# Patient Record
Sex: MALE | Race: BLACK OR AFRICAN AMERICAN | Employment: FULL TIME | URBAN - METROPOLITAN AREA
[De-identification: names, ages, dates, MRNs, and addresses within clinical notes are randomized per-mention and may not be internally consistent; named-entity substitution may affect disease eponyms.]

---

## 2017-03-13 ENCOUNTER — OFFICE VISIT (OUTPATIENT)
Dept: INTERNAL MEDICINE CLINIC | Age: 62
End: 2017-03-13

## 2017-03-13 VITALS
SYSTOLIC BLOOD PRESSURE: 146 MMHG | OXYGEN SATURATION: 99 % | DIASTOLIC BLOOD PRESSURE: 79 MMHG | WEIGHT: 261 LBS | BODY MASS INDEX: 41.95 KG/M2 | HEIGHT: 66 IN | TEMPERATURE: 97.7 F | HEART RATE: 80 BPM

## 2017-03-13 DIAGNOSIS — E87.6 HYPOKALEMIA: ICD-10-CM

## 2017-03-13 DIAGNOSIS — E55.9 VITAMIN D DEFICIENCY: ICD-10-CM

## 2017-03-13 DIAGNOSIS — E78.01 FAMILIAL HYPERCHOLESTEROLEMIA: ICD-10-CM

## 2017-03-13 DIAGNOSIS — I10 ESSENTIAL HYPERTENSION: ICD-10-CM

## 2017-03-13 DIAGNOSIS — I48.0 PAROXYSMAL ATRIAL FIBRILLATION (HCC): ICD-10-CM

## 2017-03-13 DIAGNOSIS — C61 PROSTATE CANCER (HCC): ICD-10-CM

## 2017-03-13 DIAGNOSIS — J45.22 MILD INTERMITTENT ASTHMA WITH STATUS ASTHMATICUS: ICD-10-CM

## 2017-03-13 DIAGNOSIS — Z11.59 NEED FOR HEPATITIS C SCREENING TEST: ICD-10-CM

## 2017-03-13 DIAGNOSIS — R10.31 RLQ ABDOMINAL PAIN: ICD-10-CM

## 2017-03-13 DIAGNOSIS — G47.33 SLEEP APNEA, OBSTRUCTIVE: ICD-10-CM

## 2017-03-13 DIAGNOSIS — E66.01 MORBID OBESITY DUE TO EXCESS CALORIES (HCC): ICD-10-CM

## 2017-03-13 DIAGNOSIS — Z23 ENCOUNTER FOR IMMUNIZATION: ICD-10-CM

## 2017-03-13 DIAGNOSIS — M79.10 MYALGIA: ICD-10-CM

## 2017-03-13 RX ORDER — METFORMIN HYDROCHLORIDE 500 MG/1
500 TABLET, EXTENDED RELEASE ORAL
Qty: 30 TAB | Refills: 4 | Status: SHIPPED | OUTPATIENT
Start: 2017-03-13 | End: 2017-06-12

## 2017-03-13 NOTE — PATIENT INSTRUCTIONS
Trial probiotic over the counter    DECREASE GLYBURIDE TO HALF TABLET EACH MORNING    START METFORMIN 1 TABLET PER DAY (WAIT FOR 3 DAYS AFTER CT SCAN)    AFTER 1 WEEK INCREASE METFORMIN TO 2 TABLETS AND STOP GLYBURIDE    SEE DR BRII LANDRY, AND DR Colon Mass AND EYE EXAM    LABS TODAY

## 2017-03-13 NOTE — PROGRESS NOTES
HISTORY OF PRESENT ILLNESS  Claire Brown is a 64 y.o. male. HPI   Pt is here to establish care.   Pt previously followed at the [facility]     BP today is mildly elevated today at 146/79  BP at home running around 135-152/70s  Continues aldactone 25mg and norvasc 10mg daily   Pt took his medications within the hour however, will repeat BP    Pt with afib and takes multaq  He also takes ASA for anticoagulation  Denies ever taking coumadin in the past   Discussed this is cardio- specific medication and I cannot prescribe this   Denies being told he has heart failure in the past     Pt previously followed with Dr. Abbe Earl (cardio)-he retired  He is not yet seeing another cardiologist but thinks he will be seeing Dr. Ventura Cerda   It has been about 1-2 years since he saw Dr. Eneida Davis him to schedule appointment with cardio  He notes of flipped T wave abnormality     Pt is diabetic and checks his BS reguarly  BS at home running around 111, 112 in the AM fasting and around 120s, 130s after eating  Pt has some low BS at home, around 55, he states this happens about once daily   He gets a low sugar usually when he is going to work during the day   He takes glyburide 5mg daily for this and reports compliance  Pt previously took this twice daily but d/t improvement takes BID  Pt denies ever taking any other medication for this, has never tried metformin   Discussed diabetic medications and would like to consider changing glyburide down the road and coming off of this, and would like to start metformin   Will have him decrease his glyburide to half tablet daily and start metformin 500mg daily   Discussed with him that BS in the 50s is much too low     Reviewed last labs 9/16  Of note, vit D has been extremely low in the past   Will repeat labs today     Continues zocor 10mg daily for cholesterol     Pt follows with Dr. Je Schuster (uro) for h/o prostate cancer  Pt completed radiation for treatment of his prostate cancer   He was dx'd around  and did treatment that year for this   He has not seen urology anytime recently   Last visit with him pt reports having normal PSA   I can check his PSA on labs     Compliant with cpap nightly   He follows Dr. Meche Crews (sleep) for this     Pt c/o RLQ pain x a few days  He has some tenderness to this   Pain is exacerbated by moving around and laying down   He states pain is worse when trying to get comfortable in certain positions  Denies association with eating or BM  Denies nausea, vomiting, constipation, or diarrhea  He does take daily stool softeners but has been stable, regular BM  Pt has not taken anything OTC for this yet   Discussed probiotic to improve this as well   Ordering imaging to evaluate this further and will r/o appendicitis     Wt is 261 lbs- morbidly obese range  Discussed diet and weight loss     Continues klor-con 20meq daily     Pt follows with Dr. Ana Moreno (ortho) for chronic knee pain   He has some R knee pain and is following this physician for this  He may be getting R knee replacement down the road   Will get notes     Reviewed COLO report per [address]: hemorrhoids, 2 flat polyps R colon, tubular adenoma, repeat 5 years  Pt had been having constipation and blood in stool prior to that     Pt with asthma and takes pulmicort for this, takes this BID   Breathing stable and controlled   Previously took Qvar for this    Continues tramadol prn for muscle aching and pain   He takes this infrequently and uses this about once weekly or less   This works well, discussed okay for prn/rare use     Reviewed chest XR  normal         PMHx:  Asthma   Diabetes   HTN  afib   ROBERT   Myalgias   Hypercholesterolemia   Prostate cancer   Vitamin D deficiency       FMHx:  Father- , heart disease, HTN  Mother-  from cirrhosis of liver, HTN, heart disease  Sister- 2-3 strokes  Nephew- stroke   fmhx strong for heart attacks/heart disease and strokes    PSHx:  Knee surgery- BL knees, arthoscopics  Cardiac catheterization    SHx:  Never smoker  No alcohol     Three kids, one step child   Works at [employer]  Colonoscopy: 1/12/16, Dr. Mara Dumont, repeat 5 years  PSA: h/o prostate cancer, [facility] follows   AAA screen:   Tdap: given today, 3/13/2017  Pneumovax: 2/06/2006, updated 3/13/2017  [id number]: not yet needed   Zostavax: will give script down the road   Flu shot: 10/10/2016  Foot exam: 3/13/2017  Microalbumin: 6/16 minimal  A1c: 2011 6.9, ordered   Eye exam: Dr. Milan Loomis, spring 2016, due now, will schedule  Lipids: 2011 LDL 36, 3/17 ordered      Patient Active Problem List    Diagnosis Date Noted    Hypertension 03/05/2011    [facility], [facility] 03/05/2011    Obesity 03/05/2011    Sleep apnea, obstructive 03/05/2011    Atrial fibrillation ([facility] 03/04/2011     Current Outpatient Prescriptions   Medication Sig Dispense Refill    spironolactone (ALDACTONE) 25 mg tablet Take 25 mg by mouth daily.  pramoxine-hydrocortisone (PROTOFOAM-HC) 2.5-1 % topical cream Take as directed up to twice daily 10 g 2    albuterol (PROVENTIL HFA) 90 mcg/actuation inhaler Take  by inhalation.  docusate sodium (STOOL SOFTENER) 100 mg capsule Take 200 mg by mouth daily.  dronedarone (MULTAQ) Tab tablet Take 1 Tab by mouth two (2) times daily (with meals). 60 Tab 12    glyBURIDE (DIABETA) 5 mg tablet Take 1 Tab by mouth two (2) times daily (with meals). 60 Tab 12    amlodipine (NORVASC) 10 mg tablet Take 10 mg by mouth daily. 1/2 tab daily      simvastatin (ZOCOR) 20 mg tablet Take 10 mg by mouth daily. 1/2 tab daily      tramadol (ULTRAM) 50 mg tablet Take 50 mg by mouth every six (6) hours as needed.  aspirin 81 mg tablet Take 81 mg by mouth daily.  pyridoxine (VITAMIN B-6) 100 mg tablet Take 100 mg by mouth daily.  potassium chloride (KLOR-CON M20) 20 mEq tablet Take 20 mEq by mouth two (2) times a day.       beclomethasone (QVAR) 80 mcg/actuation inhaler Take 1 Puff by inhalation daily.  doxazosin (CARDURA) 8 mg tablet Take 8 mg by mouth daily. Past Surgical History:   Procedure Laterality Date    CARDIAC CATHETERIZATION      HX ORTHOPAEDIC      right and left knee surgery    HX OTHER SURGICAL      prostate procedure    IN ANESTH,KNEE AREA SURGERY        Lab Results  Component Value Date/Time   WBC 7.1 09/17/2016 11:54 AM   HGB 13.6 09/17/2016 11:54 AM   HCT 40.1 09/17/2016 11:54 AM   PLATELET 865 20/28/6832 11:54 AM   MCV 80.7 09/17/2016 11:54 AM       Lab Results  Component Value Date/Time   Cholesterol, total 91 04/27/2011 11:00 AM   HDL Cholesterol 50 04/27/2011 11:00 AM   LDL, calculated 36 04/27/2011 11:00 AM   Triglyceride 25 04/27/2011 11:00 AM   CHOL/HDL Ratio 1.8 04/27/2011 11:00 AM       Lab Results  Component Value Date/Time   GFR est AA >60 09/17/2016 11:54 AM   GFR est non-AA 59 09/17/2016 11:54 AM   Creatinine (POC) 0.9 01/04/2013 11:52 AM   Creatinine 1.25 09/17/2016 11:54 AM   BUN 14 09/17/2016 11:54 AM   Sodium 141 09/17/2016 11:54 AM   Potassium 3.8 09/17/2016 11:54 AM   Chloride 109 09/17/2016 11:54 AM   CO2 24 09/17/2016 11:54 AM         Review of Systems   Constitutional: Negative for chills and fever. HENT: Positive for tinnitus (occasional, mild). Negative for hearing loss. Eyes: Negative for blurred vision and double vision. Respiratory: Negative for shortness of breath and wheezing. Cardiovascular: Negative for chest pain and palpitations. Gastrointestinal: Negative for nausea and vomiting. Genitourinary: Negative for dysuria and frequency. Musculoskeletal: Negative for back pain and falls (some issues with balance). Skin: Negative for itching and rash. Neurological: Positive for headaches (occasional, mild). Negative for dizziness and loss of consciousness. Psychiatric/Behavioral: Negative for depression. The patient is not nervous/anxious.         Physical Exam   Constitutional: He is oriented to person, place, and time. He appears well-developed and well-nourished. No distress. HENT:   Head: Normocephalic and atraumatic. Right Ear: External ear normal.   Left Ear: External ear normal.   Mouth/Throat: Oropharynx is clear and moist. No oropharyngeal exudate. Eyes: Conjunctivae and EOM are normal. Pupils are equal, round, and reactive to light. Right eye exhibits no discharge. Left eye exhibits no discharge. No scleral icterus. Neck: Normal range of motion. Neck supple. No carotid bruits     Cardiovascular: Normal rate, regular rhythm, normal heart sounds and intact distal pulses. Exam reveals no gallop and no friction rub. No murmur heard. Pulmonary/Chest: Effort normal and breath sounds normal. No respiratory distress. He has no wheezes. He has no rales. He exhibits no tenderness. Abdominal: Soft. He exhibits no distension and no mass. There is tenderness (RLQ). There is no rebound and no guarding. Musculoskeletal: Normal range of motion. He exhibits edema (trace BLE) and tenderness (RLQ). He exhibits no deformity. Lymphadenopathy:     He has no cervical adenopathy. Neurological: He is alert and oriented to person, place, and time. He has normal reflexes. Coordination normal.   Monofilament nl BLE, good peripheral pulses, no ulcers     Skin: Skin is warm and dry. No rash noted. He is not diaphoretic. No erythema. No pallor. Psychiatric: He has a normal mood and affect. His behavior is normal.       ASSESSMENT and PLAN    ICD-10-CM ICD-9-CM    1. Uncontrolled type 2 diabetes mellitus without complication, without long-term current use of insulin (HCC)    Pt is on glyburide, 5mg, taking once daily in the morning and reports he gets low sugars on daily basis, generally in 50s-60s. He has never tried metformin before and his kidney function when checked in September was adequate to take this medication.      Will start metformin XR 500mg once daily, will have him decrease glyburide to half tablet per day, after one week, stop glyburide and increase metformin to two tablets per day, check W8P and metabolic panel today, did discuss with him he will hold off on starting metformin until after CT abd is complete, can start 3 days after CT complete. Eye exam coming up due and he will schedule. E11.65 250.02  DIABETES FOOT EXAM       DIABETES EYE EXAM      REFERRAL TO OPHTHALMOLOGY      MICROALBUMIN, UR, RAND W/ MICROALBUMIN/CREA RATIO      HEMOGLOBIN A1C WITH EAG      METABOLIC PANEL, COMPREHENSIVE      LIPID PANEL      TSH 3RD GENERATION      PROSTATE SPECIFIC AG (PSA)      CANCELED: CBC W/O DIFF   2. Paroxysmal atrial fibrillation ([address]    Follows with Dr. Ritika Dorsey, who has retired, will need to see either Veronica Olivia or Teddy Acharya, on [address] for this, also on ASA 81mg daily. Will get Ritika Dorsey notes for review. I48.0 427.31 REFERRAL TO CARDIOLOGY       DIABETES FOOT EXAM       DIABETES EYE EXAM      REFERRAL TO OPHTHALMOLOGY      MICROALBUMIN, UR, RAND W/ MICROALBUMIN/CREA RATIO      HEMOGLOBIN A1C WITH EAG      METABOLIC PANEL, COMPREHENSIVE      LIPID PANEL      TSH 3RD GENERATION      PROSTATE SPECIFIC AG (PSA)      CANCELED: CBC W/O DIFF   3. Sleep apnea, obstructive    Compliant with cpap G47.33 327.23  DIABETES FOOT EXAM      HM DIABETES EYE EXAM      REFERRAL TO OPHTHALMOLOGY      MICROALBUMIN, UR, RAND W/ MICROALBUMIN/CREA RATIO      HEMOGLOBIN A1C WITH EAG      METABOLIC PANEL, COMPREHENSIVE      LIPID PANEL      TSH 3RD GENERATION      PROSTATE SPECIFIC AG (PSA)      CANCELED: CBC W/O DIFF   4.  Essential hypertension    Well controlled on aldactone 25mg and norvasc 10mg, he is not on ACEi or ARB, unclear why at this time, will get cardiology notes first, may need to adjust medications at f/u  I10 401.9  DIABETES FOOT EXAM       DIABETES EYE EXAM      REFERRAL TO OPHTHALMOLOGY      MICROALBUMIN, UR, RAND W/ MICROALBUMIN/CREA RATIO      HEMOGLOBIN A1C WITH EAG      METABOLIC PANEL, COMPREHENSIVE      LIPID PANEL      TSH 3RD GENERATION      PROSTATE SPECIFIC AG (PSA)      CANCELED: CBC W/O DIFF   5. Morbid obesity due to excess calories ([address]    Work on diet and weight loss and will be stopping glyburide and starting metformin which should help with weight loss. E66.01 278.01  DIABETES FOOT EXAM       DIABETES EYE EXAM      REFERRAL TO OPHTHALMOLOGY      MICROALBUMIN, UR, RAND W/ MICROALBUMIN/CREA RATIO      HEMOGLOBIN A1C WITH EAG      METABOLIC PANEL, COMPREHENSIVE      LIPID PANEL      TSH 3RD GENERATION      PROSTATE SPECIFIC AG (PSA)      CANCELED: CBC W/O DIFF   6. Mild intermittent asthma with status asthmaticus    On pulmicort BID, works well  J45.22 493.91  DIABETES FOOT EXAM       DIABETES EYE EXAM      REFERRAL TO OPHTHALMOLOGY      MICROALBUMIN, UR, RAND W/ MICROALBUMIN/CREA RATIO      HEMOGLOBIN A1C WITH EAG      METABOLIC PANEL, COMPREHENSIVE      LIPID PANEL      TSH 3RD GENERATION      PROSTATE SPECIFIC AG (PSA)      CANCELED: CBC W/O DIFF   7. Familial hypercholesterolemia    On zocor 10mg daily E78.01 272.0  DIABETES FOOT EXAM       DIABETES EYE EXAM      REFERRAL TO OPHTHALMOLOGY      MICROALBUMIN, UR, RAND W/ MICROALBUMIN/CREA RATIO      HEMOGLOBIN A1C WITH EAG      METABOLIC PANEL, COMPREHENSIVE      LIPID PANEL      TSH 3RD GENERATION      PROSTATE SPECIFIC AG (PSA)      CANCELED: CBC W/O DIFF   8. Hypokalemia    On klor-con 20meq, two per day, check BMP and adjust dosing as needed. E87.6 276.8  DIABETES FOOT EXAM       DIABETES EYE EXAM      REFERRAL TO OPHTHALMOLOGY      MICROALBUMIN, UR, RAND W/ MICROALBUMIN/CREA RATIO      HEMOGLOBIN A1C WITH EAG      METABOLIC PANEL, COMPREHENSIVE      LIPID PANEL      TSH 3RD GENERATION      PROSTATE SPECIFIC AG (PSA)      CANCELED: CBC W/O DIFF   9. Myalgia    Uses tramadol infrequently, up to once per week, often not even on weekly basis, okay to prescribe for infrequent use. M79.1 729.1  DIABETES FOOT EXAM       DIABETES EYE EXAM      REFERRAL TO OPHTHALMOLOGY      MICROALBUMIN, UR, RAND W/ MICROALBUMIN/CREA RATIO      HEMOGLOBIN A1C WITH EAG      METABOLIC PANEL, COMPREHENSIVE      LIPID PANEL      TSH 3RD GENERATION      PROSTATE SPECIFIC AG (PSA)      CANCELED: CBC W/O DIFF   10. Prostate cancer ([address]    H/o, sees Dr. Kimberly Galloway for this, overdue, will check PSA, h/o XRT for therapy, advised him to schedule f/u  [id number]  DIABETES FOOT EXAM      HM DIABETES EYE EXAM      REFERRAL TO OPHTHALMOLOGY      MICROALBUMIN, UR, RAND W/ MICROALBUMIN/CREA RATIO      HEMOGLOBIN A1C WITH EAG      METABOLIC PANEL, COMPREHENSIVE      LIPID PANEL      TSH 3RD GENERATION      PROSTATE SPECIFIC AG (PSA)      REFERRAL TO UROLOGY      CANCELED: CBC W/O DIFF   11. Vitamin D deficiency    Check level and treat as needed, was exceedingly low, less than 9 last check    E55.9 268.9 VITAMIN D, 25 HYDROXY   12. RLQ abdominal pain    R/o appendicitis, check CT abd R10.31 789.03 CT ABD W CONT        Depression screen reviewed and negative    Written by Babita Macdonald, as dictated by Aster Fish MD.    Current diagnosis and concerns discussed with pt at length. Understands risks and benefits or current treatment plan and medications and accepts the treatment and medication with any possible risks.   Pt asks appropriate questions which were answered.   Pt instructed to call with any concerns or problems. This note will not be viewable in [address]

## 2017-03-13 NOTE — MR AVS SNAPSHOT
Visit Information Date & Time Provider Department Dept. Phone Encounter #  
 3/13/2017  9:30 AM Raoul Mcneal, [address] [place] [address],[address] [phone] [id number] Follow-up Instructions Return in about 3 months (around 6/13/2017). Upcoming Health Maintenance Date Due Hepatitis C Screening 1955 FOOT EXAM Q1 7/24/1965 EYE EXAM RETINAL OR DILATED Q1 7/24/1965 DTaP/Tdap/Td series (1 - Tdap) 7/24/1976 FOBT Q 1 YEAR AGE 50-75 7/24/2005 HEMOGLOBIN A1C Q6M 10/27/2011 LIPID PANEL Q1 4/27/2012 ZOSTER VACCINE AGE 60> 7/24/2015 INFLUENZA AGE 9 TO ADULT 8/1/2016 MICROALBUMIN Q1 6/7/2017 Allergies as of 3/13/2017  Review Complete On: 3/13/2017 By: Raoul Mcneal MD  
  
 Severity Noted Reaction Type Reactions Sulfa (Sulfonamide Antibiotics)  03/04/2011    Itching Voltaren [Diclofenac Sodium]  03/04/2011    Swelling Current Immunizations  Reviewed on 3/13/2017 Name Date Influenza Vaccine 10/10/2016 Pneumococcal Vaccine (Unspecified Type) 2/6/2006 Reviewed by Raoul Mcneal MD on 3/13/2017 at  9:30 AM  
 Reviewed by Raoul Mcneal MD on 3/13/2017 at  9:31 AM  
You Were Diagnosed With   
  
 Codes Comments Uncontrolled type 2 diabetes mellitus without complication, without long-term current use of insulin ([facility]    -  Primary ICD-10-CM: E11.65 ICD-9-CM: 250.02 Paroxysmal atrial fibrillation (HCC)     ICD-10-CM: I48.0 ICD-9-CM: 427.31 Sleep apnea, obstructive     ICD-10-CM: G47.33 
ICD-9-CM: 327.23 Essential hypertension     ICD-10-CM: I10 
ICD-9-CM: 401.9 Morbid obesity due to excess calories (HCC)     ICD-10-CM: E66.01 
ICD-9-CM: 278.01 Mild intermittent asthma with status asthmaticus     ICD-10-CM: J45.22 
ICD-9-CM: 493.91 Familial hypercholesterolemia     ICD-10-CM: E78.01 
ICD-9-CM: 272.0 Hypokalemia     ICD-10-CM: E87.6 ICD-9-CM: 276.8 Myalgia     ICD-10-CM: M79.1 ICD-9-CM: 729.1 Prostate cancer [facility])     ICD-10-CM: A85 ICD-9-CM: 493 Vitamin D deficiency     ICD-10-CM: E55.9 ICD-9-CM: 268.9 RLQ abdominal pain     ICD-10-CM: R10.31 ICD-9-CM: 789.03 Vitals BP Pulse Temp Height(growth percentile) Weight(growth percentile) SpO2  
 146/79 (BP 1 Location: Right arm, BP Patient Position: Sitting) 80 97.7 °F (36.5 °C) (Oral) 5' 6\" (1.676 m) 261 lb (118.4 kg) 99% BMI Smoking Status 42.13 kg/m2 Never Smoker BMI and BSA Data Body Mass Index Body Surface Area  
 42.13 kg/m 2 2.35 m 2 Preferred Pharmacy Pharmacy Name [facility] [address] [place] [address] [phone] Your Updated Medication List  
  
   
This list is accurate as of: 3/13/17  9:56 AM.  Always use your most recent med list. amLODIPine 10 mg tablet Commonly known as:  Wandra Verito Take 10 mg by mouth daily. 1/2 tab daily  
  
 aspirin 81 mg tablet Take 81 mg by mouth daily. dronedarone Tab tablet Commonly known as:  Melody Dustin Take 1 Tab by mouth two (2) times daily (with meals). glyBURIDE 5 mg tablet Commonly known as:  Augusto South Take 1 Tab by mouth two (2) times daily (with meals). KLOR-CON M20 20 mEq tablet Generic drug:  potassium chloride Take 20 mEq by mouth two (2) times a day. metFORMIN  mg tablet Commonly known as:  GLUCOPHAGE XR Take 1 Tab by mouth daily (with dinner). pramoxine-hydrocortisone 2.5-1 % topical cream  
Commonly known as:  PROTOFOAM-HC Take as directed up to twice daily PROVENTIL HFA 90 mcg/actuation inhaler Generic drug:  albuterol Take  by inhalation. PULMICORT FLEXHALER 180 mcg/actuation Aepb inhaler Generic drug:  budesonide Take  by inhalation. simvastatin 20 mg tablet Commonly known as:  ZOCOR Take 10 mg by mouth daily. 1/2 tab daily  
  
 spironolactone 25 mg tablet Commonly known as:  ALDACTONE  
 Take 25 mg by mouth daily. STOOL SOFTENER 100 mg capsule Generic drug:  docusate sodium Take 200 mg by mouth daily. traMADol 50 mg tablet Commonly known as:  ULTRAM  
Take 50 mg by mouth every six (6) hours as needed. VITAMIN B-6 100 mg tablet Generic drug:  pyridoxine (vitamin B6) Take 100 mg by mouth daily. Prescriptions Sent to Pharmacy Refills  
 metFORMIN ER (GLUCOPHAGE XR) 500 mg tablet 4 Sig: Take 1 Tab by mouth daily (with dinner). Class: Normal  
 Pharmacy: [address] #: [phone] Route: Oral  
  
We Performed the Following HEMOGLOBIN A1C WITH EAG [41974 CPT(R)]  DIABETES EYE EXAM [HM6 Custom]  DIABETES FOOT EXAM [HM7 Custom] LIPID PANEL [30160 CPT(R)] METABOLIC PANEL, COMPREHENSIVE [94819 CPT(R)] MICROALBUMIN, UR, RAND W/ MICROALBUMIN/CREA RATIO [id number] CPT(R)] PROSTATE SPECIFIC AG (PSA) [id number] CPT(R)] REFERRAL TO CARDIOLOGY [FZC61 Custom] Comments:  
 Please evaluate patient for h/o afib on multaq REFERRAL TO OPHTHALMOLOGY [REF57 Custom] Comments:  
 Please evaluate patient for  Dm eye REFERRAL TO UROLOGY [ZZS129 Custom] Comments:  
 Please evaluate patient for prostate cancer TSH 3RD GENERATION [96500 CPT(R)] VITAMIN D, 25 HYDROXY [id number] CPT(R)] Follow-up Instructions Return in about 3 months (around 6/13/2017). To-Do List   
 03/13/2017 Imaging:  CT ABD W CONT Referral Information Referral ID Referred By Referred To  
  
 [id number] [facility] [address] [place] [address] [place], [address] [place] Visits Status Start Date End Date 1 New Request 3/13/17 3/13/18 If your referral has a status of pending review or denied, additional information will be sent to support the outcome of this decision. Referral ID Referred By Referred To [id number] [facility]  
   [address] [facility], [address] Phone: [phone] Fax: [phone] Visits Status Start Date End Date 1 New Request 3/13/17 3/13/18 If your referral has a status of pending review or denied, additional information will be sent to support the outcome of this decision. Referral ID Referred By Referred To  
 [id number] [facility] [address]  
   [facility], [address] Visits Status Start Date End Date 1 New Request 3/13/17 3/13/18 If your referral has a status of pending review or denied, additional information will be sent to support the outcome of this decision. Patient Instructions Trial probiotic over the counter DECREASE GLYBURIDE TO HALF TABLET EACH MORNING 
 
START METFORMIN 1 TABLET PER DAY (WAIT FOR 3 DAYS AFTER CT SCAN) AFTER 1 WEEK INCREASE METFORMIN TO 2 TABLETS AND STOP GLYBURIDE 
 
SEE DR BRII LANDRY, AND DR Belinda Cruz AND EYE EXAM 
 
LABS TODAY Introducing \[facility] [place] [facility]\"" & HEALTH SERVICES! Frank Arenas introduces [employer] patient portal. Now you can access parts of your medical record, email your doctor's office, and request medication refills online. 1. In your internet browser, go to https://[employer]. [employer] 2. Click on the First Time User? Click Here link in the Sign In box. You will see the New Member Sign Up page. 3. Enter your [employer] Access Code exactly as it appears below. You will not need to use this code after youve completed the sign-up process. If you do not sign up before the expiration date, you must request a new code. · [employer] Access Code: [email or web address] Expires: 6/11/2017  9:56 AM 
 
4. Enter the last four digits of your Social Security Number (xxxx) and Date of Birth (mm/dd/yyyy) as indicated and click Submit. You will be taken to the next sign-up page. 5. Create a [employer] ID. This will be your [employer] login ID and cannot be changed, so think of one that is secure and easy to remember. 6. Create a [employer] password. You can change your password at any time. 7. Enter your Password Reset Question and Answer. This can be used at a later time if you forget your password. 8. Enter your e-mail address. You will receive e-mail notification when new information is available in [address] 9. Click Sign Up. You can now view and download portions of your medical record. 10. Click the Download Summary menu link to download a portable copy of your medical information. If you have questions, please visit the Frequently Asked Questions section of the [employer] website. Remember, [employer] is NOT to be used for urgent needs. For medical emergencies, dial 911. Now available from your iPhone and Android! Please provide this summary of care documentation to your next provider. Your primary care clinician is listed as Emi Perez. If you have any questions after today's visit, please call [phone].

## 2017-03-14 LAB
25(OH)D3+25(OH)D2 SERPL-MCNC: 15.5 NG/ML (ref 30–100)
ALBUMIN SERPL-MCNC: 3.9 G/DL (ref 3.6–4.8)
ALBUMIN/CREAT UR: 32.8 MG/G CREAT (ref 0–30)
ALBUMIN/GLOB SERPL: 1.3 {RATIO} (ref 1.2–2.2)
ALP SERPL-CCNC: 81 IU/L (ref 39–117)
ALT SERPL-CCNC: 26 IU/L (ref 0–44)
AST SERPL-CCNC: 19 IU/L (ref 0–40)
BILIRUB SERPL-MCNC: 0.8 MG/DL (ref 0–1.2)
BUN SERPL-MCNC: 20 MG/DL (ref 8–27)
BUN/CREAT SERPL: 17 (ref 10–22)
CALCIUM SERPL-MCNC: 9.2 MG/DL (ref 8.6–10.2)
CHLORIDE SERPL-SCNC: 102 MMOL/L (ref 96–106)
CHOLEST SERPL-MCNC: 115 MG/DL (ref 100–199)
CO2 SERPL-SCNC: 22 MMOL/L (ref 18–29)
CREAT SERPL-MCNC: 1.17 MG/DL (ref 0.76–1.27)
CREAT UR-MCNC: 114.2 MG/DL
EST. AVERAGE GLUCOSE BLD GHB EST-MCNC: 163 MG/DL
GLOBULIN SER CALC-MCNC: 3.1 G/DL (ref 1.5–4.5)
GLUCOSE SERPL-MCNC: 216 MG/DL (ref 65–99)
HBA1C MFR BLD: 7.3 % (ref 4.8–5.6)
HDLC SERPL-MCNC: 60 MG/DL
LDLC SERPL CALC-MCNC: 44 MG/DL (ref 0–99)
MICROALBUMIN UR-MCNC: 37.5 UG/ML
POTASSIUM SERPL-SCNC: 4.3 MMOL/L (ref 3.5–5.2)
PROT SERPL-MCNC: 7 G/DL (ref 6–8.5)
PSA SERPL-MCNC: 0.4 NG/ML (ref 0–4)
SODIUM SERPL-SCNC: 138 MMOL/L (ref 134–144)
TRIGL SERPL-MCNC: 57 MG/DL (ref 0–149)
TSH SERPL DL<=0.005 MIU/L-ACNC: 2.76 UIU/ML (ref 0.45–4.5)
VLDLC SERPL CALC-MCNC: 11 MG/DL (ref 5–40)

## 2017-03-14 NOTE — PROGRESS NOTES
vit D is low--the patient needs 50,000 units weekly for 8 weeks then start a daily 2000unit supplement instead.     Dm not controlled, but just mildly uncontrolled, start metformin as discussed, 1 per day, increase to 2 per day after 1-2 weeks    Continue half tab glyburide for now, monitor glucose, schedule f/u for in 3 months,     Repeat a1c, bmp 1 week prior will adjust meds further at that time,  Call if low sugars less than 70--would then stop glyburide

## 2017-03-16 RX ORDER — ERGOCALCIFEROL 1.25 MG/1
50000 CAPSULE ORAL
Qty: 8 CAP | Refills: 0 | Status: SHIPPED | OUTPATIENT
Start: 2017-03-16 | End: 2017-06-12

## 2017-03-16 NOTE — PROGRESS NOTES
Called, spoke to pt. Two pt identifiers confirmed. Pt informed per Dr. Bassam Florez vit D is low--the patient needs 50,000 units weekly for 8 weeks then start a daily 2000unit supplement instead. Pt agreed. Med ordered. Pt informed per Dr. Kisha Paige not controlled, but just mildly uncontrolled, start metformin as discussed, 1 per day, increase to 2 per day after 1-2 weeks. Pt informed per Dr. Bassam Florez continue half tab glyburide for now, monitor glucose, schedule f/u for in 3 months. Pt informed per Dr. Bassam Florez repeat a1c, bmp 1 week prior will adjust meds further at that time,  Call if low sugars less than 70--would then stop glyburide. Labs ordered and mailed to pt. Pt verbalized understanding of information discussed w/ no further questions at this time.

## 2017-03-23 ENCOUNTER — TELEPHONE (OUTPATIENT)
Dept: INTERNAL MEDICINE CLINIC | Age: 62
End: 2017-03-23

## 2017-03-23 DIAGNOSIS — R10.31 RLQ ABDOMINAL PAIN: Primary | ICD-10-CM

## 2017-03-23 NOTE — TELEPHONE ENCOUNTER
Mayra//[facility] needs a call back in reference to New Order needs to be corrected to be CT of the Abdomen/Pelvis. Nothing else on order. Please call.  Thank you

## 2017-03-23 NOTE — TELEPHONE ENCOUNTER
Spoke to [employer]. [employer] states insurance denying ct abd. [employer] [facility] insurance are wiling to do ct abd/pelvis w/ contrast.  [employer] informed Dr. Annika Kessler will be notified. [employer] verbalized understanding of information discussed w/ no further questions at this time.

## 2017-03-23 NOTE — TELEPHONE ENCOUNTER
Mayra/[facility] state she needs a call back right away in reference to patient order for CT scan. Please call.  Thank you

## 2017-03-24 NOTE — TELEPHONE ENCOUNTER
Spoke to [employer] at [employer].    [employer] informed that correct order was placed for CT abd/pelvis w/ contrast.

## 2017-03-28 ENCOUNTER — HOSPITAL ENCOUNTER (OUTPATIENT)
Dept: CT IMAGING | Age: 62
Discharge: HOME | End: 2017-03-28
Attending: INTERNAL MEDICINE
Payer: COMMERCIAL

## 2017-03-28 DIAGNOSIS — R10.31 RLQ ABDOMINAL PAIN: ICD-10-CM

## 2017-03-28 PROCEDURE — 74011000255 HC RX REV CODE- 255: Performed by: INTERNAL MEDICINE

## 2017-03-28 PROCEDURE — 74011636320 HC RX REV CODE- 636/320: Performed by: INTERNAL MEDICINE

## 2017-03-28 PROCEDURE — 74177 CT ABD & PELVIS W/CONTRAST: CPT

## 2017-03-28 PROCEDURE — 74011250636 HC RX REV CODE- 250/636: Performed by: INTERNAL MEDICINE

## 2017-03-28 RX ORDER — SODIUM CHLORIDE 0.9 % (FLUSH) 0.9 %
10 SYRINGE (ML) INJECTION
Status: COMPLETED | OUTPATIENT
Start: 2017-03-28 | End: 2017-03-28

## 2017-03-28 RX ORDER — SODIUM CHLORIDE 9 MG/ML
50 INJECTION, SOLUTION INTRAVENOUS
Status: COMPLETED | OUTPATIENT
Start: 2017-03-28 | End: 2017-03-28

## 2017-03-28 RX ORDER — BARIUM SULFATE 20 MG/ML
900 SUSPENSION ORAL
Status: COMPLETED | OUTPATIENT
Start: 2017-03-28 | End: 2017-03-28

## 2017-03-28 RX ADMIN — Medication 10 ML: at 15:00

## 2017-03-28 RX ADMIN — IOPAMIDOL 100 ML: 612 INJECTION, SOLUTION INTRAVENOUS at 15:00

## 2017-03-28 RX ADMIN — SODIUM CHLORIDE 50 ML/HR: 900 INJECTION, SOLUTION INTRAVENOUS at 15:00

## 2017-03-28 RX ADMIN — BARIUM SULFATE 900 ML: 21 SUSPENSION ORAL at 15:00

## 2017-05-01 ENCOUNTER — TELEPHONE (OUTPATIENT)
Dept: INTERNAL MEDICINE CLINIC | Age: 62
End: 2017-05-01

## 2017-05-01 NOTE — TELEPHONE ENCOUNTER
[facility] [facility] ED f/u needed for pneumonia along with hand swelling.

## 2017-05-02 NOTE — TELEPHONE ENCOUNTER
Pt's wife An Norwood is returning the nurse call. Best contact [phone].        Message received & copied from [facility] after closing on 5/1/17

## 2017-05-03 NOTE — TELEPHONE ENCOUNTER
Pt's wife Therese Gonsales returned a missed call. The best contact number is [phone].        Message received & copied from [facility] after closing on 5/2/17

## 2017-05-03 NOTE — TELEPHONE ENCOUNTER
Spoke to JULIÁN Chowdary ([employer]). Diane Mendez states that pt had an ED visit. Dx'd w/ pna. Contrerassoo Mendez states she needs a f/u w/ PCP. Diane Patela offered and accepted appt for pt on 5/5/17 2102. Diane Mendez verbalized understanding of information discussed w/ no further questions at this time.

## 2017-05-05 ENCOUNTER — OFFICE VISIT (OUTPATIENT)
Dept: INTERNAL MEDICINE CLINIC | Age: 62
End: 2017-05-05

## 2017-05-05 ENCOUNTER — TELEPHONE (OUTPATIENT)
Dept: INTERNAL MEDICINE CLINIC | Age: 62
End: 2017-05-05

## 2017-05-05 VITALS
OXYGEN SATURATION: 99 % | RESPIRATION RATE: 20 BRPM | SYSTOLIC BLOOD PRESSURE: 149 MMHG | TEMPERATURE: 97.7 F | BODY MASS INDEX: 41.62 KG/M2 | DIASTOLIC BLOOD PRESSURE: 92 MMHG | HEIGHT: 66 IN | WEIGHT: 259 LBS | HEART RATE: 72 BPM

## 2017-05-05 DIAGNOSIS — E87.6 HYPOKALEMIA: ICD-10-CM

## 2017-05-05 DIAGNOSIS — I10 ESSENTIAL HYPERTENSION: ICD-10-CM

## 2017-05-05 DIAGNOSIS — Z11.59 NEED FOR HEPATITIS C SCREENING TEST: ICD-10-CM

## 2017-05-05 DIAGNOSIS — Z87.01 H/O: PNEUMONIA: ICD-10-CM

## 2017-05-05 DIAGNOSIS — M79.89 SWELLING OF BOTH HANDS: ICD-10-CM

## 2017-05-05 DIAGNOSIS — M65.321 TRIGGER INDEX FINGER OF RIGHT HAND: ICD-10-CM

## 2017-05-05 DIAGNOSIS — E78.01 FAMILIAL HYPERCHOLESTEROLEMIA: ICD-10-CM

## 2017-05-05 DIAGNOSIS — I48.0 PAROXYSMAL ATRIAL FIBRILLATION (HCC): ICD-10-CM

## 2017-05-05 RX ORDER — LOSARTAN POTASSIUM 50 MG/1
50 TABLET ORAL DAILY
Qty: 30 TAB | Refills: 3 | Status: SHIPPED | OUTPATIENT
Start: 2017-05-05 | End: 2017-09-12 | Stop reason: SDUPTHER

## 2017-05-05 RX ORDER — HYDROCODONE POLISTIREX AND CHLORPHENIRAMINE POLISTIREX 10; 8 MG/5ML; MG/5ML
1 SUSPENSION, EXTENDED RELEASE ORAL
Qty: 140 ML | Refills: 0 | Status: SHIPPED | OUTPATIENT
Start: 2017-05-05 | End: 2017-06-12

## 2017-05-05 NOTE — TELEPHONE ENCOUNTER
Pt forgot his letter stating he can or can't return to work. Pt would like to pick this up today at the . Please call pt to let him know.

## 2017-05-05 NOTE — MR AVS SNAPSHOT
Visit Information Date & Time Provider Department Dept. Phone Encounter #  
 5/5/2017  1:30 PM Fang Tolliver, [address] [phone] [id number] Follow-up Instructions Return for as scheduled. Your Appointments 5/5/2017  1:30 PM  
ROUTINE CARE with Fang Tolliver MD  
[facility] [address]) Appt Note: f/u ED; [facility] 5/4/17  
 [address] [facility] [address]  
  
   
 [address] [facility]  [address] [facility]  
  
    
 6/12/2017 11:00 AM  
ROUTINE CARE with Fang Tolliver, [address]) Appt Note: dm  
 [address] [facility]  
[phone] Upcoming Health Maintenance Date Due Hepatitis C Screening 1955 EYE EXAM RETINAL OR DILATED Q1 7/24/1965 ZOSTER VACCINE AGE 60> 7/24/2015 INFLUENZA AGE 9 TO ADULT 8/1/2017 HEMOGLOBIN A1C Q6M 9/13/2017 Pneumococcal 19-64 Highest Risk (3 of 3 - PCV13) 3/13/2018 FOOT EXAM Q1 3/13/2018 MICROALBUMIN Q1 3/13/2018 LIPID PANEL Q1 3/13/2018 COLONOSCOPY 1/12/2026 DTaP/Tdap/Td series (2 - Td) 3/13/2027 Allergies as of 5/5/2017  Review Complete On: 5/5/2017 By: Fang Tolliver MD  
  
 Severity Noted Reaction Type Reactions Sulfa (Sulfonamide Antibiotics)  03/04/2011    Itching Voltaren [Diclofenac Sodium]  03/04/2011    Swelling Current Immunizations  Reviewed on 3/13/2017 Name Date Influenza Vaccine 10/10/2016 Pneumococcal Polysaccharide (PPSV-23) 3/13/2017 Pneumococcal Vaccine (Unspecified Type) 2/6/2006 Tdap 3/13/2017 Zoster Vaccine, Live 2/16/2015 Not reviewed this visit You Were Diagnosed With   
  
 Codes Comments Uncontrolled type 2 diabetes mellitus without complication, without long-term current use of insulin ([facility]    -  Primary ICD-10-CM: E11.65 ICD-9-CM: 250.02   
 Essential hypertension     ICD-10-CM: I10 
ICD-9-CM: 401.9 Familial hypercholesterolemia     ICD-10-CM: E78.01 
ICD-9-CM: 272.0 Paroxysmal atrial fibrillation (HCC)     ICD-10-CM: I48.0 ICD-9-CM: 427.31 Hypokalemia     ICD-10-CM: E87.6 ICD-9-CM: 276.8 H/O: pneumonia     ICD-10-CM: Z87.01 
ICD-9-CM: V12.61 Swelling of both hands     ICD-10-CM: M79.89 ICD-9-CM: 729.81 Trigger index finger of right hand     ICD-10-CM: M65.321 ICD-9-CM: 727.03 Vitals BP Pulse Temp Resp Height(growth percentile) Weight(growth percentile) (!) 159/102 (BP 1 Location: Left arm, BP Patient Position: Sitting) 72 97.7 °F (36.5 °C) (Oral) 20 5' 6\" (1.676 m) 259 lb (117.5 kg) SpO2 BMI Smoking Status 99% 41.8 kg/m2 Never Smoker BMI and BSA Data Body Mass Index Body Surface Area  
 41.8 kg/m 2 2.34 m 2 Preferred Pharmacy Pharmacy Name Phone [facility] [address] [place] [address] [phone] Your Updated Medication List  
  
   
This list is accurate as of: 5/5/17  1:24 PM.  Always use your most recent med list. amLODIPine 10 mg tablet Commonly known as:  Mosetta Hark Take 10 mg by mouth daily. 1/2 tab daily  
  
 aspirin 81 mg tablet Take 81 mg by mouth daily. chlorpheniramine-HYDROcodone 10-8 mg/5 mL suspension Commonly known as:  Franny Miguel Take 5 mL by mouth every twelve (12) hours as needed for Cough. Max Daily Amount: 10 mL. dronedarone Tab tablet Commonly known as:  Kristy Haus Take 1 Tab by mouth two (2) times daily (with meals). ergocalciferol 50,000 unit capsule Commonly known as:  ERGOCALCIFEROL Take 1 Cap by mouth every seven (7) days. losartan 50 mg tablet Commonly known as:  COZAAR Take 1 Tab by mouth daily. metFORMIN  mg tablet Commonly known as:  GLUCOPHAGE XR Take 1 Tab by mouth daily (with dinner).   
  
 pramoxine-hydrocortisone 2.5-1 % topical cream  
 Commonly known as:  PROTOFOAM-HC Take as directed up to twice daily PROVENTIL HFA 90 mcg/actuation inhaler Generic drug:  albuterol Take  by inhalation. PULMICORT FLEXHALER 180 mcg/actuation Aepb inhaler Generic drug:  budesonide Take 2 Puffs by inhalation two (2) times a day. simvastatin 20 mg tablet Commonly known as:  ZOCOR Take 10 mg by mouth daily. 1/2 tab daily  
  
 spironolactone 25 mg tablet Commonly known as:  ALDACTONE Take 25 mg by mouth daily. STOOL SOFTENER 100 mg capsule Generic drug:  docusate sodium Take 200 mg by mouth daily. VITAMIN B-6 100 mg tablet Generic drug:  pyridoxine (vitamin B6) Take 100 mg by mouth daily. Prescriptions Printed Refills  
 chlorpheniramine-HYDROcodone (TUSSIONEX) 10-8 mg/5 mL suspension 0 Sig: Take 5 mL by mouth every twelve (12) hours as needed for Cough. Max Daily Amount: 10 mL. Class: Print Route: Oral  
  
Prescriptions Sent to Pharmacy Refills  
 losartan (COZAAR) 50 mg tablet 3 Sig: Take 1 Tab by mouth daily. Class: Normal  
 Pharmacy: [address] [place] [address] #: [phone] Route: Oral  
  
We Performed the Following REFERRAL TO ORTHOPEDIC SURGERY [REF62 Custom] Comments:  
 Please evaluate patient for trigger Follow-up Instructions Return for as scheduled. To-Do List   
 05/29/2017 Imaging:  XR CHEST PA LAT Referral Information Referral ID Referred By Referred To  
  
 [id number] Gloria Correa M.D. [address], [address] [place] [address] Visits Status Start Date End Date 1 New Request 5/5/17 5/5/18 If your referral has a status of pending review or denied, additional information will be sent to support the outcome of this decision. Patient Instructions Repeat labs and xray 1 week prior to follow up Start losartan 50mg daily Introducing [facility] & HEALTH SERVICES! [employer] introduces [employer] patient portal. Now you can access parts of your medical record, email your doctor's office, and request medication refills online. 1. In your internet browser, go to [email or web address] 2. Click on the First Time User? Click Here link in the Sign In box. You will see the New Member Sign Up page. 3. Enter your [employer] Access Code exactly as it appears below. You will not need to use this code after youve completed the sign-up process. If you do not sign up before the expiration date, you must request a new code. · [employer] Access Code: [email or web address] Expires: 6/11/2017  9:56 AM 
 
4. Enter the last four digits of your Social Security Number (xxxx) and Date of Birth (mm/dd/yyyy) as indicated and click Submit. You will be taken to the next sign-up page. 5. Create a [employer] ID. This will be your [employer] login ID and cannot be changed, so think of one that is secure and easy to remember. 6. Create a [employer] password. You can change your password at any time. 7. Enter your Password Reset Question and Answer. This can be used at a later time if you forget your password. 8. Enter your e-mail address. You will receive e-mail notification when new information is available in [address] 9. Click Sign Up. You can now view and download portions of your medical record. 10. Click the Download Summary menu link to download a portable copy of your medical information. If you have questions, please visit the Frequently Asked Questions section of the [employer] website. Remember, [employer] is NOT to be used for urgent needs. For medical emergencies, dial 911. Now available from your iPhone and Android! Please provide this summary of care documentation to your next provider. Your primary care clinician is listed as Darrius Johnson.  If you have any questions after today's visit, please call [phone].

## 2017-05-05 NOTE — TELEPHONE ENCOUNTER
Left vm for pt that he can return 5/8/17 and if he needs a letter for work from 5/5/17, call the office; if not, disregard.

## 2017-05-23 ENCOUNTER — HOSPITAL ENCOUNTER (EMERGENCY)
Age: 62
Discharge: HOME | End: 2017-05-23
Attending: EMERGENCY MEDICINE
Payer: COMMERCIAL

## 2017-05-23 VITALS
HEIGHT: 66 IN | DIASTOLIC BLOOD PRESSURE: 91 MMHG | HEART RATE: 78 BPM | TEMPERATURE: 97.8 F | RESPIRATION RATE: 16 BRPM | OXYGEN SATURATION: 99 % | SYSTOLIC BLOOD PRESSURE: 150 MMHG | WEIGHT: 260.14 LBS | BODY MASS INDEX: 41.81 KG/M2

## 2017-05-23 DIAGNOSIS — L28.2 PRURITIC RASH: Primary | ICD-10-CM

## 2017-05-23 PROCEDURE — 99283 EMERGENCY DEPT VISIT LOW MDM: CPT

## 2017-05-23 PROCEDURE — 74011250637 HC RX REV CODE- 250/637: Performed by: EMERGENCY MEDICINE

## 2017-05-23 RX ORDER — HYDROCORTISONE 0.5 %
OINTMENT (GRAM) TOPICAL 2 TIMES DAILY
Qty: 15 G | Refills: 0 | Status: SHIPPED | OUTPATIENT
Start: 2017-05-23 | End: 2017-06-02

## 2017-05-23 RX ORDER — DIPHENHYDRAMINE HCL 50 MG
50 CAPSULE ORAL
Status: COMPLETED | OUTPATIENT
Start: 2017-05-23 | End: 2017-05-23

## 2017-05-23 RX ORDER — CETIRIZINE HCL 10 MG
10 TABLET ORAL DAILY
Qty: 20 TAB | Refills: 0 | Status: SHIPPED | OUTPATIENT
Start: 2017-05-23 | End: 2017-09-15

## 2017-05-23 RX ADMIN — DIPHENHYDRAMINE HYDROCHLORIDE 50 MG: 50 CAPSULE ORAL at 03:41

## 2017-05-23 NOTE — DISCHARGE INSTRUCTIONS

## 2017-05-23 NOTE — ED NOTES
Patient received discharge instructions and questions were answered by ED MD Joshua Gray. Respirations even and unlabored, no signs or symptoms of cardiac distress noted at this time. Any wants or needs verbalized have been addressed to the best of our ability. Patient ambulated from ED with discharge instructions in hand. Wheelchair offered and declined by pt, educated patient on policy of discharge by wheelchair, verbalized understanding.

## 2017-05-25 ENCOUNTER — TELEPHONE (OUTPATIENT)
Dept: INTERNAL MEDICINE CLINIC | Age: 62
End: 2017-05-25

## 2017-05-25 NOTE — TELEPHONE ENCOUNTER
Spoke to [facility] Jim Ser states that pt has had a reaction to metformin. John All states that pt has had a previous reaction to glucophage but forgot. Kingston All states pt took last night and got blistery-bumps on hands, arms along with itching. Kingston All states that pt will be seeing Derm 6/15/17 1215. Kingston All has appt for pt 6/12/17 w/ PCP. Anusha verbalized understanding of information discussed w/ no further questions at this time.

## 2017-06-09 ENCOUNTER — TELEPHONE (OUTPATIENT)
Dept: INTERNAL MEDICINE CLINIC | Age: 62
End: 2017-06-09

## 2017-06-09 NOTE — TELEPHONE ENCOUNTER
Patient reports BS of 325 in PM on 6/6. Did not eat much that day, but wondered if he ate a high concentration of carbs/sugar or questioned if it was test strips. Ate 2 egg McMuffins for breakfast, [employer] 2 piece meal/vegetables, diet soda. Yesterday & today blood sugars were much lower (average 123-140). Denies polyuria, thirst, blurred vision, HA  Patient feels like his BS were more stabilized on Metformin. Knot on of L side chest behind nipple. Noticed 10 days ago. Size of peanut. Feels firm & softer (varies day by day). No pain, no discharge from nipple, redness or warmth. Recommended he discuss this at appt on 6/12 with Dr. Eleni Jiménez.

## 2017-06-09 NOTE — TELEPHONE ENCOUNTER
PSR returned call to spouse, Tito Guzman (on [employer]), in regards to insurance referral inquiry for pts appt w/ Dr. Maco Holman. PSR informed Mrs. Olsen, that pt referral will be re-faxed to [phone]. Pt verbalized understanding. Mrs. Marco Daniels also wanted to inform the nurse that pts blood sugar has been high lately. She states pts blood sugar was 325 on Wednesday night. Also a week prior pt was complaining of a knot in his chest. Mrs. Isai Spicer can best be reached at either [phone] or [phone] in regards to matter.

## 2017-06-12 ENCOUNTER — OFFICE VISIT (OUTPATIENT)
Dept: INTERNAL MEDICINE CLINIC | Age: 62
End: 2017-06-12

## 2017-06-12 VITALS
OXYGEN SATURATION: 99 % | RESPIRATION RATE: 18 BRPM | TEMPERATURE: 97.4 F | HEART RATE: 71 BPM | HEIGHT: 66 IN | SYSTOLIC BLOOD PRESSURE: 137 MMHG | DIASTOLIC BLOOD PRESSURE: 84 MMHG

## 2017-06-12 DIAGNOSIS — C61 PROSTATE CANCER (HCC): ICD-10-CM

## 2017-06-12 DIAGNOSIS — G47.33 SLEEP APNEA, OBSTRUCTIVE: ICD-10-CM

## 2017-06-12 DIAGNOSIS — N63.0 BREAST MASS IN MALE: ICD-10-CM

## 2017-06-12 DIAGNOSIS — Z11.59 ENCOUNTER FOR HEPATITIS C SCREENING TEST FOR LOW RISK PATIENT: ICD-10-CM

## 2017-06-12 DIAGNOSIS — I10 ESSENTIAL HYPERTENSION: Primary | ICD-10-CM

## 2017-06-12 DIAGNOSIS — I48.0 PAROXYSMAL ATRIAL FIBRILLATION (HCC): ICD-10-CM

## 2017-06-12 DIAGNOSIS — E78.01 FAMILIAL HYPERCHOLESTEROLEMIA: ICD-10-CM

## 2017-06-12 RX ORDER — AMLODIPINE BESYLATE 5 MG/1
5 TABLET ORAL DAILY
COMMUNITY
End: 2017-12-15 | Stop reason: SDUPTHER

## 2017-06-12 RX ORDER — SIMVASTATIN 10 MG/1
TABLET, FILM COATED ORAL
COMMUNITY
End: 2017-11-28 | Stop reason: SDUPTHER

## 2017-06-12 RX ORDER — GLIPIZIDE 2.5 MG/1
2.5 TABLET, EXTENDED RELEASE ORAL DAILY
Qty: 30 TAB | Refills: 3 | Status: SHIPPED | OUTPATIENT
Start: 2017-06-12 | End: 2017-11-15

## 2017-06-12 NOTE — MR AVS SNAPSHOT
Visit Information Date & Time Provider Department Dept. Phone Encounter #  
 6/12/2017 11:00 AM [address] [phone] [id number] Follow-up Instructions Return in about 3 months (around 9/12/2017). Your Appointments 6/12/2017 11:00 AM  
ROUTINE CARE with Roxanne Souza, [address] [facility]) Appt Note: dm  
 [address] [phone]  
[address] [facility] [address] [place] [address] Upcoming Health Maintenance Date Due Hepatitis C Screening 1955 EYE EXAM RETINAL OR DILATED Q1 7/24/1965 INFLUENZA AGE 9 TO ADULT 8/1/2017 HEMOGLOBIN A1C Q6M 9/13/2017 Pneumococcal 19-64 Highest Risk (3 of 3 - PCV13) 3/13/2018 FOOT EXAM Q1 3/13/2018 MICROALBUMIN Q1 3/13/2018 LIPID PANEL Q1 3/13/2018 COLONOSCOPY 1/12/2026 DTaP/Tdap/Td series (2 - Td) 3/13/2027 Allergies as of 6/12/2017  Review Complete On: 6/12/2017 By: Roxanne Souza MD  
  
 Severity Noted Reaction Type Reactions Metformin Medium 05/25/2017   Side Effect Rash Bumps on upper extremities and itching. Sulfa (Sulfonamide Antibiotics)  03/04/2011    Itching Voltaren [Diclofenac Sodium]  03/04/2011    Swelling Current Immunizations  Reviewed on 3/13/2017 Name Date Influenza Vaccine 10/10/2016 Pneumococcal Polysaccharide (PPSV-23) 3/13/2017 Pneumococcal Vaccine (Unspecified Type) 2/6/2006 Tdap 3/13/2017 Zoster Vaccine, Live 2/16/2015 Not reviewed this visit You Were Diagnosed With   
  
 Codes Comments Uncontrolled type 2 diabetes mellitus without complication, without long-term current use of insulin ([facility]    -  Primary ICD-10-CM: E11.65 ICD-9-CM: 250.02 Familial hypercholesterolemia     ICD-10-CM: E78.01 
ICD-9-CM: 272.0 Paroxysmal atrial fibrillation (HCC)     ICD-10-CM: I48.0 ICD-9-CM: 427.31   
 Prostate cancer [facility])     ICD-10-CM: P32 ICD-9-CM: 295 Essential hypertension     ICD-10-CM: I10 
ICD-9-CM: 401.9 Encounter for hepatitis C screening test for low risk patient     ICD-10-CM: Z11.59 
ICD-9-CM: V73.89 Sleep apnea, obstructive     ICD-10-CM: G47.33 
ICD-9-CM: 327.23 Vitals Smoking Status Never Smoker Preferred Pharmacy Pharmacy Name Phone [facility] [address] [place] [address] [phone] Your Updated Medication List  
  
   
This list is accurate as of: 6/12/17 10:56 AM.  Always use your most recent med list.  
  
  
  
  
 aspirin 81 mg tablet Take 81 mg by mouth daily. cetirizine 10 mg tablet Commonly known as:  ZyrTEC Take 1 Tab by mouth daily. dronedarone Tab tablet Commonly known as:  Leitha Halsted Take 1 Tab by mouth two (2) times daily (with meals). glipiZIDE SR 2.5 mg CR tablet Commonly known as:  GLUCOTROL XL Take 1 Tab by mouth daily. losartan 50 mg tablet Commonly known as:  COZAAR Take 1 Tab by mouth daily. NORVASC 5 mg tablet Generic drug:  amLODIPine Take 5 mg by mouth daily. pramoxine-hydrocortisone 2.5-1 % topical cream  
Commonly known as:  PROTOFOAM-HC Take as directed up to twice daily PROVENTIL HFA 90 mcg/actuation inhaler Generic drug:  albuterol Take  by inhalation. PULMICORT FLEXHALER 180 mcg/actuation Aepb inhaler Generic drug:  budesonide Take 2 Puffs by inhalation two (2) times a day. sAXagliptin 5 mg Tab tablet Commonly known as:  ONGLYZA Take 1 Tab by mouth daily. spironolactone 25 mg tablet Commonly known as:  ALDACTONE Take 25 mg by mouth daily. STOOL SOFTENER 100 mg capsule Generic drug:  docusate sodium Take 200 mg by mouth daily. VITAMIN B-6 100 mg tablet Generic drug:  pyridoxine (vitamin B6) Take 100 mg by mouth daily. ZOCOR 10 mg tablet Generic drug:  simvastatin Take  by mouth nightly. Prescriptions Sent to Pharmacy Refills sAXagliptin (ONGLYZA) 5 mg tab tablet 3 Sig: Take 1 Tab by mouth daily. Class: Normal  
 Pharmacy: [address] Ph #: [phone] Route: Oral  
 glipiZIDE SR (GLUCOTROL XL) 2.5 mg CR tablet 3 Sig: Take 1 Tab by mouth daily. Class: Normal  
 Pharmacy: [address] Ph #: [phone] Route: Oral  
  
We Performed the Following HEMOGLOBIN A1C WITH EAG [67048 CPT(R)] HEPATITIS C AB [99940 CPT(R)] METABOLIC PANEL, COMPREHENSIVE [44299 CPT(R)] Follow-up Instructions Return in about 3 months (around 9/12/2017). Patient Instructions Schedule follow up with dr Nettie Blake Start januvia and glipizide 2.5mg daily for diabetes (throw away glyburide) Labs today Start vitamin D 2000units per day Introducing [facility]! Benjamin Mahoney introduces [employer] patient portal. Now you can access parts of your medical record, email your doctor's office, and request medication refills online. 1. In your internet browser, go to [email or web address]. [employer] 2. Click on the First Time User? Click Here link in the Sign In box. You will see the New Member Sign Up page. 3. Enter your [employer] Access Code exactly as it appears below. You will not need to use this code after youve completed the sign-up process. If you do not sign up before the expiration date, you must request a new code. · [employer] Access Code: [email or web address] Expires: 9/10/2017 10:54 AM 
 
4. Enter the last four digits of your Social Security Number (xxxx) and Date of Birth (mm/dd/yyyy) as indicated and click Submit. You will be taken to the next sign-up page. 5. Create a Rivalry ID. This will be your [employer] login ID and cannot be changed, so think of one that is secure and easy to remember. 6. Create a [employer] password. You can change your password at any time. 7. Enter your Password Reset Question and Answer. This can be used at a later time if you forget your password. 8. Enter your e-mail address. You will receive e-mail notification when new information is available in [address] 9. Click Sign Up. You can now view and download portions of your medical record. 10. Click the Download Summary menu link to download a portable copy of your medical information. If you have questions, please visit the Frequently Asked Questions section of the [employer] website. Remember, [employer] is NOT to be used for urgent needs. For medical emergencies, dial 911. Now available from your iPhone and Android! Please provide this summary of care documentation to your next provider. Your primary care clinician is listed as Erickson Mauro. If you have any questions after today's visit, please call [phone].

## 2017-06-12 NOTE — PATIENT INSTRUCTIONS
Schedule follow up with dr Tommy Lewis and glipizide 2.5mg daily for diabetes (throw away glyburide)    Labs today     Start vitamin D 2000units per day

## 2017-06-12 NOTE — PROGRESS NOTES
HISTORY OF PRESENT ILLNESS  Júnior Pollard is a 64 y.o. male. HPI  Last here 5/5/17. Pt is here to f/u on chronic conditions. He presents with pill bottles today - reviewed. BP today is  137/84  BP running 135-140/70s range at home  Continues losartan 50mg, aldactone 25mg and norvasc 5mg daily   He was prescribed 10mg norvasc, but has 5mg pills with him  Was on cardura at one point  Thinks he may have been on valsartan but he does not know why he is not--does not remember SE  No h/o angioedema    Pt follows with cardiology for afib, was seeing dr Maximo Diego daily-stable, saw avinash NP in 4/17, did ekg. Also takes ASA 81mg daily    BS at home running around 212, 132, 75, 215, 162 before eating, highest 326  Checks sugars bid, 130-135 before bed. No lows less than 70  He stopped taking metformin 2 weeks ago because he had hives and swelling in legs  Continues whole tablet glyburide daily    Stopped his klor-con 20meq daily  Check level today     Pt reports a lump over his left chest just superior to his nipple 10 days ago. He denies pain over the area. He was seen in the [facility] in 3/17 for PNA  He was treated with Z-pack. He continues to have a slight cough, but otherwise his sxs have improved. He did not have repeat CXR completed though it was ordered. Wt is stable since last visit  Discussed diet and weight loss --continues to try to loose wt. Rides bikes for exercise. Continues 75 mg Zantac, works well, no breakthrough sxs    I treated pt with 50 K units weekly vit D for 8 weeks   He was supposed to start 2000units daily after, but has not started this    Continues zocor 10mg daily for cholesterol     Continues to use Pulmicort PRN  He denies wheezing.      Pt is followed by Dr. Sofía Payne (sleep)  Compliant with CPAP nightly    Reviewed last labs 3/17  LDL at goal, a1c 7.3, micro checked, vit D low  Repeat labs today    He was scheduled to see Dr. Raghu Beavers for prostate cancer in 5/17, but has not follow up with him. Pt is scheduled to see [facility] tomorrow for evaluation of trigger finger. Eye exam is due--discussed. PREVENTIVE:  Colonoscopy: 1/12/16, Dr. Shante Bernard, repeat 5 years  PSA: 3/17 0.4, h/o prostate cancer, Dr. Morgan Givens follows   AAA screen: not needed; never smoker  Tdap: 3/13/2017  Pneumovax: 3/13/2017  [id number]: not yet needed   Zostavax: will give script down the road   Flu shot: 10/10/2016  Foot exam: 3/13/2017  Microalbumin: 3/17  A1c: 2011 6.9, 3/17 7.3  Eye exam: Dr. Jennifer Joiner, spring 2016, due now, will schedule, reminded today, retinal scan in office today 6/17  Lipids: 3/17 LDL 44      Patient Active Problem List    Diagnosis Date Noted    Familial hypercholesterolemia 03/13/2017    Hypokalemia 03/13/2017    Prostate cancer ([facility] 03/13/2017    Hypertension 03/05/2011    Diabetes mellitus type 2, uncontrolled ([facility] 03/05/2011    Obesity 03/05/2011    Sleep apnea, obstructive 03/05/2011    Atrial fibrillation (HCC) 03/04/2011     Current Outpatient Prescriptions   Medication Sig Dispense Refill    cetirizine (ZYRTEC) 10 mg tablet Take 1 Tab by mouth daily. 20 Tab 0    losartan (COZAAR) 50 mg tablet Take 1 Tab by mouth daily. 30 Tab 3    chlorpheniramine-HYDROcodone (TUSSIONEX) 10-8 mg/5 mL suspension Take 5 mL by mouth every twelve (12) hours as needed for Cough. Max Daily Amount: 10 mL. 140 mL 0    ergocalciferol (ERGOCALCIFEROL) 50,000 unit capsule Take 1 Cap by mouth every seven (7) days. 8 Cap 0    budesonide (PULMICORT FLEXHALER) 180 mcg/actuation aepb inhaler Take 2 Puffs by inhalation two (2) times a day.  metFORMIN ER (GLUCOPHAGE XR) 500 mg tablet Take 1 Tab by mouth daily (with dinner). (Patient taking differently: Take 500 mg by mouth ACB/HS. ) 30 Tab 4    spironolactone (ALDACTONE) 25 mg tablet Take 25 mg by mouth daily.       pramoxine-hydrocortisone (PROTOFOAM-HC) 2.5-1 % topical cream Take as directed up to twice daily 10 g 2    albuterol (PROVENTIL HFA) 90 mcg/actuation inhaler Take  by inhalation.  docusate sodium (STOOL SOFTENER) 100 mg capsule Take 200 mg by mouth daily.  dronedarone (MULTAQ) Tab tablet Take 1 Tab by mouth two (2) times daily (with meals). 60 Tab 12    amlodipine (NORVASC) 10 mg tablet Take 10 mg by mouth daily. 1/2 tab daily      simvastatin (ZOCOR) 20 mg tablet Take 10 mg by mouth daily. 1/2 tab daily      aspirin 81 mg tablet Take 81 mg by mouth daily.  pyridoxine (VITAMIN B-6) 100 mg tablet Take 100 mg by mouth daily. Past Surgical History:   Procedure Laterality Date    CARDIAC CATHETERIZATION      HX ORTHOPAEDIC      right and left knee surgery    HX OTHER SURGICAL      prostate procedure    SC ANESTH,KNEE AREA SURGERY        Lab Results  Component Value Date/Time   WBC 7.1 09/17/2016 11:54 AM   HGB 13.6 09/17/2016 11:54 AM   HCT 40.1 09/17/2016 11:54 AM   PLATELET 077 93/66/6450 11:54 AM   MCV 80.7 09/17/2016 11:54 AM       Lab Results  Component Value Date/Time   Cholesterol, total 115 03/13/2017 10:33 AM   HDL Cholesterol 60 03/13/2017 10:33 AM   LDL, calculated 44 03/13/2017 10:33 AM   Triglyceride 57 03/13/2017 10:33 AM   CHOL/HDL Ratio 1.8 04/27/2011 11:00 AM       Lab Results  Component Value Date/Time   GFR est AA 77 03/13/2017 10:33 AM   GFR est non-AA 67 03/13/2017 10:33 AM   Creatinine (POC) 0.9 01/04/2013 11:52 AM   Creatinine 1.17 03/13/2017 10:33 AM   BUN 20 03/13/2017 10:33 AM   Sodium 138 03/13/2017 10:33 AM   Potassium 4.3 03/13/2017 10:33 AM   Chloride 102 03/13/2017 10:33 AM   CO2 22 03/13/2017 10:33 AM         Review of Systems   Respiratory: Positive for cough. Negative for shortness of breath and wheezing. Cardiovascular: Negative for chest pain. Skin:        Positive for lump over left chest.       Physical Exam   Constitutional: He is oriented to person, place, and time. He appears well-developed and well-nourished. No distress.    HENT:   Head: Normocephalic and atraumatic. Mouth/Throat: Oropharynx is clear and moist. No oropharyngeal exudate. Eyes: Conjunctivae and EOM are normal. Right eye exhibits no discharge. Left eye exhibits no discharge. Neck: Normal range of motion. Neck supple. 1 in diameter epidermal inclusion cyst over back of neck   Cardiovascular: Normal rate, regular rhythm, normal heart sounds and intact distal pulses. Exam reveals no gallop and no friction rub. No murmur heard. Pulmonary/Chest: Effort normal and breath sounds normal. No respiratory distress. He has no wheezes. He has no rales. He exhibits no tenderness. Abdominal: Soft. He exhibits no distension and no mass. There is no tenderness. There is no rebound and no guarding. Musculoskeletal: Normal range of motion. He exhibits edema. He exhibits no tenderness or deformity. Lymphadenopathy:     He has no cervical adenopathy. He has no axillary adenopathy. Neurological: He is alert and oriented to person, place, and time. He has normal reflexes. Coordination normal.   Skin: Skin is warm and dry. No rash noted. He is not diaphoretic. No erythema. No pallor. Golf ball sized circular mass left breast   Psychiatric: He has a normal mood and affect. His behavior is normal.       ASSESSMENT and PLAN    ICD-10-CM ICD-9-CM    1. Uncontrolled type 2 diabetes mellitus without complication, without long-term current use of insulin (HCC)    Pt was on metformin last visit and stopped glyburide. However he decided to stop metformin and start glyburide. Counseled to not change medications on his own. At this time start 100 mg januvia and glipizide xr 2.5 mg daily. Repeat A1c. Check BS at home. Contact office if recurrent low BS.  E11.65 250.02 HEPATITIS C AB      METABOLIC PANEL, COMPREHENSIVE      HEMOGLOBIN A1C WITH EAG   2. Familial hypercholesterolemia    On zocor 10 mg daily.   E78.01 272.0 HEPATITIS C AB      METABOLIC PANEL, COMPREHENSIVE      HEMOGLOBIN A1C WITH EAG   3. Paroxysmal atrial fibrillation (HCC)    On multaq, remains in NSR. Advised to schedule follow up with Dr. Meli Vizcaino. I48.0 427.31 HEPATITIS C AB      METABOLIC PANEL, COMPREHENSIVE      HEMOGLOBIN A1C WITH EAG   4. Prostate cancer [facility])    Follows with Dr. Pawan Arthur. Due to see him. C61 185 HEPATITIS C AB      METABOLIC PANEL, COMPREHENSIVE      HEMOGLOBIN A1C WITH EAG   5. Essential hypertension    Adequately controlled on norvasc 5mg, aldactone 25 mg and losartan 50 mg. Continue, no change to dose. I10 401.9 HEPATITIS C AB      METABOLIC PANEL, COMPREHENSIVE      HEMOGLOBIN A1C WITH EAG   6. Encounter for hepatitis C screening test for low risk patient Z11.59 V73.89 HEPATITIS C AB      METABOLIC PANEL, COMPREHENSIVE      HEMOGLOBIN A1C WITH EAG   7. Sleep apnea, obstructive    Compliant with CPAP. Follows with Dr. Lindsay Conley. G47.33 327.23    8. Breast mass in male    Mammogram and US ordered. Helped him schedule. N63 611.72 GRISELDA MAMMO LT DX INCL CAD      US BREAST LT LIMITED=<3 QUAD        Written by Padmini Mattson, as dictated by Zane Abarca MD.     Current diagnosis and concerns discussed with pt at length. Understands risks and benefits or current treatment plan and medications and accepts the treatment and medication with any possible risks.   Pt asks appropriate questions which were answered.   Pt instructed to call with any concerns or problems. This note will not be viewable in [address]

## 2017-06-13 LAB
ALBUMIN SERPL-MCNC: 4.1 G/DL (ref 3.6–4.8)
ALBUMIN/GLOB SERPL: 1.3 {RATIO} (ref 1.2–2.2)
ALP SERPL-CCNC: 84 IU/L (ref 39–117)
ALT SERPL-CCNC: 30 IU/L (ref 0–44)
AST SERPL-CCNC: 27 IU/L (ref 0–40)
BILIRUB SERPL-MCNC: 0.6 MG/DL (ref 0–1.2)
BUN SERPL-MCNC: 14 MG/DL (ref 8–27)
BUN/CREAT SERPL: 14 (ref 10–24)
CALCIUM SERPL-MCNC: 9.2 MG/DL (ref 8.6–10.2)
CHLORIDE SERPL-SCNC: 101 MMOL/L (ref 96–106)
CO2 SERPL-SCNC: 21 MMOL/L (ref 18–29)
CREAT SERPL-MCNC: 1.02 MG/DL (ref 0.76–1.27)
EST. AVERAGE GLUCOSE BLD GHB EST-MCNC: 163 MG/DL
GLOBULIN SER CALC-MCNC: 3.1 G/DL (ref 1.5–4.5)
GLUCOSE SERPL-MCNC: 211 MG/DL (ref 65–99)
HBA1C MFR BLD: 7.3 % (ref 4.8–5.6)
HCV AB S/CO SERPL IA: 0.1 S/CO RATIO (ref 0–0.9)
POTASSIUM SERPL-SCNC: 4.3 MMOL/L (ref 3.5–5.2)
PROT SERPL-MCNC: 7.2 G/DL (ref 6–8.5)
SODIUM SERPL-SCNC: 139 MMOL/L (ref 134–144)

## 2017-06-16 NOTE — TELEPHONE ENCOUNTER
PSR called pts cell and lvm for pt to call office to schedule appt. PSR called pts home and spoke w/ pt in regards to scheduling. Pt stated that he will call office back to schedule because he didn't have his schedule in front of him.

## 2017-06-20 ENCOUNTER — DOCUMENTATION ONLY (OUTPATIENT)
Dept: INTERNAL MEDICINE CLINIC | Age: 62
End: 2017-06-20

## 2017-06-20 NOTE — PROGRESS NOTES
6/20/17 @10:06 AM Called  attempted PA on Januvia 100 mg and Tradjenta 5 mg. PA rep Maria A Palacio state no PA is needed under plan, both medication cover under plan. Please send script if desired to pharmacy. [facility] DR KATE BAILEY called plan coverage, spoke with Rosario.

## 2017-06-22 ENCOUNTER — TELEPHONE (OUTPATIENT)
Dept: INTERNAL MEDICINE CLINIC | Age: 62
End: 2017-06-22

## 2017-06-22 RX ORDER — CETIRIZINE HCL 10 MG
10 TABLET ORAL DAILY
Qty: 30 TAB | Refills: 3 | Status: CANCELLED | OUTPATIENT
Start: 2017-06-22

## 2017-06-22 NOTE — TELEPHONE ENCOUNTER
Spoke to [facility]. Anitra Foreman states that pt's c/o Itchy eyes-benadryl prn; cough. Anitra Foreman asking for zyrtec be sent to [facility]. Anitra Foreman informed Dr. Blanca Marion will be notified. Anitra Foreman verbalized understanding of information discussed w/ no further questions at this time.

## 2017-06-22 NOTE — PROGRESS NOTES
No diabetic retinopathy on retinal scan however damage d/t htn suspected and will need further eval by ophtho send to ad

## 2017-06-27 ENCOUNTER — HOSPITAL ENCOUNTER (OUTPATIENT)
Dept: MAMMOGRAPHY | Age: 62
Discharge: HOME | End: 2017-06-27
Attending: INTERNAL MEDICINE
Payer: COMMERCIAL

## 2017-06-27 ENCOUNTER — HOSPITAL ENCOUNTER (OUTPATIENT)
Dept: ULTRASOUND IMAGING | Age: 62
Discharge: HOME | End: 2017-06-27
Attending: INTERNAL MEDICINE
Payer: COMMERCIAL

## 2017-06-27 DIAGNOSIS — N63.0 BREAST MASS IN MALE: ICD-10-CM

## 2017-06-27 PROCEDURE — 77066 DX MAMMO INCL CAD BI: CPT

## 2017-06-27 NOTE — PROGRESS NOTES
the patient has gynecomastia on mammogram    Likely cause is aldactone, lets change this to inspra (eplernerone) 25mg daily which is less likely to cause breast tissue enlargement.

## 2017-06-27 NOTE — TELEPHONE ENCOUNTER
Call to pt, ID verified x2. Informed pt zyrtec ok per Dr. Enrique Samuels, he can pick it up at any pharmacy, as OTC. Pt verbalized understanding with no further questions or concerns.

## 2017-06-28 RX ORDER — EPLERENONE 25 MG/1
25 TABLET, FILM COATED ORAL DAILY
Qty: 30 TAB | Refills: 3 | Status: CANCELLED | OUTPATIENT
Start: 2017-06-28

## 2017-06-28 RX ORDER — EPLERENONE 25 MG/1
25 TABLET, FILM COATED ORAL DAILY
Qty: 30 TAB | Refills: 3 | Status: SHIPPED | OUTPATIENT
Start: 2017-06-28 | End: 2017-11-03 | Stop reason: SDUPTHER

## 2017-06-28 NOTE — PROGRESS NOTES
Call to pt, ID verified x2. Results reviewed for retinal exam and mamm. Pt given contact info for Dr. Bear Esters office to make an appt. New Rx order routed to provider for review/approval. Pt verbalized understanding with no further questions or concerns.

## 2017-07-12 ENCOUNTER — DOCUMENTATION ONLY (OUTPATIENT)
Dept: OPHTHALMOLOGY | Age: 62
End: 2017-07-12

## 2017-07-12 NOTE — PROGRESS NOTES
Dear Dr. Alf Montano,  Thank you for your referral of Mr. Lambert Houston for his diabetic eye exam. His vision was 20/20 on exam. His exam was negative for diabetic retinopathy. He did have some A/V nicking from hypertensive retinopathy and angle recession on gonioscopy in his right eye from a prior trauma as well as pigment on his endothelium and corresponding RNFL thinning in the right eye. Please find my notes below for your review. Sincerely,  Clark Sifuentes MD    Outpatient Ophthalmology Note    Date of Service: July 10, 2017    Facility: [facility], [facility], [address]  Phone: [phone]; Fax: [phone]    Ophthalmologist: Clark Sifuentes MD    Subjective    CC: \"Diabetic eye exam/Htn\"    HPI: Whit Selby is a 64 yr old AAM who presents for a diabetic eye exam as well as having a hx of uncontrolled htn as referred by Juan Luis Llanos. Patient advises that his last eye exam was 1 year ago by Dr. Aramis Moscoso. HgA1c: 7.__? Fasting Blood Glucose: 83. Advises that he will intermittently have difficulty focusing his vision on distance. States his last eye exam he was told his pressure in his eyes were high. Primary Wear: Gls- Progressive   States he sleeps with a full face mask CPAP machine at night that at times will cause him to have eye pain at times due to him tightening it too tight in the middle of the night or drying of the eye. Sometimes eye will feel sore, others it will water excessively. Notes seasonal allergies that increases his tear production during pollen season. Periodically has symptoms of itching, watering, light sensitivity, and blurring of vision left eye > Right eye. Does a lot of reading. Does not currently use any OTC eye drops or Rx. At times will have episodes of excessive eye watering in his left eye, swelling in the tear duct area will swell. lasts approximately 2 weeks. will have hard mucous rock come out of left nostril and will provide relief. (tries warm compresses and OTC allergy meds) eye becomes sore over time though from wiping due to tears. Eye Review of Systems:   Flashing Lights - negative  Floaters - negative OS>OD  Eye Pain - negative  History of eye surgery - negative  Loss of vision - negative  Diabetes - NIDDM  Glaucoma - Suspect  Family History - negative     Past Medical Hx:   hypertension  prostate cancer- Radiation Tx 2013   hyperlipdemia  heart attack  A-Fib  asthma  sleep apnea - Wears C-PAP (full face mask)  Osteoarthritis  GERD  Sinus  Deviated Septum (left)    Past Surgical Hx:   arthroscopic sx bilateral knees  Jasper teeth removed (4)     Medications:     cetirizine (ZYRTEC)     losartan (COZAAR)     chlorpheniramine-HYDROcodone (TUSSIONEX)    ergocalciferol (ERGOCALCIFEROL) 50,000 unit capsule     budesonide (PULMICORT FLEXHALER)     metFORMIN ER (GLUCOPHAGE XR) 500 mg tablet      spironolactone (ALDACTONE) 25 mg tablet  Take 25 mg          pramoxine-hydrocortisone (PROTOFOAM-HC) 2.5-1 % topical cream      albuterol (PROVENTIL HFA) 90 mcg/actuation inhaler       docusate sodium (STOOL SOFTENER) 100 mg capsule     dronedarone (MULTAQ)     amlodipine (NORVASC) 1    simvastatin (ZOCOR)     pyridoxine (VITAMIN B-6)     Allergies:   NKDA  Seasonal    Social Hx:   65 y/o AAM  , retired from [employer] as a   No ETOH use  No illicit use  No tobacco     Family Hx:   Siblings- Htn,Stroke    Objective    Visual Acuity:  Distance (best corrected)  OD 20/20-2, Pinhole 20/20  OS 20/20-2, Pinhole 20/20    Wearing Rx:  OD: +1.25 +0.50 x014   Add: +2.25  OS: +1.50 +0.75 x177   Add: +2.25    Manifest Refraction:  OD: +1.25 +0.50 x165       Add: +2.00  OS:  +1.00 +0.75 x020       Add: +2.00    Confrontation Visual Fields  OD: Full to count fingers   OS: Full to count fingers     Motility:   OD - full in all fields of gaze  OS - full in all fields of gaze    Dry eye symptom questionnaire completed. Dry eye signs noted on exam.   Tear osmolarity test ordered:     Tear Osmolarity Results:   OD - 313 mOsm/L  OS -  303 mOsm/L  Intrepretation: Abnormal    Pupils:   OD: 4 mm in dark to 2 mm; no rAPD  OS: 4 mm in dark to 2 mm; no rAPD    IOP (Ta): OD 13 mmHg,                   OS  13 mmHg  at 11:21 AM     Gonioscopy:   OD: open to  deg, 2-3+ pigmentation of TM, angle recession from 9 o'clock to 11 o'clock  OS: open to  deg, 2-3+ pigmentation of TM    Slit Lamp Exam:  External: Normal. No ptosis, no rash, no facial droop  Lids/lashes: crusting OU worse in upper lids. Conjunctiva: Clear OU  Cornea: 2+ pigmentation OD, Trace Pigmentation OS  Anterior Segment: Clear OU  Iris: normal OU  Lens: 1+ NSC OU  Anterior Vitreous: Clear OU. Dilated Fundus Exam:   Dilating Drops (M) instilled at 11:21 AM    Vitreous:   OD: clear  OS: clear    Optic nerve:   OD - c/d 0.3, healthy, pink, no disc edema  OS - c/d 0.3, healthy, pink, no disc edema    Macula:  OD - normal fovea, no drusen, no macular edema, no hemorrhages  OS  - normal fovea, no drusen, no macular edema, no hemorrhages     Vessels:   OD:  A/V nicking   OS:  A/V nicking    Periphery:    OD: No retinal detachment/holes/tears  OS: No retinal detachment/holes/tears    Assessment    1. Hypertensive retinopathy - Grade 1 with A/V nicking    2. Diabetes mellitus (dx 2007) -NIDDM  - no diabetic retinopathy  - no macular edema  - HbA1c = 7.0    3. Epiphora/Watering more in the left eye   - hx of passing hard object and then relief of pain - ?dacrolith  - hx of deviated septum on left side as well. - some dry eye contributing with high tear osmolarity test, mostly normal MG on Lipiscan    4. Cataract - mild both eyes  - not visually significant   - not affecting activities of daily living    5.  Trauma to Right eye in the past with angle recession on gonioscopy and pigment on endothelium   - small area of RNFL thinning superotemporally on OCT, possibly old from prior trauma. Monitor for progression/glaucoma    Diagnoses attached to this encounter:  Type 2 diabetes without complications [QCQ-81: I13.4], [ICD-9: 250.00], [SNOMED: 292254611]  Bilateral hypertensive retinopathy [ICD-10: H35.033], [ICD-9: 362.11], [SNOMED: 5502764]  Recession of chamber angle [ICD-10: H21.551], [ICD-9: 364.77], [SNOMED: 806384154]  Epiphora [ICD-10: H04.202], [ICD-9: 375.20], [SNOMED: 730696905]  Dry eyes (Tear film insufficiency) [ICD-10: H04.123], [ICD-9: 375.15], [SNOMED: 30076966]    Plan    1)  Start omega-3's daily - at least 200 mg of DHA and EPA per day;    - takes approx 2 months to start to notice a difference  - Consider Omega-3 Index (Omega Quant) test to check your blood level of omega-3s    2) Saline Nasal spray    3) Follow up 4 months with VF, disc/fundus photos, IOP check and pachmetry    4) Take BP medication per PCP   Consider visit to dietician     5) New glasses Rx as requested.     Caroline Mott MD

## 2017-08-10 NOTE — PROGRESS NOTES
HISTORY OF PRESENT ILLNESS  Rolo Taylor is a 64 y.o. male. HPI   Last here 3/13/17. Pt is here to f/u on ED visit    [facility] to [facility]  Pt [facility] and was dx'd with hand swelling and PNA  Had been having cough for 3-4 weeks  Was wheezing after cpap over the weekend  Got cxr, was dx with possible pna,   Gave him abx, --zpak  He completed this yesterday   Cough is unchanged,  No chills/fevers. Wife was concerned with right hand, occasionally when he  the steering wheel, gets trigger fingers  On both hands actually, limits ROM at times, not really swelling  Does not take anything for this usually, but will take tylenol at times      BP today is elevated- 159/102, running 140-150 range sbp at home  Continues aldactone 25mg and norvasc 10mg daily   Was on cardura at one point  Thinks he may have been on valsartan but he does not know why he is not--does not remember SE  No h/o angioedema    Pt follows with cardiology for afib, was seeing dr Keenan Aguilar daily-stable, saw avinash NP about 2 weeks ago in April, did ekg. Also takes ASA 81mg daily    BS at home running around 83, 84 fasting, highest 115 fasting. Checks sugars bid, 130-135 before bed. No lows less than 80  Since last visit, I started metformin one per day over the phone, to increase to two tablets daily after two weeks and instructed to continue half tablet glyburide daily  No difficulty with the metformin   He stopped taking glyburide on his own. Stopped his klor-con 20meq daily  Since last visit, unclear why, someone told him maybe he did not need  it    Wt is down 2 lbs since last visit  Discussed diet and weight loss --continues to try to loose wt. Rides bikes for exercise. Pt had c/o abdominal pain   Reviewed CT abd 3/28/17: normal   Pain is resolved.      Since last visit, I treated pt with 50 K units weekly vit D for 8 weeks   Will need to start 2000units daily after    Continues zocor 20mg daily for Patient's daughter (POA) notified and voiced understanding.   cholesterol     Reviewed last labs 3/17  LDL at goal, a1c 7.3, micro checked, vit D low  Repeat labs today    Sees [facility] for prostate cancer, will see him later in may     Eye is due--discussed. PREVENTIVE:  Colonoscopy: 1/12/16, Dr. Carmella Hu, repeat 5 years  PSA: 3/17 0.4, h/o prostate cancer, [facility] follows   AAA screen: not needed; never smoker  Tdap: 3/13/2017  Pneumovax: 3/13/2017  [id number]: not yet needed   Zostavax: will give script down the road   Flu shot: 10/10/2016  Foot exam: 3/13/2017  Microalbumin: 3/17  A1c: 2011 6.9, 3/17 7.3  Eye exam: Dr. Luis Escudero, spring 2016, due now, will schedule  Lipids: 3/17 LDL 44      Patient Active Problem List    Diagnosis Date Noted    Familial hypercholesterolemia 03/13/2017    Hypokalemia 03/13/2017    Prostate cancer ([facility] 03/13/2017    Hypertension 03/05/2011    Diabetes mellitus type 2, uncontrolled ([facility] 03/05/2011    Obesity 03/05/2011    Sleep apnea, obstructive 03/05/2011    [facility] ([facility] 03/04/2011     Current Outpatient Prescriptions   Medication Sig Dispense Refill    ergocalciferol (ERGOCALCIFEROL) 50,000 unit capsule Take 1 Cap by mouth every seven (7) days. 8 Cap 0    budesonide (PULMICORT FLEXHALER) 180 mcg/actuation aepb inhaler Take  by inhalation.  metFORMIN ER (GLUCOPHAGE XR) 500 mg tablet Take 1 Tab by mouth daily (with dinner). 30 Tab 4    spironolactone (ALDACTONE) 25 mg tablet Take 25 mg by mouth daily.  pramoxine-hydrocortisone (PROTOFOAM-HC) 2.5-1 % topical cream Take as directed up to twice daily 10 g 2    albuterol (PROVENTIL HFA) 90 mcg/actuation inhaler Take  by inhalation.  docusate sodium (STOOL SOFTENER) 100 mg capsule Take 200 mg by mouth daily.  dronedarone (MULTAQ) Tab tablet Take 1 Tab by mouth two (2) times daily (with meals). 60 Tab 12    glyBURIDE (DIABETA) 5 mg tablet Take 1 Tab by mouth two (2) times daily (with meals).  60 Tab 12    amlodipine (NORVASC) 10 mg tablet Take 10 mg by mouth daily. 1/2 tab daily      simvastatin (ZOCOR) 20 mg tablet Take 10 mg by mouth daily. 1/2 tab daily      tramadol (ULTRAM) 50 mg tablet Take 50 mg by mouth every six (6) hours as needed.  aspirin 81 mg tablet Take 81 mg by mouth daily.  pyridoxine (VITAMIN B-6) 100 mg tablet Take 100 mg by mouth daily.  potassium chloride (KLOR-CON M20) 20 mEq tablet Take 20 mEq by mouth two (2) times a day. Past Surgical History:   Procedure Laterality Date    CARDIAC CATHETERIZATION      HX ORTHOPAEDIC      right and left knee surgery    HX OTHER SURGICAL      prostate procedure    LA ANESTH,KNEE AREA SURGERY        Lab Results  Component Value Date/Time   WBC 7.1 09/17/2016 11:54 AM   HGB 13.6 09/17/2016 11:54 AM   HCT 40.1 09/17/2016 11:54 AM   PLATELET 188 31/15/2641 11:54 AM   MCV 80.7 09/17/2016 11:54 AM       Lab Results  Component Value Date/Time   Cholesterol, total 115 03/13/2017 10:33 AM   HDL Cholesterol 60 03/13/2017 10:33 AM   LDL, calculated 44 03/13/2017 10:33 AM   Triglyceride 57 03/13/2017 10:33 AM   CHOL/HDL Ratio 1.8 04/27/2011 11:00 AM       Lab Results  Component Value Date/Time   GFR est AA 77 03/13/2017 10:33 AM   GFR est non-AA 67 03/13/2017 10:33 AM   Creatinine (POC) 0.9 01/04/2013 11:52 AM   Creatinine 1.17 03/13/2017 10:33 AM   BUN 20 03/13/2017 10:33 AM   Sodium 138 03/13/2017 10:33 AM   Potassium 4.3 03/13/2017 10:33 AM   Chloride 102 03/13/2017 10:33 AM   CO2 22 03/13/2017 10:33 AM         Review of Systems   Respiratory: Negative for shortness of breath. Cardiovascular: Negative for chest pain. Physical Exam   Constitutional: He is oriented to person, place, and time. He appears well-developed and well-nourished. No distress. HENT:   Head: Normocephalic and atraumatic. Mouth/Throat: Oropharynx is clear and moist. No oropharyngeal exudate. Eyes: Conjunctivae and EOM are normal. Right eye exhibits no discharge. Left eye exhibits no discharge.    Neck: Normal range of motion. Neck supple. No carotid brutis   Cardiovascular: Normal rate, regular rhythm, normal heart sounds and intact distal pulses. Exam reveals no gallop and no friction rub. No murmur heard. Pulmonary/Chest: Effort normal and breath sounds normal. No respiratory distress. He has no wheezes. He has no rales. He exhibits no tenderness. Abdominal: He exhibits no distension and no mass. There is no tenderness. There is no rebound and no guarding. Musculoskeletal: Normal range of motion. He exhibits edema. He exhibits no tenderness or deformity. Lymphadenopathy:     He has no cervical adenopathy. Neurological: He is alert and oriented to person, place, and time. He has normal reflexes. Coordination normal.   Skin: Skin is warm and dry. No rash noted. He is not diaphoretic. No erythema. No pallor. Psychiatric: He has a normal mood and affect. His behavior is normal.       ASSESSMENT and PLAN  High complex medical decision making    ICD-10-CM ICD-9-CM    1. Uncontrolled type 2 diabetes mellitus without complication, without long-term current use of insulin ([facility]    uncontrolled    Pt now taking metformin BID which I started last visit. However, he stopped taking his glyburide. He reports good readings at home, last a1c was too high. He will f/u in 6 weeks as already scheduled and repeat a1c at that time. Still needs to schedule eye exam   E11.65 250.02    2. Essential hypertension      Uncontrolled, he reports elevated BP readings at home as well, continues aldactone and norvasc, he is not on ACEi or ARB, he does not know why, he denies h/o SE from these medications. Will go ahead and start losartan 50mg daily. I10 401.9    3. Familial hypercholesterolemia    Continues on zocor 20mg daily, controlled last check   E78.01 272.0    4.  Paroxysmal atrial fibrillation ([address]    Follows with Dr. Oziel Aguilera, reports recent office visit him a few weeks ago, will get notes for review, continues on daily ASA   I48.0 427.31    5. Hypokalemia    Pt decided to stop taking his potassium supplement several weeks ago, starting losartan which will increase K levels, will repeat BMP prior to f/u and resume potassium supplement as needed   E87.6 276.8    6. H/O: pneumonia    Completing zpak for this, will repeat chest XR prior to f/u to ensure resolved   Z87.01 V12.61 XR CHEST PA LAT   7. Swelling of both hands    Really more of problem with trigger finger, referred to ortho hand   M79.89 729.81    8. Trigger index finger of right hand    See above M65.321 727.03 REFERRAL TO ORTHOPEDIC SURGERY            Written by [facility], as dictated by Hugo Bonner MD.    Current diagnosis and concerns discussed with pt at length. Understands risks and benefits or current treatment plan and medications and accepts the treatment and medication with any possible risks.   Pt asks appropriate questions which were answered.   Pt instructed to call with any concerns or problems. This note will not be viewable in [address]

## 2017-08-29 NOTE — TELEPHONE ENCOUNTER
Patient's wife, Griselda Talley, states patient needs refill done thru [facility] [address] indicated.  Thank you

## 2017-08-30 NOTE — TELEPHONE ENCOUNTER
Spoke to Chaya Dupont ([facility] Vane Cleary informed pt must go through Card for that rx is not prescribed by Dr. Diamond Randall. Vane Cleary verbalized understanding of information discussed w/ no further questions at this time.

## 2017-09-12 RX ORDER — LOSARTAN POTASSIUM 50 MG/1
TABLET ORAL
Qty: 30 TAB | Refills: 3 | Status: SHIPPED | OUTPATIENT
Start: 2017-09-12 | End: 2017-09-15 | Stop reason: SDUPTHER

## 2017-09-12 NOTE — TELEPHONE ENCOUNTER
Called, spoke to pt. Two pt identifiers confirmed. Pt informed o/v needed. Pt offered and accepted appt for 9/15/17 1215. Pt verbalized understanding of information discussed w/ no further questions at this time.

## 2017-09-15 ENCOUNTER — OFFICE VISIT (OUTPATIENT)
Dept: INTERNAL MEDICINE CLINIC | Age: 62
End: 2017-09-15

## 2017-09-15 VITALS
RESPIRATION RATE: 18 BRPM | OXYGEN SATURATION: 97 % | HEIGHT: 66 IN | SYSTOLIC BLOOD PRESSURE: 135 MMHG | DIASTOLIC BLOOD PRESSURE: 81 MMHG | BODY MASS INDEX: 41.46 KG/M2 | HEART RATE: 62 BPM | TEMPERATURE: 97.8 F | WEIGHT: 258 LBS

## 2017-09-15 DIAGNOSIS — I10 ESSENTIAL HYPERTENSION: ICD-10-CM

## 2017-09-15 DIAGNOSIS — Z23 ENCOUNTER FOR IMMUNIZATION: ICD-10-CM

## 2017-09-15 DIAGNOSIS — E66.01 MORBID OBESITY DUE TO EXCESS CALORIES (HCC): ICD-10-CM

## 2017-09-15 DIAGNOSIS — I48.0 PAROXYSMAL ATRIAL FIBRILLATION (HCC): ICD-10-CM

## 2017-09-15 DIAGNOSIS — N62 GYNECOMASTIA: ICD-10-CM

## 2017-09-15 DIAGNOSIS — G47.33 SLEEP APNEA, OBSTRUCTIVE: ICD-10-CM

## 2017-09-15 DIAGNOSIS — M65.30 TRIGGER FINGER, UNSPECIFIED FINGER, UNSPECIFIED LATERALITY: ICD-10-CM

## 2017-09-15 DIAGNOSIS — E78.01 FAMILIAL HYPERCHOLESTEROLEMIA: ICD-10-CM

## 2017-09-15 RX ORDER — LOSARTAN POTASSIUM 50 MG/1
TABLET ORAL
Qty: 30 TAB | Refills: 6 | Status: SHIPPED | OUTPATIENT
Start: 2017-09-15 | End: 2020-06-11 | Stop reason: SDUPTHER

## 2017-09-15 NOTE — MR AVS SNAPSHOT
Visit Information Date & Time Provider Department Dept. Phone Encounter #  
 9/15/2017 12:15 PM Deborah Kessler, [address] [place] [address],[address] [phone] [id number] Follow-up Instructions Return in about 3 months (around 12/15/2017). Upcoming Health Maintenance Date Due INFLUENZA AGE 9 TO ADULT 8/1/2017 HEMOGLOBIN A1C Q6M 12/12/2017 Pneumococcal 19-64 Highest Risk (3 of 3 - PCV13) 3/13/2018 FOOT EXAM Q1 3/13/2018 MICROALBUMIN Q1 3/13/2018 LIPID PANEL Q1 3/13/2018 EYE EXAM RETINAL OR DILATED Q1 7/12/2018 COLONOSCOPY 1/12/2026 DTaP/Tdap/Td series (2 - Td) 3/13/2027 Allergies as of 9/15/2017  Review Complete On: 6/12/2017 By: Deborah Kessler MD  
  
 Severity Noted Reaction Type Reactions Metformin Medium 05/25/2017   Side Effect Rash Bumps on upper extremities and itching. Sulfa (Sulfonamide Antibiotics)  03/04/2011    Itching Voltaren [Diclofenac Sodium]  03/04/2011    Swelling Current Immunizations  Reviewed on 3/13/2017 Name Date Influenza Vaccine 10/10/2016 Pneumococcal Polysaccharide (PPSV-23) 3/13/2017 Tdap 3/13/2017 ZZZ-RETIRED (DO NOT USE) Pneumococcal Vaccine (Unspecified Type) 2/6/2006 Zoster Vaccine, Live 2/16/2015 Not reviewed this visit You Were Diagnosed With   
  
 Codes Comments Uncontrolled type 2 diabetes mellitus without complication, without long-term current use of insulin ([facility]    -  Primary ICD-10-CM: E11.65 ICD-9-CM: 250.02 Paroxysmal atrial fibrillation (HCC)     ICD-10-CM: I48.0 ICD-9-CM: 427.31 Familial hypercholesterolemia     ICD-10-CM: E78.01 
ICD-9-CM: 272.0 Sleep apnea, obstructive     ICD-10-CM: G47.33 
ICD-9-CM: 327.23 Essential hypertension     ICD-10-CM: I10 
ICD-9-CM: 401.9 Morbid obesity due to excess calories (HCC)     ICD-10-CM: E66.01 
ICD-9-CM: 278.01 Gynecomastia     ICD-10-CM: N62 
ICD-9-CM: 611.1 Trigger finger, unspecified finger, unspecified laterality     ICD-10-CM: M65.30 ICD-9-CM: 727.03   
 BMI 40.0-44.9, adult (HCC)     ICD-10-CM: Z68.41 
ICD-9-CM: V85.41 Vitals BP Pulse Temp Resp Height(growth percentile) Weight(growth percentile) 135/81 (BP 1 Location: Left arm, BP Patient Position: Sitting) 62 97.8 °F (36.6 °C) (Oral) 18 5' 6\" (1.676 m) 258 lb (117 kg) SpO2 BMI Smoking Status 97% 41.64 kg/m2 Never Smoker Vitals History BMI and BSA Data Body Mass Index Body Surface Area  
 41.64 kg/m 2 2.33 m 2 Preferred Pharmacy Pharmacy Name Phone [facility] [address] [place] [address] [phone] Your Updated Medication List  
  
   
This list is accurate as of: 9/15/17 12:20 PM.  Always use your most recent med list.  
  
  
  
  
 aspirin 81 mg tablet Take 81 mg by mouth daily. dronedarone Tab tablet Commonly known as:  Ven[place] See Take 1 Tab by mouth two (2) times daily (with meals). eplerenone 25 mg tablet Commonly known as:  Romelia Apley Take 1 Tab by mouth daily. glipiZIDE SR 2.5 mg CR tablet Commonly known as:  GLUCOTROL XL Take 1 Tab by mouth daily. losartan 50 mg tablet Commonly known as:  COZAAR  
TAKE ONE TABLET BY MOUTH ONCE DAILY  
  
 NORVASC 5 mg tablet Generic drug:  amLODIPine Take 5 mg by mouth daily. PROVENTIL HFA 90 mcg/actuation inhaler Generic drug:  albuterol Take  by inhalation. PULMICORT FLEXHALER 180 mcg/actuation Aepb inhaler Generic drug:  budesonide Take 2 Puffs by inhalation two (2) times a day. STOOL SOFTENER 100 mg capsule Generic drug:  docusate sodium Take 200 mg by mouth daily. VITAMIN B-6 100 mg tablet Generic drug:  pyridoxine (vitamin B6) Take 100 mg by mouth daily. ZOCOR 10 mg tablet Generic drug:  simvastatin Take  by mouth nightly. Prescriptions Sent to Pharmacy Refills losartan (COZAAR) 50 mg tablet 6 Sig: TAKE ONE TABLET BY MOUTH ONCE DAILY Class: Normal  
 Pharmacy: [address] #: [phone] We Performed the Following HEMOGLOBIN A1C WITH EAG [58037 CPT(R)] METABOLIC PANEL, BASIC [15356 CPT(R)] Follow-up Instructions Return in about 3 months (around 12/15/2017). Introducing \[facility]\"" & HEALTH SERVICES! [employer] introduces [employer] patient portal. Now you can access parts of your medical record, email your doctor's office, and request medication refills online. 1. In your internet browser, go to [email or web address] 2. Click on the First Time User? Click Here link in the Sign In box. You will see the New Member Sign Up page. 3. Enter your [employer] Access Code exactly as it appears below. You will not need to use this code after youve completed the sign-up process. If you do not sign up before the expiration date, you must request a new code. · [employer] Access Code: [email or web address] Expires: 12/14/2017 12:14 PM 
 
4. Enter the last four digits of your Social Security Number (xxxx) and Date of Birth (mm/dd/yyyy) as indicated and click Submit. You will be taken to the next sign-up page. 5. Create a [employer] ID. This will be your [employer] login ID and cannot be changed, so think of one that is secure and easy to remember. 6. Create a [employer] password. You can change your password at any time. 7. Enter your Password Reset Question and Answer. This can be used at a later time if you forget your password. 8. Enter your e-mail address. You will receive e-mail notification when new information is available in [address] 9. Click Sign Up. You can now view and download portions of your medical record. 10. Click the Download Summary menu link to download a portable copy of your medical information.  
 
If you have questions, please visit the Frequently Asked Questions section of the [employer]. Remember, [employer] is NOT to be used for urgent needs. For medical emergencies, dial 911. Now available from your iPhone and Android! Please provide this summary of care documentation to your next provider. Your primary care clinician is listed as Franci Onofre. If you have any questions after today's visit, please call [phone].

## 2017-09-15 NOTE — PROGRESS NOTES
RIVAS per Dr. Sandra Avina, flu vaccine given after obtaining the consent form. 0.5ml injected in the L deltoid muscle intramuscularly. Administered by Roberto Elder LPN. VIS given. 9/15/17.

## 2017-09-16 LAB
BUN SERPL-MCNC: 16 MG/DL (ref 8–27)
BUN/CREAT SERPL: 14 (ref 10–24)
CALCIUM SERPL-MCNC: 9.1 MG/DL (ref 8.6–10.2)
CHLORIDE SERPL-SCNC: 103 MMOL/L (ref 96–106)
CO2 SERPL-SCNC: 19 MMOL/L (ref 18–29)
CREAT SERPL-MCNC: 1.11 MG/DL (ref 0.76–1.27)
EST. AVERAGE GLUCOSE BLD GHB EST-MCNC: 154 MG/DL
GLUCOSE SERPL-MCNC: 171 MG/DL (ref 65–99)
HBA1C MFR BLD: 7 % (ref 4.8–5.6)
POTASSIUM SERPL-SCNC: 4.2 MMOL/L (ref 3.5–5.2)
SODIUM SERPL-SCNC: 139 MMOL/L (ref 134–144)

## 2017-09-16 NOTE — PROGRESS NOTES
Increase glipizide from 2.5 to 5mg daily --as dm not at goal, work on diet    Monitor blood sugar, done skip meals    Contact clinic if starts having lows (less than 70 )    Cmp, lipids, a1c, microalbumin 1 week prior to f/u

## 2017-09-18 RX ORDER — GLIPIZIDE 5 MG/1
5 TABLET, FILM COATED, EXTENDED RELEASE ORAL DAILY
Qty: 30 TAB | Refills: 3 | Status: SHIPPED | OUTPATIENT
Start: 2017-09-18 | End: 2020-07-21 | Stop reason: SDUPTHER

## 2017-09-18 NOTE — PROGRESS NOTES
Called, spoke to pt. Two pt identifiers confirmed. Pt informed per Dr. Diamond Randall to increase glipizide from 2.5 to 5mg daily --as dm not at goal, work on diet. Med ordered. Pt informed per Dr. Diamond Randall monitor blood sugar, done skip meals. Contact clinic if starts having lows (less than 70). Pt informed per Dr. Diamond Randall repeat fasting labs prior NOV. Labs ordered and mailed to pt. Pt verbalized understanding of information discussed w/ no further questions at this time.

## 2017-11-03 RX ORDER — EPLERENONE 25 MG/1
TABLET, FILM COATED ORAL
Qty: 30 TAB | Refills: 3 | Status: SHIPPED | OUTPATIENT
Start: 2017-11-03 | End: 2020-06-10

## 2017-11-14 ENCOUNTER — TELEPHONE (OUTPATIENT)
Dept: INTERNAL MEDICINE CLINIC | Age: 62
End: 2017-11-14

## 2017-11-14 NOTE — TELEPHONE ENCOUNTER
MD Josseline Smart, LPN        Caller: Unspecified (Today, 10:45 AM)                     Tomorrow 8:15

## 2017-11-14 NOTE — TELEPHONE ENCOUNTER
Patient's wife, Naima Soto, needs a call back to get an appt for patient with Bad cough & cold symptoms. Please call.  Thank you

## 2017-11-15 ENCOUNTER — OFFICE VISIT (OUTPATIENT)
Dept: INTERNAL MEDICINE CLINIC | Age: 62
End: 2017-11-15

## 2017-11-15 VITALS
BODY MASS INDEX: 41.95 KG/M2 | HEIGHT: 66 IN | OXYGEN SATURATION: 98 % | HEART RATE: 60 BPM | WEIGHT: 261 LBS | TEMPERATURE: 97.9 F | DIASTOLIC BLOOD PRESSURE: 82 MMHG | RESPIRATION RATE: 16 BRPM | SYSTOLIC BLOOD PRESSURE: 144 MMHG

## 2017-11-15 DIAGNOSIS — I48.0 PAROXYSMAL ATRIAL FIBRILLATION (HCC): ICD-10-CM

## 2017-11-15 DIAGNOSIS — R05.9 COUGH: ICD-10-CM

## 2017-11-15 RX ORDER — FLUTICASONE PROPIONATE AND SALMETEROL 250; 50 UG/1; UG/1
1 POWDER RESPIRATORY (INHALATION) 2 TIMES DAILY
Qty: 1 EACH | Refills: 1 | Status: SHIPPED | OUTPATIENT
Start: 2017-11-15 | End: 2017-11-20 | Stop reason: ALTCHOICE

## 2017-11-15 NOTE — MR AVS SNAPSHOT
Visit Information Date & Time Provider Department Dept. Phone Encounter #  
 11/15/2017  9:45 AM Bette Paige, [address] [id number] Follow-up Instructions Return for as scheduled. Your Appointments 12/15/2017  2:45 PM  
ROUTINE CARE with Bette Paige, [address] [place] [address],[address] [facility]) Appt Note: 3 month follow up  
 [address] [facility] [phone]  
[address] [facility] [address] [place] [address] Upcoming Health Maintenance Date Due Pneumococcal 19-64 Highest Risk (3 of 3 - PCV13) 3/13/2018 FOOT EXAM Q1 3/13/2018 MICROALBUMIN Q1 3/13/2018 LIPID PANEL Q1 3/13/2018 HEMOGLOBIN A1C Q6M 3/15/2018 EYE EXAM RETINAL OR DILATED Q1 7/12/2018 COLONOSCOPY 1/12/2026 DTaP/Tdap/Td series (2 - Td) 3/13/2027 Allergies as of 11/15/2017  Review Complete On: 11/15/2017 By: Bette Paige MD  
  
 Severity Noted Reaction Type Reactions Metformin Medium 05/25/2017   Side Effect Rash Bumps on upper extremities and itching. Sulfa (Sulfonamide Antibiotics)  03/04/2011    Itching Voltaren [Diclofenac Sodium]  03/04/2011    Swelling Current Immunizations  Reviewed on 3/13/2017 Name Date Influenza Vaccine 10/10/2016 Influenza Vaccine (Quad) PF 9/15/2017 Pneumococcal Polysaccharide (PPSV-23) 3/13/2017 Tdap 3/13/2017 ZZZ-RETIRED (DO NOT USE) Pneumococcal Vaccine (Unspecified Type) 2/6/2006 Zoster Vaccine, Live 2/16/2015 Not reviewed this visit You Were Diagnosed With   
  
 Codes Comments Uncontrolled type 2 diabetes mellitus without complication, without long-term current use of insulin ([facility]    -  Primary ICD-10-CM: E11.65 ICD-9-CM: 250.02  Class 3 obesity due to excess calories with serious comorbidity and body mass index (BMI) of 40.0 to 44.9 in adult [facility])     ICD-10-CM: E66.09, Z68.41 
 ICD-9-CM: 278.00, V85.41 Cough     ICD-10-CM: R05 ICD-9-CM: 786.2 Paroxysmal atrial fibrillation (HCC)     ICD-10-CM: I48.0 ICD-9-CM: 427.31 Vitals Smoking Status Never Smoker Preferred Pharmacy Pharmacy Name Phone [facility] [address] [place] [address] [phone] Your Updated Medication List  
  
   
This list is accurate as of: 11/15/17 10:11 AM.  Always use your most recent med list.  
  
  
  
  
 aspirin 81 mg tablet Take 81 mg by mouth daily. dronedarone Tab tablet Commonly known as:  Ayden Dross Take 1 Tab by mouth two (2) times daily (with meals). dulaglutide 0.75 mg/0.5 mL sub-q pen Commonly known as:  TRULICITY  
0.5 mL by SubCUTAneous route every seven (7) days. eplerenone 25 mg tablet Commonly known as:  Doy Flick TAKE ONE TABLET BY MOUTH ONCE DAILY  
  
 fluticasone-salmeterol 250-50 mcg/dose diskus inhaler Commonly known as:  ADVAIR Take 1 Puff by inhalation two (2) times a day. glipiZIDE SR 5 mg CR tablet Commonly known as:  GLUCOTROL XL Take 1 Tab by mouth daily. losartan 50 mg tablet Commonly known as:  COZAAR  
TAKE ONE TABLET BY MOUTH ONCE DAILY  
  
 NORVASC 5 mg tablet Generic drug:  amLODIPine Take 5 mg by mouth daily. PROVENTIL HFA 90 mcg/actuation inhaler Generic drug:  albuterol Take  by inhalation. PULMICORT FLEXHALER 180 mcg/actuation Aepb inhaler Generic drug:  budesonide Take 2 Puffs by inhalation two (2) times a day. STOOL SOFTENER 100 mg capsule Generic drug:  docusate sodium Take 200 mg by mouth daily. VITAMIN B-6 100 mg tablet Generic drug:  pyridoxine (vitamin B6) Take 100 mg by mouth daily. ZOCOR 10 mg tablet Generic drug:  simvastatin Take  by mouth nightly. Prescriptions Sent to Pharmacy  Refills  
 dulaglutide (TRULICITY) 9.19 HX/0.8 mL sub-q pen 1  
 Si.5 mL by SubCUTAneous route every seven (7) days. Class: Normal  
 Pharmacy: [address] Ph #: [phone] Route: SubCUTAneous  
 fluticasone-salmeterol (ADVAIR) 250-50 mcg/dose diskus inhaler 1 Sig: Take 1 Puff by inhalation two (2) times a day. Class: Normal  
 Pharmacy: [address] Ph #: [phone] Route: Inhalation Follow-up Instructions Return for as scheduled. To-Do List   
 11/15/2017 Imaging:  XR CHEST PA LAT Patient Instructions Labs 1 week prior to follow up Start weekly trulicity Change pulmicort to advair Adding zyrtec each night and flonase each morning--over the counter Introducing \[facility] [place] [facility]\"" & [facility] SERVICES! Jada Calderón introduces Vision[employer] patient portal. Now you can access parts of your medical record, email your doctor's office, and request medication refills online. 1. In your internet browser, go to [email or web address] 2. Click on the First Time User? Click Here link in the Sign In box. You will see the New Member Sign Up page. 3. Enter your VisionGate Access Code exactly as it appears below. You will not need to use this code after youve completed the sign-up process. If you do not sign up before the expiration date, you must request a new code. · VisionGate Access Code: [email or web address] Expires: 2017 11:14 AM 
 
4. Enter the last four digits of your Social Security Number (xxxx) and Date of Birth (mm/dd/yyyy) as indicated and click Submit. You will be taken to the next sign-up page. 5. Create a WeGreekt ID. This will be your VisionGate login ID and cannot be changed, so think of one that is secure and easy to remember. 6. Create a WeGreekt password. You can change your password at any time. 7. Enter your Password Reset Question and Answer. This can be used at a later time if you forget your password. 8. Enter your e-mail address. You will receive e-mail notification when new information is available in [address] 9. Click Sign Up. You can now view and download portions of your medical record. 10. Click the Download Summary menu link to download a portable copy of your medical information. If you have questions, please visit the Frequently Asked Questions section of the [employer] website. Remember, [employer] is NOT to be used for urgent needs. For medical emergencies, dial 911. Now available from your iPhone and Android! Please provide this summary of care documentation to your next provider. Your primary care clinician is listed as Jesus Bey. If you have any questions after today's visit, please call [phone].

## 2017-11-15 NOTE — TELEPHONE ENCOUNTER
----- Message from Deloras Halsted sent at 11/14/2017  4:51 PM EST -----  Regarding: Dr. Allison Garibay  Pt's wife, Leora Nava, was returning the office's call today (does not know the reason for the call). Best contact number [phone].       Message copied/pasted from Crissy Ramos

## 2017-11-15 NOTE — PROGRESS NOTES
HISTORY OF PRESENT ILLNESS  Damian Pyle is a 58 y.o. male. HPI   Last here 9/15/17. Pt is here for acute care. /f/u     BP today is controled   Continues on losartan 50mg and eplerenone 25mg daily  Lov, he stopped his norvasc (it was not amongst his pill bottles)--unclear if he is currently taking this, will need to review pill bottles at f/u      BS at home running around 79 (skipped a meal), 215 (highest - 2 weeks ago), 140s-150s (mostly) in the AM fasting  Pt forgets to monitor his BS in the PM  Lov, I increased his glipizide from 2.5 to 5mg qAM - reports compliance   Ordered trulicity to help with diabetes and appetite suppression  Recall, metformin caused rashes    Wt is up 3 lbs since last visit  Pt has not been working on his diet and w/l  Pt has been busy moving from his apartment into a hotel  Discussed taking a once weekly medication (trulicity) to help with diabetes and appetite suppression  Discussed common SE to include nausea  Pt does not have a FMHX thyroid cancer  Pt is amenable to starting this today  Ordered trulicity and provided him with teaching  Discussed diet and weight loss     Will have him get labs 1 week prior to f/u    Pt c/o intermittent cough with sputum production and mild ear pain (resolved) x 3 or more weeks  Pt denies F/C, sore throat  Pt has been taking the pulmicort inhaler with mild improvement to sx  Pt then rinses his mouth out with water to prevent thrush   Ordered CXR  Advised him to take zyrtec and flonast OTC daily  Provided him with advair samples   Advised him to stop taking pulmicort while on advair  Pt may have taken advair in the past  If his sx worsen, will order abx    Pt c/o pain in R knee x 10 years  This is a chronic issue for him        PREVENTIVE:  Colonoscopy: 1/12/16, Dr. Francie Kevin, repeat 5 years  PSA: 3/17 0.4, h/o prostate cancer, Dr. Chad Good follows   AAA screen: not needed; never smoker  Tdap: 3/13/2017  Pneumovax: 3/13/2017  [id number]: not yet needed Zostavax: will give script down the road   Flu shot: 9/15/17  Foot exam: 3/13/2017  Microalbumin: 3/17  A1c: 2011 6.9, 3/17 7.3, 6/17 7.3, 9/17 7.0  Eye exam: Dr. Cary Galvan, 7/12/17  Lipids: 3/17 LDL 44    Patient Active Problem List    Diagnosis Date Noted    Familial hypercholesterolemia 03/13/2017    Hypokalemia 03/13/2017    Prostate cancer ([facility] 03/13/2017    Hypertension 03/05/2011    Diabetes mellitus type 2, uncontrolled ([facility] 03/05/2011    Obesity 03/05/2011    Sleep apnea, obstructive 03/05/2011    Atrial fibrillation (HCC) 03/04/2011     Current Outpatient Prescriptions   Medication Sig Dispense Refill    eplerenone (INSPRA) 25 mg tablet TAKE ONE TABLET BY MOUTH ONCE DAILY 30 Tab 3    glipiZIDE SR (GLUCOTROL XL) 5 mg CR tablet Take 1 Tab by mouth daily. 30 Tab 3    losartan (COZAAR) 50 mg tablet TAKE ONE TABLET BY MOUTH ONCE DAILY 30 Tab 6    amLODIPine (NORVASC) 5 mg tablet Take 5 mg by mouth daily.  simvastatin (ZOCOR) 10 mg tablet Take  by mouth nightly.  glipiZIDE SR (GLUCOTROL XL) 2.5 mg CR tablet Take 1 Tab by mouth daily. 30 Tab 3    budesonide (PULMICORT FLEXHALER) 180 mcg/actuation aepb inhaler Take 2 Puffs by inhalation two (2) times a day.  albuterol (PROVENTIL HFA) 90 mcg/actuation inhaler Take  by inhalation.  docusate sodium (STOOL SOFTENER) 100 mg capsule Take 200 mg by mouth daily.  dronedarone (MULTAQ) Tab tablet Take 1 Tab by mouth two (2) times daily (with meals). 60 Tab 12    aspirin 81 mg tablet Take 81 mg by mouth daily.  pyridoxine (VITAMIN B-6) 100 mg tablet Take 100 mg by mouth daily.        Past Surgical History:   Procedure Laterality Date    CARDIAC CATHETERIZATION      HX ORTHOPAEDIC      right and left knee surgery    HX OTHER SURGICAL      prostate procedure    NV ANESTH,KNEE AREA SURGERY        Lab Results  Component Value Date/Time   WBC 7.1 09/17/2016 11:54 AM   HGB 13.6 09/17/2016 11:54 AM   HCT 40.1 09/17/2016 11:54 AM PLATELET 854 32/45/6830 11:54 AM   MCV 80.7 09/17/2016 11:54 AM     Lab Results  Component Value Date/Time   Cholesterol, total 115 03/13/2017 10:33 AM   HDL Cholesterol 60 03/13/2017 10:33 AM   LDL, calculated 44 03/13/2017 10:33 AM   Triglyceride 57 03/13/2017 10:33 AM   CHOL/HDL Ratio 1.8 04/27/2011 11:00 AM     Lab Results  Component Value Date/Time   GFR est non-AA 71 09/15/2017 12:25 PM   GFRNA, POC >60 01/04/2013 11:52 AM   GFR est AA 82 09/15/2017 12:25 PM   GFRAA, POC >60 01/04/2013 11:52 AM   Creatinine 1.11 09/15/2017 12:25 PM   Creatinine (POC) 0.9 01/04/2013 11:52 AM   BUN 16 09/15/2017 12:25 PM   Sodium 139 09/15/2017 12:25 PM   Potassium 4.2 09/15/2017 12:25 PM   Chloride 103 09/15/2017 12:25 PM   CO2 19 09/15/2017 12:25 PM   Magnesium 1.9 03/04/2011 04:30 AM          Review of Systems   Constitutional: Negative for chills and fever. HENT: Negative for ear pain and sore throat. Respiratory: Positive for cough and sputum production. Negative for shortness of breath. Cardiovascular: Negative for chest pain. Physical Exam   Constitutional: He is oriented to person, place, and time. He appears well-developed and well-nourished. No distress. HENT:   Head: Normocephalic and atraumatic. Right Ear: External ear normal.   Left Ear: External ear normal.   Mouth/Throat: No oropharyngeal exudate. Mild cerumen in R TM  Mild erythema in oropharynx    Eyes: Conjunctivae and EOM are normal. Right eye exhibits no discharge. Left eye exhibits no discharge. Neck: Normal range of motion. Neck supple. No thyromegaly present. Cardiovascular: Normal rate, regular rhythm and normal heart sounds. Exam reveals no gallop and no friction rub. No murmur heard. Pulmonary/Chest: Effort normal and breath sounds normal. No respiratory distress. He has no wheezes. He has no rales. He exhibits no tenderness. Musculoskeletal: He exhibits no edema, tenderness or deformity.    Lymphadenopathy:     He has no cervical adenopathy. Neurological: He is alert and oriented to person, place, and time. He has normal reflexes. He exhibits normal muscle tone. Coordination normal.   Skin: Skin is warm and dry. No rash noted. He is not diaphoretic. No erythema. No pallor. Psychiatric: He has a normal mood and affect. His behavior is normal.       ASSESSMENT and PLAN    ICD-10-CM ICD-9-CM    1. Uncontrolled type 2 diabetes mellitus without complication, without long-term current use of insulin ([facility]    Pt reports elevated BS in the AM, he reports compliance with increased dose of glipizide to 5mg, discussed tx options,     will start trulicity 8.97DU weekly to assist with diabetes and w/l, will increase to 1.5mg weekly at f/u, provided samples today    E11.65 250.02    2. Class 3 obesity due to excess calories with serious comorbidity and body mass index (BMI) of 40.0 to 44.9 in adult [facility])    Trulicity to assist with w/l, needs to focus on portion control and reduction of carbs   E66.09 278.00     Z68.41 V85.41    3. Cough    Will get CXR, suspect this is asthmatic in nature, no evidence of bacterial infection, will stop pulmicort and start advair instead, provided samples today    No abx for now   R05 786.2 XR CHEST PA LAT   4. Paroxysmal atrial fibrillation (HCC)    On multaq, follows with cardio   I48.0 427.31         Scribed by [address], as dictated by Dr. Maciej Resendez. Current diagnosis and concerns discussed with pt at length. Pt understands risks and benefits or current treatment plan and medications, and accepts the treatment and medication with any possible risks. Pt asks appropriate questions, which were answered. Pt was instructed to call with any concerns or problems. This note will not be viewable in [address]

## 2017-11-15 NOTE — TELEPHONE ENCOUNTER
Called, spoke to pt. Two pt identifiers confirmed. Pt informed per Dr. Glynn Kaufman to come in for eval.  Pt offered and accepted appt for 11/15/17. 9400. Pt verbalized understanding of information discussed w/ no further questions at this time.

## 2017-11-15 NOTE — PATIENT INSTRUCTIONS
Labs 1 week prior to follow up     Start weekly trulicity     Change pulmicort to advair    Adding zyrtec each night and flonase each morning--over the counter

## 2017-12-14 ENCOUNTER — TELEPHONE (OUTPATIENT)
Dept: INTERNAL MEDICINE CLINIC | Age: 62
End: 2017-12-14

## 2017-12-14 NOTE — TELEPHONE ENCOUNTER
Patient's wife called and would like for someone to call her back. They missed a call. This is in reference to the medication that is not covered by his insurance. Please advise. Thanks.  Víctor Ohara

## 2017-12-14 NOTE — TELEPHONE ENCOUNTER
Called and spoke to pt wife, on HIPPA. Two pt identifiers confirmed. Wife stated pt has enough medication for a prior authorization not to be completed. Pt will have a insurance change soon. Wife stated pt is going to start seeing a diabetic doctor. No further questions or concerns at time of call.

## 2017-12-14 NOTE — TELEPHONE ENCOUNTER
Called and spoke to pt. Two pt identifiers confirmed. Asked pt to call insurance to see what was covered in replace of trulicity due to prior authorization. Pt will call back with information.

## 2017-12-15 ENCOUNTER — OFFICE VISIT (OUTPATIENT)
Dept: INTERNAL MEDICINE CLINIC | Age: 62
End: 2017-12-15

## 2017-12-15 VITALS
DIASTOLIC BLOOD PRESSURE: 87 MMHG | BODY MASS INDEX: 41.78 KG/M2 | WEIGHT: 260 LBS | HEART RATE: 67 BPM | HEIGHT: 66 IN | SYSTOLIC BLOOD PRESSURE: 158 MMHG | OXYGEN SATURATION: 98 % | RESPIRATION RATE: 16 BRPM | TEMPERATURE: 97.7 F

## 2017-12-15 DIAGNOSIS — I10 ESSENTIAL HYPERTENSION: ICD-10-CM

## 2017-12-15 DIAGNOSIS — Z00.00 PHYSICAL EXAM, ANNUAL: Primary | ICD-10-CM

## 2017-12-15 DIAGNOSIS — I48.0 PAROXYSMAL ATRIAL FIBRILLATION (HCC): ICD-10-CM

## 2017-12-15 DIAGNOSIS — C61 PROSTATE CANCER (HCC): ICD-10-CM

## 2017-12-15 DIAGNOSIS — E78.01 FAMILIAL HYPERCHOLESTEROLEMIA: ICD-10-CM

## 2017-12-15 DIAGNOSIS — E11.21 TYPE 2 DIABETES MELLITUS WITH NEPHROPATHY (HCC): ICD-10-CM

## 2017-12-15 DIAGNOSIS — G47.33 SLEEP APNEA, OBSTRUCTIVE: ICD-10-CM

## 2017-12-15 PROBLEM — E66.01 OBESITY, MORBID (HCC): Status: ACTIVE | Noted: 2017-12-15

## 2017-12-15 RX ORDER — CHOLECALCIFEROL (VITAMIN D3) 125 MCG
CAPSULE ORAL DAILY
COMMUNITY

## 2017-12-15 RX ORDER — AMLODIPINE BESYLATE 5 MG/1
7.5 TABLET ORAL DAILY
Qty: 45 TAB | Refills: 3 | Status: SHIPPED | OUTPATIENT
Start: 2017-12-15 | End: 2020-06-10

## 2017-12-15 NOTE — MR AVS SNAPSHOT
Visit Information Date & Time Provider Department Dept. Phone Encounter #  
 12/15/2017  2:45 PM Crescencio Price, [place] [address] [place] [address],[address] [phone] [id number] Follow-up Instructions Return in about 3 months (around 3/15/2018). Upcoming Health Maintenance Date Due Pneumococcal 19-64 Highest Risk (3 of 3 - PCV13) 3/13/2018 FOOT EXAM Q1 3/13/2018 MICROALBUMIN Q1 3/13/2018 LIPID PANEL Q1 3/13/2018 HEMOGLOBIN A1C Q6M 3/15/2018 EYE EXAM RETINAL OR DILATED Q1 7/12/2018 COLONOSCOPY 1/12/2026 DTaP/Tdap/Td series (2 - Td) 3/13/2027 Allergies as of 12/15/2017  Review Complete On: 12/15/2017 By: Nic Mcarthur LPN Severity Noted Reaction Type Reactions Metformin Medium 05/25/2017   Side Effect Rash Bumps on upper extremities and itching. Sulfa (Sulfonamide Antibiotics)  03/04/2011    Itching Voltaren [Diclofenac Sodium]  03/04/2011    Swelling Current Immunizations  Reviewed on 3/13/2017 Name Date Influenza Vaccine 10/10/2016 Influenza Vaccine (Quad) PF 9/15/2017 Pneumococcal Polysaccharide (PPSV-23) 3/13/2017 Tdap 3/13/2017 ZZZ-RETIRED (DO NOT USE) Pneumococcal Vaccine (Unspecified Type) 2/6/2006 Zoster Vaccine, Live 2/16/2015 Not reviewed this visit You Were Diagnosed With   
  
 Codes Comments Physical exam, annual    -  Primary ICD-10-CM: Z00.00 ICD-9-CM: V70.0 Type 2 diabetes mellitus with nephropathy (HCC)     ICD-10-CM: E11.21 
ICD-9-CM: 250.40, 583.81 Essential hypertension     ICD-10-CM: I10 
ICD-9-CM: 401.9 Prostate cancer [facility])     ICD-10-CM: K97 ICD-9-CM: 435 Paroxysmal atrial fibrillation (HCC)     ICD-10-CM: I48.0 ICD-9-CM: 427.31 Sleep apnea, obstructive     ICD-10-CM: G47.33 
ICD-9-CM: 327.23 Familial hypercholesterolemia     ICD-10-CM: E78.01 
ICD-9-CM: 272.0 Vitals BP Pulse Temp Resp Height(growth percentile) Weight(growth percentile) 148/87 (BP 1 Location: Left arm, BP Patient Position: Sitting) 67 97.7 °F (36.5 °C) (Oral) 16 5' 6\" (1.676 m) 260 lb (117.9 kg) SpO2 BMI Smoking Status 98% 41.97 kg/m2 Never Smoker Vitals History BMI and BSA Data Body Mass Index Body Surface Area 41.97 kg/m 2 2.34 m 2 Preferred Pharmacy Pharmacy Name Phone [facility] [address] [place] [address] [phone] Your Updated Medication List  
  
   
This list is accurate as of: 12/15/17  3:19 PM.  Always use your most recent med list.  
  
  
  
  
 aspirin 81 mg tablet Take 81 mg by mouth daily. dronedarone Tab tablet Commonly known as:  Delos [place] Take 1 Tab by mouth two (2) times daily (with meals). eplerenone 25 mg tablet Commonly known as:  Surinder  TAKE ONE TABLET BY MOUTH ONCE DAILY  
  
 glipiZIDE SR 5 mg CR tablet Commonly known as:  GLUCOTROL XL Take 1 Tab by mouth daily. losartan 50 mg tablet Commonly known as:  COZAAR  
TAKE ONE TABLET BY MOUTH ONCE DAILY  
  
 mometasone-formoterol 100-5 mcg/actuation HFA inhaler Commonly known as:  Deatra Jose G Take 2 Puffs by inhalation two (2) times a day. NORVASC 5 mg tablet Generic drug:  amLODIPine Take 5 mg by mouth daily. PROVENTIL HFA 90 mcg/actuation inhaler Generic drug:  albuterol Take  by inhalation. simvastatin 10 mg tablet Commonly known as:  ZOCOR Take 1 Tab by mouth nightly. STOOL SOFTENER 100 mg capsule Generic drug:  docusate sodium Take 200 mg by mouth daily. varicella-zoster recombinant (PF) 50 mcg/0.5 mL Susr injection Commonly known as:  SHINGRIX (PF)  
0.5 mL by IntraMUSCular route once for 1 dose. VITAMIN B-6 100 mg tablet Generic drug:  pyridoxine (vitamin B6) Take 100 mg by mouth daily. VITAMIN D3 2,000 unit Tab Generic drug:  cholecalciferol (vitamin D3) Take  by mouth. Prescriptions Printed Refills varicella-zoster recombinant, PF, (SHINGRIX, PF,) 50 mcg/0.5 mL susr injection 0 Si.5 mL by IntraMUSCular route once for 1 dose. Class: Print Route: IntraMUSCular We Performed the Following REFERRAL TO NUTRITION [REF50 Custom] Follow-up Instructions Return in about 3 months (around 3/15/2018). Referral Information Referral ID Referred By Referred To  
  
 [id number] Monty Osgood Not Available Visits Status Start Date End Date 1 New Request 12/15/17 12/15/18 If your referral has a status of pending review or denied, additional information will be sent to support the outcome of this decision. Introducing \[facility] [place] [facility]\"" & HEALTH SERVICES! [facility] introduces [employer]ch patient portal. Now you can access parts of your medical record, email your doctor's office, and request medication refills online. 1. In your internet browser, go to [email or web address] 2. Click on the First Time User? Click Here link in the Sign In box. You will see the New Member Sign Up page. 3. Enter your Porch Access Code exactly as it appears below. You will not need to use this code after youve completed the sign-up process. If you do not sign up before the expiration date, you must request a new code. · Porch Access Code: [email or web address] Expires: 3/15/2018  3:19 PM 
 
4. Enter the last four digits of your Social Security Number (xxxx) and Date of Birth (mm/dd/yyyy) as indicated and click Submit. You will be taken to the next sign-up page. 5. Create a Brand.nett ID. This will be your Brand.nett login ID and cannot be changed, so think of one that is secure and easy to remember. 6. Create a Brand.nett password. You can change your password at any time. 7. Enter your Password Reset Question and Answer. This can be used at a later time if you forget your password. 8. Enter your e-mail address.  You will receive e-mail notification when new information is available in Precognate. 9. Click Sign Up. You can now view and download portions of your medical record. 10. Click the Download Summary menu link to download a portable copy of your medical information. If you have questions, please visit the Frequently Asked Questions section of the Precogn[employer] website. Remember, P[employer] is NOT to be used for urgent needs. For medical emergencies, dial 911. Now available from your iPhone and Android! Please provide this summary of care documentation to your next provider. Your primary care clinician is listed as Branden Avilez. If you have any questions after today's visit, please call [phone].

## 2017-12-15 NOTE — PROGRESS NOTES
HISTORY OF PRESENT ILLNESS  Lugenia Area is a 58 y.o. male. HPI   Last here 11/15/17. Pt is here for routine care.     BP is 148/87, will repeat this today   Continues on losartan 50mg and eplerenone 25mg daily  Pt is now back on norvasc 5mg daily  Pt took all of these medications today  Will increase norvasc to 7.5mg daily       BS at home running around 132, 220 (dietary indiscretion the night before), 71 (lowest) in the AM fasting  Lov, I started him on trulicity to assist with appetite suppression   However, pt ran out of his trulicity samples   He is unsure if his insurance covers this medication   Pt was not taking glipizide 5mg qAM while on trulicity  However, pt is now back on glipizide 5mg qAM   Discussed that he was supposed to be on trulicity and glipizide   Will have him continue glipizide 5mg qAM  Recall, metformin caused rashes    Wt is up 20 lbs in the last year   Lov, I started him on trulicity to assist with appetite suppression   However, he is no longer taking trulicity   Pt has been working on his diet  Pt now has orthotic shoes, which has improved his knee pain  Pt hopes to start walking more in the near future  Pt would like to see a nutritionist   Ordered nutritionist consult  Discussed diet and weight loss     Will get labs today     Lov, pt c/o an intermittent cough with sputum production and mild ear pain   Reviewed CXR 11/15/17: No acute abnormality and no change.   I provided him with advair, but this was not covered by his insurance  His insurance covered Inder Chin 100/5 two puffs BID, which improved his sx   His sx have since resolved     Pt prev followed with Dr. Ashwini Garcia (uro)  Pt follows with Dr. Malcolm Cameron ([facility]) for prostate cancer  Last visit was recently - will get notes for review  Per pt, PSA was nl  Pt was provided with sildenafil 20mg prn for ER  Discussed it being OK to take as long as he is not taking nitrates     Pt follows with Dr. Greyson Olivia (cardio)  Last visit was 11/17 - will get notes for review  Continues on multaq for h/o a fib and ASA 81mg daily     Pt follows with Dr. Adore Louis (sleep)  Pt is compliant with CPAP nightly  Pt will be getting a device, which will help keep his machine clean     Continues on zantac 75mg daily for reflux - works well, no breakthrough     Continues on zocor 10mg daily for cholesterol     Pt was summoned for jury duty  However, he has diabetes and cares for his wife  Signed form dismissing him from jury duty     Pt has not gotten zostavax  Discussed shingrix being a dead vaccine   Discussed it being more effective than zostavax (~90% effective)  Discussed it being a 2 part series  Advised him to ask his pharmacist about this vaccine       PREVENTIVE:  Colonoscopy: 1/12/16, Dr. Fabby Bella, repeat 5 years  PSA: 3/17 0.4, h/o prostate cancer, Dr. Fawn Celeste follows   AAA screen: not needed; never smoker  Tdap: 3/13/2017  Pneumovax: 3/13/2017  [id number]: not yet needed   Zostavax: will give script down the road   Shingrix: ordered 12/15/17   Flu shot: 9/15/17  Foot exam: 3/13/2017  Microalbumin: 3/17  A1c: 2011 6.9, 3/17 7.3, 6/17 7.3, 9/17 7.0  Eye exam: Dr. Kae Zamudio, 7/12/17  Lipids: 3/17 LDL 44    Patient Active Problem List    Diagnosis Date Noted    Familial hypercholesterolemia 03/13/2017    Hypokalemia 03/13/2017    Prostate cancer ([facility] [address] 03/13/2017    Hypertension 03/05/2011    Diabetes mellitus type 2, uncontrolled ([facility] [address] 03/05/2011    Obesity 03/05/2011    Sleep apnea, obstructive 03/05/2011    Atrial fibrillation ([facility] [address] 03/04/2011     Current Outpatient Prescriptions   Medication Sig Dispense Refill    cholecalciferol, vitamin D3, (VITAMIN D3) 2,000 unit tab Take  by mouth.  simvastatin (ZOCOR) 10 mg tablet Take 1 Tab by mouth nightly. 30 Tab 6    mometasone-formoterol (DULERA) 100-5 mcg/actuation HFA inhaler Take 2 Puffs by inhalation two (2) times a day.  1 Inhaler 3    eplerenone (INSPRA) 25 mg tablet TAKE ONE TABLET BY MOUTH ONCE DAILY 30 Tab 3    losartan (COZAAR) 50 mg tablet TAKE ONE TABLET BY MOUTH ONCE DAILY 30 Tab 6    amLODIPine (NORVASC) 5 mg tablet Take 5 mg by mouth daily.  albuterol (PROVENTIL HFA) 90 mcg/actuation inhaler Take  by inhalation.  docusate sodium (STOOL SOFTENER) 100 mg capsule Take 200 mg by mouth daily.  dronedarone (MULTAQ) Tab tablet Take 1 Tab by mouth two (2) times daily (with meals). 60 Tab 12    aspirin 81 mg tablet Take 81 mg by mouth daily.  pyridoxine (VITAMIN B-6) 100 mg tablet Take 100 mg by mouth daily.  dulaglutide (TRULICITY) 3.41 IJ/3.1 mL sub-q pen 0.5 mL by SubCUTAneous route every seven (7) days. 4 Pen 1    dulaglutide (TRULICITY) 0.02 OQ/9.8 mL sub-q pen 0.5 mL by SubCUTAneous route every seven (7) days. 2 Pen 0    glipiZIDE SR (GLUCOTROL XL) 5 mg CR tablet Take 1 Tab by mouth daily. 30 Tab 3    budesonide (PULMICORT FLEXHALER) 180 mcg/actuation aepb inhaler Take 2 Puffs by inhalation two (2) times a day.        Past Surgical History:   Procedure Laterality Date    CARDIAC CATHETERIZATION      HX ORTHOPAEDIC      right and left knee surgery    HX OTHER SURGICAL      prostate procedure    [place] ANESTH,KNEE AREA SURGERY        Lab Results  Component Value Date/Time   WBC 7.1 09/17/2016 11:54 AM   HGB 13.6 09/17/2016 11:54 AM   HCT 40.1 09/17/2016 11:54 AM   PLATELET 955 30/74/7742 11:54 AM   MCV 80.7 09/17/2016 11:54 AM     Lab Results  Component Value Date/Time   Cholesterol, total 115 03/13/2017 10:33 AM   HDL Cholesterol 60 03/13/2017 10:33 AM   LDL, calculated 44 03/13/2017 10:33 AM   Triglyceride 57 03/13/2017 10:33 AM   CHOL/HDL Ratio 1.8 04/27/2011 11:00 AM     Lab Results  Component Value Date/Time   GFR est non-AA 71 09/15/2017 12:25 PM   GFRNA, POC >60 01/04/2013 11:52 AM   GFR est AA 82 09/15/2017 12:25 PM   GFRAA, POC >60 01/04/2013 11:52 AM   Creatinine 1.11 09/15/2017 12:25 PM   Creatinine (POC) 0.9 01/04/2013 11:52 AM   BUN 16 09/15/2017 12:25 PM   Sodium 139 09/15/2017 12:25 PM   Potassium 4.2 09/15/2017 12:25 PM   Chloride 103 09/15/2017 12:25 PM   CO2 19 09/15/2017 12:25 PM   Magnesium 1.9 03/04/2011 04:30 AM          Review of Systems   Respiratory: Negative for cough and shortness of breath. Cardiovascular: Negative for chest pain. Physical Exam   Constitutional: He is oriented to person, place, and time. He appears well-developed and well-nourished. No distress. HENT:   Head: Normocephalic and atraumatic. Right Ear: External ear normal.   Left Ear: External ear normal.   Mouth/Throat: Oropharynx is clear and moist. No oropharyngeal exudate. Mild cerumen BL TM   Eyes: Conjunctivae and EOM are normal. Right eye exhibits no discharge. Left eye exhibits no discharge. Neck: Normal range of motion. Neck supple. No thyromegaly present. No carotid bruits    Cardiovascular: Normal rate, regular rhythm, normal heart sounds and intact distal pulses. Exam reveals no gallop and no friction rub. No murmur heard. Pulmonary/Chest: Effort normal and breath sounds normal. No respiratory distress. He has no wheezes. He has no rales. He exhibits no tenderness. Abdominal: Soft. He exhibits no distension and no mass. There is no tenderness. There is no rebound and no guarding. Musculoskeletal: Normal range of motion. He exhibits no edema, tenderness or deformity. Lymphadenopathy:     He has no cervical adenopathy. Neurological: He is alert and oriented to person, place, and time. He has normal reflexes. He exhibits normal muscle tone. Coordination normal.   Skin: Skin is warm and dry. No rash noted. He is not diaphoretic. No erythema. No pallor. Psychiatric: He has a normal mood and affect. His behavior is normal.       ASSESSMENT and PLAN    ICD-10-CM ICD-9-CM    1.  Physical exam, annual    Discussed diet and weight loss, he is up 10 lbs in the last year, had tried to do trulicity to assist with w/l but it is not covered by his insurance neither are the other GLP1 agonists, he would like to see a nutritionist and I have a placed a referral for him, flu shot UTD, gave rx for shingrix, COLO UTD, PSA monitored by Dr. Estela Tinoco currently, Tdap and pneumovax UTD, eye exam UTD, labs ordered   Z00.00 V70.0 REFERRAL TO NUTRITION   2. Type 2 diabetes mellitus with nephropathy (HCC)    Last a1c was barely controled, since then pt has had a higher dose glipizide, he reports adequate control at home, will continue this and check a1c today   E11.21 250.40      583.81    3. Essential hypertension    BP initially elevated, repeat BP not improved, continue losartan 50, eplerenone 25, will increase norvasc to 7.5mg daily    I10 401.9    4. Prostate cancer [facility])    H/o, now follows with Dr. Estela Tinoco, will get his notes for review   C61 185    5. Paroxysmal atrial fibrillation (HCC)    Remains in nsr on multaq, follows with Dr. Nicole Bhat, on ASA for anticoagulation, UTD   I48.0 427.31    6. Sleep apnea, obstructive    Compliant with CPAP   G47.33 327.23    7. Familial hypercholesterolemia    Controled on zocor   E78.01 272.0         Depression screen reviewed and negative. Scribed by Jose Timmons of [facility], as dictated by Dr. Maciej Resendez. Current diagnosis and concerns discussed with pt at length. Pt understands risks and benefits or current treatment plan and medications, and accepts the treatment and medication with any possible risks. Pt asks appropriate questions, which were answered. Pt was instructed to call with any concerns or problems. This note will not be viewable in [address]

## 2017-12-16 LAB
ALBUMIN SERPL-MCNC: 4.2 G/DL (ref 3.6–4.8)
ALBUMIN/GLOB SERPL: 1.3 {RATIO} (ref 1.2–2.2)
ALP SERPL-CCNC: 87 IU/L (ref 39–117)
ALT SERPL-CCNC: 31 IU/L (ref 0–44)
AST SERPL-CCNC: 22 IU/L (ref 0–40)
BILIRUB SERPL-MCNC: 0.7 MG/DL (ref 0–1.2)
BUN SERPL-MCNC: 18 MG/DL (ref 8–27)
BUN/CREAT SERPL: 14 (ref 10–24)
CALCIUM SERPL-MCNC: 9.3 MG/DL (ref 8.6–10.2)
CHLORIDE SERPL-SCNC: 102 MMOL/L (ref 96–106)
CHOLEST SERPL-MCNC: 144 MG/DL (ref 100–199)
CO2 SERPL-SCNC: 23 MMOL/L (ref 18–29)
CREAT SERPL-MCNC: 1.29 MG/DL (ref 0.76–1.27)
EST. AVERAGE GLUCOSE BLD GHB EST-MCNC: 151 MG/DL
GFR SERPLBLD CREATININE-BSD FMLA CKD-EPI: 59 ML/MIN/1.73
GFR SERPLBLD CREATININE-BSD FMLA CKD-EPI: 68 ML/MIN/1.73
GLOBULIN SER CALC-MCNC: 3.2 G/DL (ref 1.5–4.5)
GLUCOSE SERPL-MCNC: 125 MG/DL (ref 65–99)
HBA1C MFR BLD: 6.9 % (ref 4.8–5.6)
HDLC SERPL-MCNC: 50 MG/DL
LDLC SERPL CALC-MCNC: 69 MG/DL (ref 0–99)
MICROALBUMIN UR-MCNC: 70.6 UG/ML
POTASSIUM SERPL-SCNC: 4.1 MMOL/L (ref 3.5–5.2)
PROT SERPL-MCNC: 7.4 G/DL (ref 6–8.5)
SODIUM SERPL-SCNC: 140 MMOL/L (ref 134–144)
TRIGL SERPL-MCNC: 125 MG/DL (ref 0–149)
VLDLC SERPL CALC-MCNC: 25 MG/DL (ref 5–40)

## 2018-07-05 ENCOUNTER — TELEPHONE (OUTPATIENT)
Dept: INTERNAL MEDICINE CLINIC | Age: 63
End: 2018-07-05

## 2018-10-10 ENCOUNTER — HOSPITAL ENCOUNTER (OUTPATIENT)
Dept: LAB | Age: 63
Discharge: HOME | End: 2018-10-10

## 2018-10-10 LAB
CREAT UR-MCNC: 124 MG/DL
MICROALBUMIN UR-MCNC: 11.5 MG/DL
MICROALBUMIN/CREAT UR-RTO: 93 MG/G (ref 0–30)

## 2018-10-10 PROCEDURE — 82043 UR ALBUMIN QUANTITATIVE: CPT | Performed by: INTERNAL MEDICINE

## 2018-10-10 PROCEDURE — 82570 ASSAY OF URINE CREATININE: CPT | Performed by: INTERNAL MEDICINE

## 2018-11-22 NOTE — ED PROVIDER NOTES
HPI Comments: Shagufta Patino is a 64 y.o. male, pmhx DM / HTN / CAD / A-fib / prostate CA, who presents ambulatory to the ED c/o progressively worsening diffuse itching rash to his BL hands and upper extremities over the last 4 days. Pt denies having a dermatologist to follow up with. He reports a hx of similar sxs when his hand soaps are mixed with antibacterial liquids. Wife notes pt was recently treated with ABX for PNA; pt reports finishing his course of ABX without difficulty and denies any other changes in medications. He denies taking any anticoagulants for his hx of A-fib. Pt specifically denies any recent outdoor or chemical exposure, new soaps, new lotions, or detergents. PCP: Pro Sales MD    PMHx: Significant for DM, HTN, CAD, CA, A-fib, asthma  PSHx: Significant for knee surgery, prostatectomy  Social Hx: -tobacco, -EtOH, -Illicit Drugs    There are no other complaints, changes, or physical findings at this time. The history is provided by the patient. Past Medical History:   Diagnosis Date    Asthma     Atrial fibrillation ([address]     CAD (coronary artery disease)     Cancer (HCC)     prostate    Diabetes ([address]     Enlarged heart     Hypertension     Sleep apnea        Past Surgical History:   Procedure Laterality Date    CARDIAC CATHETERIZATION      HX ORTHOPAEDIC      right and left knee surgery    HX OTHER SURGICAL      prostate procedure    [place] ANESTH,KNEE AREA SURGERY           Family History:   Problem Relation Age of Onset    Alcohol abuse Mother     Heart Disease Mother     Hypertension Mother     Hypertension Father     Heart Disease Father        Social History     Social History    Marital status:      Spouse name: N/A    Number of children: N/A    Years of education: N/A     Occupational History    Not on file. Social History Main Topics    Smoking status: Never Smoker    Smokeless tobacco: Never Used    Alcohol use No    Drug use:  No Reason For Visit    Aiden Dash presents for an INR check and patient teaching. Referred By   Referred by  [facility] admissions 3/9-3/14, 3/17-3/21, 3/24-3/25/17  Had Ventral Hernia repair, SBO, DVT Popliteal vein Left calf vein ( provoked ). PMH HTN, DM, Malignant neoplasm of prostrate  Sent home with Warfarin 5 mg and Lovenox 100 mg BID. Started on Xarelto April/2017  Restarted on Warfarin October 5, 2017. History of Present Illness    Symptoms:. The patient is currently asymptomatic.    no: nosebleeds. Additional Screening:. No. Missed dose(s). No: Extra dose(s). No: Significant medication changes since last visit . No: Vitamin K dietary changes. Avoids Vitamin K. No: S/Sx(s) of bleeding or bruising. No: ETOH Intake. No: Falls/Injuries. No: Acute Illness. No: Procedure/Hospital/ER Visit. Current Meds   1. AmLODIPine Besylate 10 MG Oral Tablet; TAKE 1 TABLET EVERY DAY; Therapy: 27IGW1244 to (Evaluate:01Jan2018)  Requested for: 40MQF4969; Last   Rx:[id number] Ordered   2. Atorvastatin Calcium 10 MG Oral Tablet; TAKE 1 TABLET BY MOUTH EVERY DAY; Therapy: 06ZFC0249 to (Evaluate:05Jun2017)  Requested for: 86YNJ1347; Last   Rx:[id number] Ordered   3. Famotidine 40 MG Oral Tablet; TAKE 1 TABLET AT BEDTIME; Therapy: 78RCG8035 to (Evaluate:14Mar2015)  Requested for: 73WZO0867; Last   Rx:[id number] Ordered   4. Fluticasone Propionate 50 MCG/ACT Nasal Suspension; USE 2 SPRAYS IN EACH   NOSTRIL ONCE DAILY; Therapy: 89BEW7270 to (Evaluate:28Tdh6317)  Requested for: 87POT0701; Last   Rx:[id number] Ordered   5. Losartan Potassium 100 MG Oral Tablet; TAKE 1 TABLET EVERY DAY; Therapy: 77OVC9847 to (Evaluate:31Fxd7420)  Requested for: 63JIN4439; Last   Rx:[id number] Ordered   6. MetFORMIN HCl  MG Oral Tablet Extended Release 24 Hour; TAKE 1 TABLET   TWICE DAILY WITH BREAKFAST AND DINNER;    Therapy: 60QXB4474 to ([id number])  Requested for: 27Apr2017; Last   Rx:[id number] Ordered   7. Metoprolol Succinate  MG Oral Tablet Extended Release 24 Hour; TAKE 1 TABLET   EVERY DAY; Therapy: 69GIS5948 to (Evaluate:13Oct2017)  Requested for: 54YNZ1282; Last   Rx:[id number] Ordered   8. OneTouch Lancets Miscellaneous; CHECK BLOOD SUGAR UP TO 3 TIMES DAILY AS   DIRECTED; Therapy: 68ZIC2198 to (Evaluate:12Mar2017)  Requested for: 27GYX0845; Last   Rx:83Afm6135 Ordered   9. OneTouch Verio In [employer]; Test up to 3 times daily AS DIRECTED; Therapy: 38IVK4397 to (Dexter Brandyn)  Requested for: 69Jzx6441; Last   Rx:08Kld2170 Ordered   10. Tamsulosin HCl - 0.4 MG Oral Capsule; TAKE ONE CAPSULE BY MOUTH DAILY; Therapy: 14UJJ8514 to (Evaluate:44Imp4671)  Requested for: 39Yqp2081; Last    Rx:31Glc1743 Ordered   11. Warfarin Sodium 5 MG Oral Tablet; TAKE 1 TABLET BY MOUTH AS DIRECTED; Therapy: 03FHK5129 to ([phone])  Requested for: 05Oct2017; Last    Rx:80Hbw0893 Ordered   12. Xarelto 20 MG Oral Tablet; TAKE 1 TABLET DAILY; Therapy: 97Mxx2705 to (Evaluate:06Apr2018)  Requested for: 57TEO2512; Last    Rx:50Fwi3851 Ordered   13. Zolpidem Tartrate 10 MG Oral Tablet; TAKE 1 TABLET AT BEDTIME AS NEEDED FOR    SLEEP; Therapy: 71MDJ4986 to (Evaluate:52Srm7966); Last Rx:[id number] Ordered    Results/Data  Coumadin New    ** Printed in Appendix #1 below. Assessment   1. Acute deep vein thrombosis (DVT) of left lower extremity, unspecified vein (I82.402)   2. Long term current use of anticoagulant therapy (Z79.01)    Indication: DVT popliteal vein Left calf vein (3/24/17) Provoked. Goal INR Range: 2-3. Estimated Duration: 6 months. Anticoagulation Clinic Order Date: 3/27/17. INR is subtherapeutic today. INR 1.3. Orders      Additional Dosing Instructions: Take 2 tablets today. Follow-up: October 30, 2017 at 10:40 am.    Provided patient with verbal and written dosing instructions. Pt verbalized understanding.       Signatures  Sexual activity: Yes     Partners: Female     Other Topics Concern    Not on file     Social History Narrative         ALLERGIES: Sulfa (sulfonamide antibiotics) and Voltaren [diclofenac sodium]    Review of Systems   Constitutional: Negative for chills and fever. HENT: Negative. Eyes: Negative. Respiratory: Negative for cough, chest tightness and shortness of breath. Cardiovascular: Negative for chest pain and leg swelling. Gastrointestinal: Negative for abdominal pain, diarrhea, nausea and vomiting. Endocrine: Negative. Genitourinary: Negative for difficulty urinating and dysuria. Musculoskeletal: Negative for myalgias. Skin: Positive for rash (BUE). Neurological: Negative. Psychiatric/Behavioral: Negative. All other systems reviewed and are negative. Vitals:    05/23/17 0108   BP: 145/82   Pulse: 80   Resp: 16   Temp: 97.8 °F (36.6 °C)   SpO2: 100%   Weight: 118 kg (260 lb 2.3 oz)   Height: 5' 6\" (1.676 m)            Physical Exam   Constitutional: He is oriented to person, place, and time. He appears well-developed and well-nourished. No distress. HENT:   Head: Normocephalic and atraumatic. Nose: Nose normal.   Mouth/Throat: No oropharyngeal exudate. Eyes: Conjunctivae and EOM are normal. Pupils are equal, round, and reactive to light. Neck: Normal range of motion. Neck supple. No JVD present. Cardiovascular: Normal rate, regular rhythm, normal heart sounds and intact distal pulses. Exam reveals no friction rub. No murmur heard. Pulmonary/Chest: Effort normal and breath sounds normal. No stridor. No respiratory distress. He has no wheezes. He has no rales. Abdominal: Soft. Bowel sounds are normal. He exhibits no distension. There is no tenderness. There is no rebound. Musculoskeletal: Normal range of motion. He exhibits no tenderness. Neurological: He is alert and oriented to person, place, and time. No cranial nerve deficit.    Skin: Skin is warm, dry Electronically signed by : Abdullahi Gibbons R.N.; Oct 19 2017  4:11PM CST    Appendix #1     Coumadin New   Patient: Param Gruber; : 1948; MRN: [id number]      [id number] [id number] 64WQB4460 28QGJ9677 74JXG6259 [id number]   PROTHROMBIN TIME         INR         INR, FINGERSTICK 1.3  1.4  2.6  2.5  1.7     CURRENT DOSE 35 mg/wk  35 mg/wk  7.5 mg daily    7.5 mg M & 5 mg daily prior with Lovenox 100 mg BID    RECOMMENDED DOSE 42.5 mg/wk  35 mg/wk  discontinue Warfarin will start Xarelto 17  52.5 mg/wk   7.5 mg ,    Warfarin Mon 7.5 mg 5 mg  7.5 mg     Warfarin Tues 5 mg 5 mg  7.5 mg  7.5 mg   Warfarin Wed 7.5 mg 5 mg  7.5 mg  7.5 mg   Warfarin Thurs 5 mg 5 mg  7.5 mg 10 mg    Warfarin Fri 7.5 mg 5 mg  7.5 mg 7.5 mg    Warfarin Sat 5 mg 5 mg  7.5 mg 10 mg    Warfarin Sun 5 mg 5 mg  7.5 mg 5 mg and intact. Rash noted. No ecchymosis and no petechiae noted. Rash is papular (fine papular rash to dorsum of bilateral hands, extensor surfaces of forearm/elbow). Rash is not pustular and not urticarial. He is not diaphoretic. No erythema. Psychiatric: He has a normal mood and affect. His speech is normal and behavior is normal. Judgment and thought content normal. Cognition and memory are normal.   Nursing note and vitals reviewed. MDM  Number of Diagnoses or Management Options  Pruritic rash:   Diagnosis management comments: DDX:  Contact dermatitis, insect bites    Plan:  benadryl    Impression:  Pruritic rash       Amount and/or Complexity of Data Reviewed  Review and summarize past medical records: yes    Patient Progress  Patient progress: stable      Procedures      I reviewed our electronic medical record system for any past medical records that were available that may contribute to the patients current condition, the nursing notes and and vital signs from today's visit    Nursing notes will be reviewed as they become available in realtime while the pt has been in the ED. Selwyn Blair MD      3:38 AM  Progress note:  Pt ready for discharge. Will follow up as instructed. All questions have been answered, pt voiced understanding and agreement with plan. If narcotics were prescribed, pt was advised not to drive or operate heavy machinery. If abx were prescribed, pt advised that diarrhea and rash are possible side effects of the medications. Specific return precautions provided as well as instructions to return to the ED should sx worsen at any time. Selwyn Blair MD    MEDICATIONS GIVEN:  Medications   diphenhydrAMINE (BENADRYL) capsule 50 mg (not administered)       IMPRESSION:  1. Pruritic rash        PLAN:  1. Current Discharge Medication List      START taking these medications    Details   cetirizine (ZYRTEC) 10 mg tablet Take 1 Tab by mouth daily.   Qty: 20 Tab, Refills: 0 hydrocortisone (CORTIZONE) 0.5 % ointment Apply  to affected area two (2) times a day for 10 days. Apply to affected area  Qty: 15 g, Refills: 0           2. Follow-up Information     Follow up With Details Comments Contact Info    Fang Tolliver MD Schedule an appointment as soon as possible for a visit in 2 days  [facility] [facility] [place]  [phone]      [facility] Call today  [address] [phone]        Return to ED if worse     DISCHARGE NOTE:  3:38 AM  The patient's results have been reviewed with family and/or caregiver. They verbally convey their understanding and agreement of the patient's signs, symptoms, diagnosis, treatment, and prognosis. They additionally agree to follow up as recommended in the discharge instructions or to return to the Emergency Room should the patient's condition change prior to their follow-up appointment. The family and/or caregiver verbally agrees with the care-plan and all of their questions have been answered. The discharge instructions have also been provided to the them along with educational information regarding the patient's diagnosis and a list of reasons why the patient would want to return to the ER prior to their follow-up appointment should their condition change. Written by Balta Kaufman ED Scribe, as dictated by Kaur Francisco MD.         This note is prepared by Balta Kaufman, acting as Scribe for MD Kaur Palacios MD : The scribe's documentation has been prepared under my direction and personally reviewed by me in its entirety. I confirm that the note above accurately reflects all work, treatment, procedures, and medical decision making performed by me. This note will not be viewable in [address]

## 2019-06-24 ENCOUNTER — HOSPITAL ENCOUNTER (OUTPATIENT)
Dept: LAB | Age: 64
Discharge: HOME | End: 2019-06-24

## 2019-06-24 LAB
ALBUMIN SERPL-MCNC: 4 G/DL (ref 3.5–5)
ALBUMIN/GLOB SERPL: 1 {RATIO} (ref 1.1–2.2)
ALP SERPL-CCNC: 123 U/L (ref 45–117)
ALT SERPL-CCNC: 35 U/L (ref 12–78)
ANION GAP SERPL CALC-SCNC: 11 MMOL/L (ref 5–15)
AST SERPL-CCNC: 26 U/L (ref 15–37)
BILIRUB SERPL-MCNC: 0.7 MG/DL (ref 0.2–1)
BUN SERPL-MCNC: 13 MG/DL (ref 6–20)
BUN/CREAT SERPL: 11 (ref 12–20)
CALCIUM SERPL-MCNC: 9 MG/DL (ref 8.5–10.1)
CHLORIDE SERPL-SCNC: 106 MMOL/L (ref 97–108)
CHOLEST SERPL-MCNC: 126 MG/DL
CO2 SERPL-SCNC: 22 MMOL/L (ref 21–32)
CREAT SERPL-MCNC: 1.15 MG/DL (ref 0.7–1.3)
CREAT UR-MCNC: 71.3 MG/DL
EST. AVERAGE GLUCOSE BLD GHB EST-MCNC: 166 MG/DL
GLOBULIN SER CALC-MCNC: 4.1 G/DL (ref 2–4)
GLUCOSE SERPL-MCNC: 140 MG/DL (ref 65–100)
HBA1C MFR BLD: 7.4 % (ref 4.2–6.3)
HDLC SERPL-MCNC: 61 MG/DL
HDLC SERPL: 2.1 {RATIO} (ref 0–5)
LDLC SERPL CALC-MCNC: 54.2 MG/DL (ref 0–100)
LIPID PROFILE,FLP: NORMAL
MICROALBUMIN UR-MCNC: 19.5 MG/DL
MICROALBUMIN/CREAT UR-RTO: 273 MG/G (ref 0–30)
POTASSIUM SERPL-SCNC: 3.7 MMOL/L (ref 3.5–5.1)
PROT SERPL-MCNC: 8.1 G/DL (ref 6.4–8.2)
SODIUM SERPL-SCNC: 139 MMOL/L (ref 136–145)
TRIGL SERPL-MCNC: 54 MG/DL (ref ?–150)
VLDLC SERPL CALC-MCNC: 10.8 MG/DL

## 2019-06-24 PROCEDURE — 82043 UR ALBUMIN QUANTITATIVE: CPT

## 2019-06-24 PROCEDURE — 83036 HEMOGLOBIN GLYCOSYLATED A1C: CPT

## 2019-06-24 PROCEDURE — 80053 COMPREHEN METABOLIC PANEL: CPT

## 2019-06-24 PROCEDURE — 80061 LIPID PANEL: CPT

## 2019-07-18 RX ORDER — HYDROCHLOROTHIAZIDE 12.5 MG/1
12.5 TABLET ORAL DAILY
COMMUNITY
End: 2020-06-10

## 2019-07-18 RX ORDER — AMLODIPINE BESYLATE AND ATORVASTATIN CALCIUM 10; 10 MG/1; MG/1
1 TABLET, FILM COATED ORAL DAILY
COMMUNITY
End: 2020-06-10

## 2019-07-19 ENCOUNTER — ANESTHESIA EVENT (OUTPATIENT)
Dept: ENDOSCOPY | Age: 64
End: 2019-07-19
Payer: MEDICAID

## 2019-07-19 ENCOUNTER — HOSPITAL ENCOUNTER (OUTPATIENT)
Age: 64
Setting detail: OUTPATIENT SURGERY
Discharge: HOME | End: 2019-07-19
Attending: SPECIALIST | Admitting: SPECIALIST
Payer: MEDICAID

## 2019-07-19 ENCOUNTER — ANESTHESIA (OUTPATIENT)
Dept: ENDOSCOPY | Age: 64
End: 2019-07-19
Payer: MEDICAID

## 2019-07-19 VITALS
DIASTOLIC BLOOD PRESSURE: 80 MMHG | BODY MASS INDEX: 40.02 KG/M2 | WEIGHT: 249 LBS | HEIGHT: 66 IN | SYSTOLIC BLOOD PRESSURE: 121 MMHG | TEMPERATURE: 98.1 F | OXYGEN SATURATION: 99 % | HEART RATE: 88 BPM | RESPIRATION RATE: 17 BRPM

## 2019-07-19 LAB
GLUCOSE BLD STRIP.AUTO-MCNC: 135 MG/DL (ref 65–100)
SERVICE CMNT-IMP: ABNORMAL

## 2019-07-19 PROCEDURE — 74011250636 HC RX REV CODE- 250/636

## 2019-07-19 PROCEDURE — 82962 GLUCOSE BLOOD TEST: CPT

## 2019-07-19 PROCEDURE — 74011250636 HC RX REV CODE- 250/636: Performed by: SPECIALIST

## 2019-07-19 PROCEDURE — 76040000019: Performed by: SPECIALIST

## 2019-07-19 PROCEDURE — 76060000031 HC ANESTHESIA FIRST 0.5 HR: Performed by: SPECIALIST

## 2019-07-19 PROCEDURE — 88305 TISSUE EXAM BY PATHOLOGIST: CPT

## 2019-07-19 PROCEDURE — 77030013992 HC SNR POLYP ENDOSC BSC -B: Performed by: SPECIALIST

## 2019-07-19 RX ORDER — DEXTROMETHORPHAN/PSEUDOEPHED 2.5-7.5/.8
1.2 DROPS ORAL
Status: DISCONTINUED | OUTPATIENT
Start: 2019-07-19 | End: 2019-07-19 | Stop reason: HOSPADM

## 2019-07-19 RX ORDER — SODIUM CHLORIDE 0.9 % (FLUSH) 0.9 %
5-40 SYRINGE (ML) INJECTION AS NEEDED
Status: DISCONTINUED | OUTPATIENT
Start: 2019-07-19 | End: 2019-07-19 | Stop reason: HOSPADM

## 2019-07-19 RX ORDER — MIDAZOLAM HYDROCHLORIDE 1 MG/ML
.25-5 INJECTION, SOLUTION INTRAMUSCULAR; INTRAVENOUS
Status: DISCONTINUED | OUTPATIENT
Start: 2019-07-19 | End: 2019-07-19 | Stop reason: HOSPADM

## 2019-07-19 RX ORDER — SODIUM CHLORIDE 0.9 % (FLUSH) 0.9 %
5-40 SYRINGE (ML) INJECTION EVERY 8 HOURS
Status: DISCONTINUED | OUTPATIENT
Start: 2019-07-19 | End: 2019-07-19 | Stop reason: HOSPADM

## 2019-07-19 RX ORDER — PROPOFOL 10 MG/ML
INJECTION, EMULSION INTRAVENOUS AS NEEDED
Status: DISCONTINUED | OUTPATIENT
Start: 2019-07-19 | End: 2019-07-19 | Stop reason: HOSPADM

## 2019-07-19 RX ORDER — NALOXONE HYDROCHLORIDE 0.4 MG/ML
0.4 INJECTION, SOLUTION INTRAMUSCULAR; INTRAVENOUS; SUBCUTANEOUS
Status: DISCONTINUED | OUTPATIENT
Start: 2019-07-19 | End: 2019-07-19 | Stop reason: HOSPADM

## 2019-07-19 RX ORDER — LIDOCAINE HYDROCHLORIDE 20 MG/ML
INJECTION, SOLUTION EPIDURAL; INFILTRATION; INTRACAUDAL; PERINEURAL AS NEEDED
Status: DISCONTINUED | OUTPATIENT
Start: 2019-07-19 | End: 2019-07-19 | Stop reason: HOSPADM

## 2019-07-19 RX ORDER — SODIUM CHLORIDE 9 MG/ML
50 INJECTION, SOLUTION INTRAVENOUS CONTINUOUS
Status: DISCONTINUED | OUTPATIENT
Start: 2019-07-19 | End: 2019-07-19 | Stop reason: HOSPADM

## 2019-07-19 RX ORDER — FLUMAZENIL 0.1 MG/ML
0.2 INJECTION INTRAVENOUS
Status: DISCONTINUED | OUTPATIENT
Start: 2019-07-19 | End: 2019-07-19 | Stop reason: HOSPADM

## 2019-07-19 RX ORDER — FENTANYL CITRATE 50 UG/ML
50 INJECTION, SOLUTION INTRAMUSCULAR; INTRAVENOUS
Status: DISCONTINUED | OUTPATIENT
Start: 2019-07-19 | End: 2019-07-19 | Stop reason: HOSPADM

## 2019-07-19 RX ADMIN — SODIUM CHLORIDE 50 ML/HR: 900 INJECTION, SOLUTION INTRAVENOUS at 10:29

## 2019-07-19 RX ADMIN — PROPOFOL 220 MG: 10 INJECTION, EMULSION INTRAVENOUS at 11:18

## 2019-07-19 RX ADMIN — LIDOCAINE HYDROCHLORIDE 40 MG: 20 INJECTION, SOLUTION EPIDURAL; INFILTRATION; INTRACAUDAL; PERINEURAL at 11:05

## 2019-07-19 NOTE — ANESTHESIA POSTPROCEDURE EVALUATION
Procedure(s):  COLONOSCOPY  ENDOSCOPIC POLYPECTOMY. total IV anesthesia    Anesthesia Post Evaluation        Patient location during evaluation: PACU  Note status: Adequate. Level of consciousness: responsive to verbal stimuli and sleepy but conscious  Pain management: satisfactory to patient  Airway patency: patent  Anesthetic complications: no  Cardiovascular status: acceptable  Respiratory status: acceptable  Hydration status: acceptable  Comments: +Post-Anesthesia Evaluation and Assessment    Patient: Aaron Anguiano MRN: [id number]  SSN: [id number]   YOB: 1955  Age: 61 y.o. Sex: male      Cardiovascular Function/Vital Signs    /80   Pulse 88   Temp 36.7 °C (98.1 °F)   Resp 17   Ht 5' 6\" (1.676 m)   Wt 112.9 kg (249 lb)   SpO2 99%   BMI 40.19 kg/m²     Patient is status post Procedure(s):  COLONOSCOPY  ENDOSCOPIC POLYPECTOMY. Nausea/Vomiting: Controlled. Postoperative hydration reviewed and adequate. Pain:  Pain Scale 1: Numeric (0 - 10) (07/19/19 1137)  Pain Intensity 1: 0 (07/19/19 1137)   Managed. Neurological Status: At baseline. Mental Status and Level of Consciousness: Arousable. Pulmonary Status:   O2 Device: Room air (07/19/19 1146)   Adequate oxygenation and airway patent. Complications related to anesthesia: None    Post-anesthesia assessment completed. No concerns. Signed By: Filomena Reyes MD    7/19/2019  Post anesthesia nausea and vomiting:  controlled      Vitals Value Taken Time   /77 7/19/2019 11:51 AM   Temp 36.7 °C (98.1 °F) 7/19/2019 11:37 AM   Pulse 90 7/19/2019 11:53 AM   Resp 21 7/19/2019 11:53 AM   SpO2 99 % 7/19/2019 11:53 AM   Vitals shown include unvalidated device data.

## 2019-07-19 NOTE — PROCEDURES
Colonoscopy Procedure Note    Indications:   Personal history of colon polyps (screening only)    Referring Physician: Lilia Nye MD  Anesthesia/Sedation: MAC anesthesia Propofol  Endoscopist:  Dr. Natalie Barroso    Procedure in Detail:  Informed consent was obtained for the procedure, including sedation. Risks of perforation, hemorrhage, adverse drug reaction, and aspiration were discussed. The patient was placed in the left lateral decubitus position. Based on the pre-procedure assessment, including review of the patient's medical history, medications, allergies, and review of systems, he had been deemed to be an appropriate candidate for moderate sedation; he was therefore sedated with the medications listed above. The patient was monitored continuously with ECG tracing, pulse oximetry, blood pressure monitoring, and direct observations. A rectal examination was performed. The [id number] was inserted into the rectum and advanced under direct vision to the cecum, which was identified by the ileocecal valve and appendiceal orifice. The quality of the colonic preparation was adequate. A careful inspection was made as the colonoscope was withdrawn, including a retroflexed view of the rectum; findings and interventions are described below. Appropriate photodocumentation was obtained. Findings:     1. Scope advanced to the cecum. 2.  Preparation was adequate. 3.  + 4 mm polyp in the ascending colon s/p cold snare removal.  4.  Mild distal radiation proctitis  5. Sigmoid diverticulosis. Therapies:  As above    Specimen: Specimens were collected as described above and sent to pathology. Complications: None were encountered during the procedure. EBL: < 10 ml.     Recommendations:   -f/u path  -repeat colonoscopy in 5 years    Signed By: Abigail Andrews MD                        July 19, 2019

## 2019-07-19 NOTE — ANESTHESIA PREPROCEDURE EVALUATION
Anesthetic History   No history of anesthetic complications            Review of Systems / Medical History  Patient summary reviewed, nursing notes reviewed and pertinent labs reviewed    Pulmonary        Sleep apnea: CPAP    Asthma        Neuro/Psych   Within defined limits           Cardiovascular    Hypertension        Dysrhythmias : atrial fibrillation  CAD and cardiac stents    Exercise tolerance: >4 METS  Comments: 3-14-14 EKG:SB, rate 58, LVH  4-27-11 Cath: 65% EF, No AS, mild CAD   GI/Hepatic/Renal               Comments: Constipation, hematochezia, colon polyps, internal hemorrhoids Endo/Other    Diabetes: type 2    Morbid obesity     Other Findings              Physical Exam    Airway  Mallampati: III    Neck ROM: normal range of motion   Mouth opening: Normal     Cardiovascular    Rhythm: regular  Rate: normal         Dental  No notable dental hx       Pulmonary  Breath sounds clear to auscultation               Abdominal  GI exam deferred       Other Findings            Anesthetic Plan    ASA: 3  Anesthesia type: total IV anesthesia and general          Induction: Intravenous  Anesthetic plan and risks discussed with: Patient      Propofol MAC

## 2019-07-19 NOTE — H&P
Gastroenterology Outpatient History and Physical    Patient: Haroon Pastor    Physician: Janelle Cisneros MD    Vital Signs: Blood pressure (!) 142/91, pulse (!) 101, temperature 98.2 °F (36.8 °C), resp. rate 13, height 5' 6\" (1.676 m), weight 112.9 kg (249 lb), SpO2 98 %. Allergies: Allergies   Allergen Reactions    Metformin Rash     Bumps on upper extremities and itching.  Sulfa (Sulfonamide Antibiotics) Itching    Voltaren [Diclofenac Sodium] Swelling       Chief Complaint: H/O Adenomatous polyps    History of Present Illness: 60 yo BM for repeat colonoscopy for h/o polyps. Justification for Procedure: above    History:  Past Medical History:   Diagnosis Date    Asthma     Atrial fibrillation ([facility] [address]     CAD (coronary artery disease)     Cancer (HCC)     prostate 48 radiation treatments    Diabetes ([facility] [address]     Enlarged heart     Hypertension     Sleep apnea     uses CPAP      Past Surgical History:   Procedure Laterality Date    CARDIAC CATHETERIZATION      HX ORTHOPAEDIC      right and left knee surgery scoped    HX OTHER SURGICAL      prostate procedure-radiation tx    [place] ANESTH,KNEE AREA SURGERY        Social History     Socioeconomic History    Marital status:      Spouse name: Not on file    Number of children: Not on file    Years of education: Not on file    Highest education level: Not on file   Tobacco Use    Smoking status: Never Smoker    Smokeless tobacco: Never Used   Substance and Sexual Activity    Alcohol use: No    Drug use: No    Sexual activity: Yes     Partners: Female      Family History   Problem Relation Age of Onset    Alcohol abuse Mother     Heart Disease Mother     Hypertension Mother     Hypertension Father     Heart Disease Father        Medications:   Prior to Admission medications    Medication Sig Start Date End Date Taking? Authorizing Provider   amLODIPine-atorvastatin (CADUET) 10-10 mg per tablet Take 1 Tab by mouth daily.    Yes Provider, Historical   hydroCHLOROthiazide (HYDRODIURIL) 12.5 mg tablet Take 12.5 mg by mouth daily. Yes Provider, Historical   cholecalciferol, vitamin D3, (VITAMIN D3) 2,000 unit tab Take  by mouth daily. Yes Provider, Historical   mometasone-formoterol (DULERA) 100-5 mcg/actuation HFA inhaler Take 2 Puffs by inhalation two (2) times a day. 11/20/17  Yes Kris Bellamy MD   glipiZIDE SR (GLUCOTROL XL) 5 mg CR tablet Take 1 Tab by mouth daily. Patient taking differently: Take 10 mg by mouth two (2) times a day. 9/18/17  Yes Kris Bellamy MD   losartan (COZAAR) 50 mg tablet TAKE ONE TABLET BY MOUTH ONCE DAILY 9/15/17  Yes Kris Bellamy MD   dronedarone (MULTAQ) Tab tablet Take 1 Tab by mouth two (2) times daily (with meals). 3/5/11  Yes Fatoumata Limon MD   pyridoxine (VITAMIN B-6) 100 mg tablet Take 100 mg by mouth daily. Yes Other, MD Sunshine   amLODIPine (NORVASC) 5 mg tablet Take 1.5 Tabs by mouth daily. 12/15/17   Kris Bellamy MD   eplerenone (INSPRA) 25 mg tablet TAKE ONE TABLET BY MOUTH ONCE DAILY 11/3/17   Kris Bellamy MD   albuterol (PROVENTIL HFA) 90 mcg/actuation inhaler Take 2 Puffs by inhalation every four (4) hours as needed. Other, MD Sunshine   docusate sodium (STOOL SOFTENER) 100 mg capsule Take 200 mg by mouth daily. 4/27/11   Provider, Historical       Physical Exam:   General: alert, no distress   HEENT: Head: Normocephalic, no lesions, without obvious abnormality.    Heart: regular rate and rhythm, S1, S2 normal, no murmur, click, rub or gallop   Lungs: chest clear, no wheezing, rales, normal symmetric air entry   Abdominal: soft, NT/ND + BS   Neurological: Grossly normal   Extremities: extremities normal, atraumatic, no cyanosis or edema     Findings/Diagnosis: H/O Polyps    Plan of Care/Planned Procedure: Colonoscopy

## 2019-07-19 NOTE — ROUTINE PROCESS
[facility]  1955  [id number]    Situation:  Verbal report received from: Malachi Rae RN  Procedure: Procedure(s):  COLONOSCOPY  ENDOSCOPIC POLYPECTOMY    Background:    Preoperative diagnosis: CONSTIPATION, HEMATOCHEZIA, HX COLON POLYPS, INTERNAL HEMORRHOIDS  Postoperative diagnosis: polyp    :  Dr. Dayna Muhammad  Assistant(s): Endoscopy Technician-1: Nancy Rodrigues  Endoscopy RN-1: Godwin Velasquez RN    Specimens:   ID Type Source Tests Collected by Time Destination   1 : ascending colon polyp Saline Colon, Ascending  Elizabeth Araiza MD 7/19/2019 1113 Pathology     H. Pylori  no    Assessment:  Intra-procedure medications     Anesthesia gave intra-procedure sedation and medications, see anesthesia flow sheet yes    Intravenous fluids: NS@ KVO     Vital signs stable     Abdominal assessment: round and soft     Recommendation:  Discharge patient per MD order.   Family or Lisandra Nordmann, daughter  Permission to share finding with family or friend yes

## 2019-07-19 NOTE — DISCHARGE INSTRUCTIONS
Sushil Martinez  [id number]  1955    COLON DISCHARGE INSTRUCTIONS  Discomfort:  Redness at IV site- apply warm compress to area; if redness or soreness persist- contact your physician  There may be a slight amount of blood passed from the rectum  Gaseous discomfort- walking, belching will help relieve any discomfort  You may not operate a vehicle for 12 hours  You may not engage in an occupation involving machinery or appliances for rest of today  You may not drink alcoholic beverages for at least 12 hours  Avoid making any critical decisions for at least 24 hour  DIET:   Regular diet. - however -  remember your colon is empty and a heavy meal will produce gas. Avoid these foods:  vegetables, fried / greasy foods, carbonated drinks for today. MEDICATIONS:        Regarding Aspirin or Nonsteroidal medications, please see below. ACTIVITY:  You may resume your normal daily activities it is recommended that you spend the remainder of the day resting -  avoid any strenuous activity. CALL M.D. ANY SIGN OF:  Increasing pain, nausea, vomiting  Abdominal distension (swelling)  New increased bleeding (oral or rectal)  Fever (chills)  Pain in chest area  Bloody discharge from nose or mouth  Shortness of breath    ONLY  Tylenol as needed for pain.       Follow-up Instructions:   Call Dr. Cat Calvillo for results of procedure / biopsy in 4-5 days at telephone #  [phone]              Repeat Colonoscopy in 5 years

## 2019-09-24 ENCOUNTER — HOSPITAL ENCOUNTER (OUTPATIENT)
Dept: LAB | Age: 64
Discharge: HOME | End: 2019-09-24

## 2019-09-24 LAB
EST. AVERAGE GLUCOSE BLD GHB EST-MCNC: 174 MG/DL
HBA1C MFR BLD: 7.7 % (ref 4.2–6.3)

## 2019-09-24 PROCEDURE — 83036 HEMOGLOBIN GLYCOSYLATED A1C: CPT

## 2020-06-11 ENCOUNTER — OFFICE VISIT (OUTPATIENT)
Dept: INTERNAL MEDICINE CLINIC | Age: 65
End: 2020-06-11

## 2020-06-11 VITALS
OXYGEN SATURATION: 98 % | HEIGHT: 66 IN | SYSTOLIC BLOOD PRESSURE: 144 MMHG | BODY MASS INDEX: 39.31 KG/M2 | TEMPERATURE: 98.4 F | HEART RATE: 72 BPM | RESPIRATION RATE: 16 BRPM | WEIGHT: 244.6 LBS | DIASTOLIC BLOOD PRESSURE: 88 MMHG

## 2020-06-11 DIAGNOSIS — I48.0 PAROXYSMAL ATRIAL FIBRILLATION (HCC): ICD-10-CM

## 2020-06-11 DIAGNOSIS — E66.01 OBESITY, MORBID (HCC): ICD-10-CM

## 2020-06-11 DIAGNOSIS — E11.65 UNCONTROLLED TYPE 2 DIABETES MELLITUS WITH HYPERGLYCEMIA (HCC): Primary | ICD-10-CM

## 2020-06-11 DIAGNOSIS — E78.01 FAMILIAL HYPERCHOLESTEROLEMIA: ICD-10-CM

## 2020-06-11 DIAGNOSIS — I10 ESSENTIAL HYPERTENSION: ICD-10-CM

## 2020-06-11 DIAGNOSIS — G47.33 SLEEP APNEA, OBSTRUCTIVE: ICD-10-CM

## 2020-06-11 LAB
ALBUMIN UR QL STRIP: 150 MG/L
CREATININE, URINE POC: 200 MG/DL
HBA1C MFR BLD HPLC: 7.7 %
MICROALBUMIN/CREAT RATIO POC: ABNORMAL MG/G

## 2020-06-11 RX ORDER — HYDROCHLOROTHIAZIDE 25 MG/1
25 TABLET ORAL DAILY
Qty: 90 TAB | Refills: 1 | Status: SHIPPED | OUTPATIENT
Start: 2020-06-11 | End: 2021-01-21 | Stop reason: SDUPTHER

## 2020-06-11 RX ORDER — AMLODIPINE BESYLATE 5 MG/1
5 TABLET ORAL DAILY
Qty: 90 TAB | Refills: 1 | Status: SHIPPED | OUTPATIENT
Start: 2020-06-11 | End: 2021-01-21 | Stop reason: SDUPTHER

## 2020-06-11 RX ORDER — POTASSIUM CHLORIDE 1500 MG/1
20 TABLET, FILM COATED, EXTENDED RELEASE ORAL DAILY
Qty: 90 TAB | Refills: 1 | Status: SHIPPED | OUTPATIENT
Start: 2020-06-11 | End: 2021-01-21

## 2020-06-11 RX ORDER — LOSARTAN POTASSIUM 50 MG/1
TABLET ORAL
Qty: 30 TAB | Refills: 6 | Status: SHIPPED | OUTPATIENT
Start: 2020-06-11 | End: 2021-01-21 | Stop reason: SDUPTHER

## 2020-06-11 NOTE — PROGRESS NOTES
Room  1      Chief Complaint   Patient presents with    Establish Care    Medication Refill    Dizziness     takes meclizine from time to time     Patient is living in a hotel. 1. Have you been to the ER, urgent care clinic since your last visit? Hospitalized since your last visit? No    2. Have you seen or consulted any other health care providers outside of the [address] [place] [address] since your last visit? Include any pap smears or colon screening. No    Health Maintenance Due   Topic Date Due    Shingrix Vaccine Age 49> (1 of 2) 07/24/2005    Foot Exam Q1  03/13/2018    Eye Exam Retinal or Dilated  06/12/2019    MICROALBUMIN Q1  06/24/2020    Lipid Screen  06/24/2020       Learning Assessment 6/10/2020   PRIMARY LEARNER Patient   PRIMARY LANGUAGE ENGLISH   LEARNER PREFERENCE PRIMARY DEMONSTRATION   ANSWERED BY patient   RELATIONSHIP SELF     3 most recent PHQ Screens 6/11/2020   Little interest or pleasure in doing things Not at all   Feeling down, depressed, irritable, or hopeless Several days   Total Score PHQ 2 1     Recent Travel Screening and Travel History documentation     Travel Screening       Question Response     In the last month, have you been in contact with someone who was confirmed or suspected to have Coronavirus / COVID-19? No / Unsure     Do you have any of the following symptoms? None of these     Have you traveled internationally in the last month? No      Travel History   Travel since 05/11/20     No documented travel since 05/11/20                  AVS was given to parent with understanding verbalized.

## 2020-06-11 NOTE — PATIENT INSTRUCTIONS
Goal home blood pressure is to average less than 130/80 Home Blood Pressure Test: About This Test 
What is it? A home blood pressure test allows you to keep track of your blood pressure at home. Blood pressure is a measure of the force of blood against the walls of your arteries. Blood pressure readings include two numbers, such as 130/80 (say \"130 over 80\"). The first number is the systolic pressure. The second number is the diastolic pressure. Why is this test done? You may do this test at home to: · Find out if you have high blood pressure. · Track your blood pressure if you have high blood pressure. · Track how well medicine is working to reduce high blood pressure. · Check how lifestyle changes, such as weight loss and exercise, are affecting blood pressure. How do you prepare for the test? 
For at least 30 minutes before you take your blood pressure, don't exercise or use caffeine, tobacco, or medicines that raise blood pressure. Take your blood pressure while you feel comfortable and relaxed. Sit quietly with both feet on the floor for at least 5 minutes before the test. 
How is the test done? · Sit with your arm slightly bent and resting on a table so that your upper arm is at the same level as your heart. · Roll up your sleeve or take off your shirt to expose your upper arm. · Wrap the blood pressure cuff around your upper arm so that the lower edge of the cuff is about 1 inch above the bend of your elbow. Proceed with the following steps depending on if you are using an automatic or manual pressure monitor. Automatic blood pressure monitors · Press the on/off button on the automatic monitor and wait until the ready-to-measure \"heart\" symbol appears next to zero in the display window. · Press the start button. The cuff will inflate and deflate by itself. · Your blood pressure numbers will appear on the screen. · Write your numbers in your log book, along with the date and time. Manual blood pressure monitors · Place the earpieces of a stethoscope in your ears, and place the bell of the stethoscope over the artery, just below the cuff. · Close the valve on the rubber inflating bulb. · Squeeze the bulb rapidly with your opposite hand to inflate the cuff until the dial or column of mercury reads about 30 mm Hg higher than your usual systolic pressure. If you do not know your usual pressure, inflate the cuff to 210 mm Hg or until the pulse at your wrist disappears. · Open the pressure valve just slightly by twisting or pressing the valve on the bulb. · As you watch the pressure slowly fall, note the level on the dial at which you first start to hear a pulsing or tapping sound through the stethoscope. This is your systolic blood pressure. · Continue letting the air out slowly. The sounds will become muffled and will finally disappear. Note the pressure when the sounds completely disappear. This is your diastolic blood pressure. Let out all the remaining air. · Write your numbers in your log book, along with the date and time. Follow-up care is a key part of your treatment and safety. Be sure to make and go to all appointments, and call your doctor if you are having problems. It's also a good idea to keep a list of the medicines you take. Where can you learn more? Go to http://vero-theresa.info/ Enter C427 in the search box to learn more about \"Home Blood Pressure Test: About This Test.\" Current as of: December 16, 2019               Content Version: 12.5 © 8454-2420 [employer]. Care instructions adapted under license by [employer] (which disclaims liability or warranty for this information). If you have questions about a medical condition or this instruction, always ask your healthcare professional. [address] any warranty or liability for your use of this information.

## 2020-06-11 NOTE — PROGRESS NOTES
JENNYFER Lane Seen is a 59 y.o. male, he presents today for: Establishing care. 59year old man. Sochx: lives with wife. 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 - 2 duaghters and 3 grandsons. - 3 brothers that are living, near. - patient is very interested in Music. Plays a myriad of insturments. Enjoys jazz, rock and roll. 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    - currently living in hotel, notes that this is not his preference, but is hoping to have more stable situation soon. He and his wife live together. Has had some leg weakness, difficulty standing. Has been dealing with some recent losses. Measures blood sugar at home:    - numbers have been \"awesome\" these past 2 weeks. - blood sugars in -145 in the morning. Diet review. - is trying to eat more fiber.    - drinks mainly water. PMH/PSH: reviewed and updated  Sochx:  reports that he has never smoked. He has never used smokeless tobacco. He reports that he does not drink alcohol or use drugs. Famhx: reviewed and updated     All: Allergies   Allergen Reactions    Metformin Rash     Bumps on upper extremities and itching.  Sulfa (Sulfonamide Antibiotics) Itching    Voltaren [Diclofenac Sodium] Swelling     Med:   Current Outpatient Medications   Medication Sig    potassium chloride SR (K-TAB) 20 mEq tablet Take 1 Tab by mouth daily.  hydroCHLOROthiazide (HYDRODIURIL) 25 mg tablet Take 1 Tab by mouth daily.  losartan (COZAAR) 50 mg tablet TAKE ONE TABLET BY MOUTH ONCE DAILY    amLODIPine (NORVASC) 5 mg tablet Take 1 Tab by mouth daily.  atorvastatin (LIPITOR) 10 mg tablet Take 10 mg by mouth daily.  cetirizine (ZYRTEC) 10 mg tablet Take 10 mg by mouth.  pramoxine (Proctofoam) 1 % topical foam Apply  to affected area three (3) times daily as needed for Hemorrhoids.  cholecalciferol, vitamin D3, (VITAMIN D3) 2,000 unit tab Take  by mouth daily.     mometasone-formoterol (DULERA) 100-5 mcg/actuation HFA inhaler Take 2 Puffs by inhalation two (2) times a day.  glipiZIDE SR (GLUCOTROL XL) 5 mg CR tablet Take 1 Tab by mouth daily. (Patient taking differently: Take 5 mg by mouth two (2) times a day.)    albuterol (PROVENTIL HFA) 90 mcg/actuation inhaler Take 2 Puffs by inhalation every four (4) hours as needed.  docusate sodium (STOOL SOFTENER) 100 mg capsule Take 200 mg by mouth daily.  dronedarone (MULTAQ) Tab tablet Take 1 Tab by mouth two (2) times daily (with meals).  pyridoxine (VITAMIN B-6) 100 mg tablet Take 100 mg by mouth daily. No current facility-administered medications for this visit. Review of Systems   Constitutional: Negative for chills, fever and malaise/fatigue. Respiratory: Negative for shortness of breath. Cardiovascular: Negative for chest pain. Gastrointestinal: Negative for abdominal pain. Neurological: Negative for dizziness and headaches. Psychiatric/Behavioral: Negative for depression, substance abuse and suicidal ideas. The patient is not nervous/anxious and does not have insomnia. PE:  Blood pressure 144/88, pulse 72, temperature 98.4 °F (36.9 °C), temperature source Oral, resp. rate 16, height 5' 6\" (1.676 m), weight 244 lb 9.6 oz (110.9 kg), SpO2 98 %. Body mass index is 39.48 kg/m². Physical Exam  Vitals signs and nursing note reviewed. Constitutional:       General: He is not in acute distress. Appearance: Normal appearance. He is well-developed and normal weight. HENT:      Head: Normocephalic and atraumatic. Right Ear: Tympanic membrane normal.      Left Ear: Tympanic membrane normal.      Nose: Nose normal.      Mouth/Throat:      Mouth: Mucous membranes are moist.   Eyes:      Extraocular Movements: Extraocular movements intact. Conjunctiva/sclera: Conjunctivae normal.      Pupils: Pupils are equal, round, and reactive to light.    Neck:      Musculoskeletal: Normal range of motion and neck supple. Cardiovascular:      Rate and Rhythm: Normal rate and regular rhythm. Pulses: Normal pulses. Heart sounds: Normal heart sounds. Pulmonary:      Effort: Pulmonary effort is normal.      Breath sounds: Normal breath sounds. Abdominal:      General: Abdomen is flat. Bowel sounds are normal.      Comments: Central adiposity   Musculoskeletal: Normal range of motion. Right lower leg: Edema present. Left lower leg: Edema present. Skin:     General: Skin is warm. Capillary Refill: Capillary refill takes less than 2 seconds. Neurological:      General: No focal deficit present. Mental Status: He is alert and oriented to person, place, and time. Psychiatric:         Mood and Affect: Mood normal.       Diabetic foot exam:   Left: Filament test 6/6   Pulse DP: 2+ (normal)   Pulse PT: 2+ (normal)   Deformities: None  Right: Filament test 6/6   Pulse DP: 2+ (normal)   Pulse PT: 2+ (normal)   Deformities: Mild - overgrown toenails.      Diabetes Data Review:   Diabetic Foot and Eye Exam HM Status   Topic Date Due    Diabetic Foot Care  03/13/2018    Eye Exam  06/12/2019     Lab Results   Component Value Date/Time    Microalbumin/Creat ratio (mg/g creat) 273 (H) 06/24/2019 11:36 AM    Microalbumin,urine random 19.50 06/24/2019 11:36 AM     Lab Results   Component Value Date/Time    Hemoglobin A1c 7.7 (H) 09/24/2019 11:40 AM    Hemoglobin A1c (POC) 7.7 06/11/2020 04:09 PM     Lab Results   Component Value Date/Time    LDL, calculated 54.2 06/24/2019 11:36 AM     Labs:   Results for orders placed or performed in visit on 06/11/20   AMB POC URINE, MICROALBUMIN, SEMIQUANT (3 RESULTS)   Result Value Ref Range    ALBUMIN, URINE  Negative mg/L    CREATININE, URINE  mg/dL    Microalbumin/creat ratio (POC)  <30 MG/G   AMB POC HEMOGLOBIN A1C   Result Value Ref Range    Hemoglobin A1c (POC) 7.7 %        A/P:  59 y.o. male    ICD-10-CM ICD-9-CM 1. Uncontrolled type 2 diabetes mellitus with hyperglycemia (HCC) E11.65 250.02 AMB POC URINE, MICROALBUMIN, SEMIQUANT (3 RESULTS)      AMB POC HEMOGLOBIN A1C      REFERRAL TO DIABETIC EDUCATION      METABOLIC PANEL, COMPREHENSIVE      LIPID PANEL      CBC WITH AUTOMATED DIFF       DIABETES FOOT EXAM   2. Paroxysmal atrial fibrillation (HCC) I48.0 427.31    3. Obesity, morbid ([facility] [address] E66.01 278.01    4. Sleep apnea, obstructive G47.33 327.23    5. Essential hypertension I10 401.9 potassium chloride SR (K-TAB) 20 mEq tablet      hydroCHLOROthiazide (HYDRODIURIL) 25 mg tablet      losartan (COZAAR) 50 mg tablet      amLODIPine (NORVASC) 5 mg tablet      METABOLIC PANEL, COMPREHENSIVE      LIPID PANEL      CBC WITH AUTOMATED DIFF   6. Familial hypercholesterolemia E78.01 272.0 LIPID PANEL     Diabetes: remains above goal control range of < 7.0. On glipizide 2 times daily. States he cannot take metformin due to gynecomastia changes (system lists rash). - continue daily ASA, statin   - continue glipizide. - try improvement through diet and exercises   - ref to diabetes education. - if this is not controlling more, will plan to add trulicity, patient reprots did well on this previously but may have financial barrier if copay    Cardiac: follows with Dr. Javier Mcneal, last visit on record 12/2019  - CAD, dilated LV, EF 65% on 2011 cath. - Paroxysmal Atrial fib, \"maintaining SR or Multaq\" not on anticoagulation.   - HTN: numbers at home are better than in office. 130s and lowest 121.   -continue current medications, consider increasing and stopping losartan if home number remain high.      History of prostate cancer: radiation proctitis noted on 7/2019 colonoscopy. Follows with Dr. Corinna Silva     asthma: on Mikey Pickett takes this daily. (still has a lot at home) agrees to restart taking daily. ROBERT: on CPAP: reports continued use.  Sleep medicine phsyician is currently Dr. Cherelle Velasco     Obesity: severe, patient is currently working on adding back in exercise. GERD: taking apple cider vinegar 5 ml before meal. And finds this is controllingsymptoms.     - He was given AVS and expressed understanding with the diagnosis and plan as discussed. Follow-up and Dispositions    · Return in about 3 months (around 9/11/2020) for well man visit. No future appointments.

## 2020-06-20 LAB
ALBUMIN SERPL-MCNC: 4 G/DL (ref 3.8–4.8)
ALBUMIN/GLOB SERPL: 1.2 {RATIO} (ref 1.2–2.2)
ALP SERPL-CCNC: 87 IU/L (ref 39–117)
ALT SERPL-CCNC: 23 IU/L (ref 0–44)
AST SERPL-CCNC: 17 IU/L (ref 0–40)
BASOPHILS # BLD AUTO: 0.1 X10E3/UL (ref 0–0.2)
BASOPHILS NFR BLD AUTO: 1 %
BILIRUB SERPL-MCNC: 0.9 MG/DL (ref 0–1.2)
BUN SERPL-MCNC: 16 MG/DL (ref 8–27)
BUN/CREAT SERPL: 13 (ref 10–24)
CALCIUM SERPL-MCNC: 9 MG/DL (ref 8.6–10.2)
CHLORIDE SERPL-SCNC: 101 MMOL/L (ref 96–106)
CHOLEST SERPL-MCNC: 110 MG/DL (ref 100–199)
CO2 SERPL-SCNC: 23 MMOL/L (ref 20–29)
CREAT SERPL-MCNC: 1.27 MG/DL (ref 0.76–1.27)
EOSINOPHIL # BLD AUTO: 0.3 X10E3/UL (ref 0–0.4)
EOSINOPHIL NFR BLD AUTO: 3 %
ERYTHROCYTE [DISTWIDTH] IN BLOOD BY AUTOMATED COUNT: 14.8 % (ref 11.6–15.4)
GLOBULIN SER CALC-MCNC: 3.3 G/DL (ref 1.5–4.5)
GLUCOSE SERPL-MCNC: 222 MG/DL (ref 65–99)
HCT VFR BLD AUTO: 40 % (ref 37.5–51)
HDLC SERPL-MCNC: 49 MG/DL
HGB BLD-MCNC: 13.4 G/DL (ref 13–17.7)
IMM GRANULOCYTES # BLD AUTO: 0 X10E3/UL (ref 0–0.1)
IMM GRANULOCYTES NFR BLD AUTO: 0 %
LDLC SERPL CALC-MCNC: 42 MG/DL (ref 0–99)
LYMPHOCYTES # BLD AUTO: 2.7 X10E3/UL (ref 0.7–3.1)
LYMPHOCYTES NFR BLD AUTO: 28 %
MCH RBC QN AUTO: 26.2 PG (ref 26.6–33)
MCHC RBC AUTO-ENTMCNC: 33.5 G/DL (ref 31.5–35.7)
MCV RBC AUTO: 78 FL (ref 79–97)
MONOCYTES # BLD AUTO: 0.6 X10E3/UL (ref 0.1–0.9)
MONOCYTES NFR BLD AUTO: 6 %
NEUTROPHILS # BLD AUTO: 6.1 X10E3/UL (ref 1.4–7)
NEUTROPHILS NFR BLD AUTO: 62 %
PLATELET # BLD AUTO: 252 X10E3/UL (ref 150–450)
POTASSIUM SERPL-SCNC: 3.6 MMOL/L (ref 3.5–5.2)
PROT SERPL-MCNC: 7.3 G/DL (ref 6–8.5)
RBC # BLD AUTO: 5.11 X10E6/UL (ref 4.14–5.8)
SODIUM SERPL-SCNC: 137 MMOL/L (ref 134–144)
TRIGL SERPL-MCNC: 95 MG/DL (ref 0–149)
VLDLC SERPL CALC-MCNC: 19 MG/DL (ref 5–40)
WBC # BLD AUTO: 9.8 X10E3/UL (ref 3.4–10.8)

## 2020-06-26 NOTE — PROGRESS NOTES
Cholesterol well controled. Continue current cholesterol medication. Kidney function is stable from prior. Liver function is good. No new changes in medication. Keep follow-up as planned.

## 2020-07-09 ENCOUNTER — CLINICAL SUPPORT (OUTPATIENT)
Dept: DIABETES SERVICES | Age: 65
End: 2020-07-09

## 2020-07-09 DIAGNOSIS — E11.65 UNCONTROLLED TYPE 2 DIABETES MELLITUS WITH HYPERGLYCEMIA (HCC): ICD-10-CM

## 2020-07-09 NOTE — PROGRESS NOTES
[employer]  Diabetes Self-Management Education   Pre-program Assessment    Reason for Referral: Follow-up DSMES  Referral Source: Chai Cabrera MD    Patient received diabetes education through [facility] \"not too long ago\". He would like to focus education on pattern management and understanding what's impacting his blood sugars due to \"highs and lows\". He reports understanding diabetes and how to prevent long term complications. Metric Patient responses (7/9/2020)   A1c  (Goal: 7%)   Recent value:   Lab Results   Component Value Date/Time    Hemoglobin A1c 7.7 (H) 09/24/2019 11:40 AM    Hemoglobin A1c (POC) 7.7 06/11/2020 04:09 PM        Healthy Eating     24-hour Dietary Recall:  Breakfast: small bowl of cereal (8 oz bowl) raisin bran, shredded wheat, corn flakes, cheerios w/ 4 oz Vit D or 2% milk or 1-2 breakfast sandwichs w/ meat/cheese/egg & hashbrowns  Lunch: sandwich w/ meat or plate from [employer] 2 meats, non-starchy veggies  Dinner: baked beans mixed w/ hot dog or rotisserie chicken w/ non-starchy veggies, 1 zucchini muffin   Snacks: pack of crackers, veggie straws, sweet potato chips, meat skins, fruit  Beverages: water, diet soda  Alcohol: none    Stage of change: Contemplation    - Eats out frequently, sometimes 1x/day. Being Active     Physical Activity Vital Sign:  How many days during the past week have you performed physical activity where your heart beats faster and your breathing is harder than normal for 30 minutes or more?  0 days    How many days in a typical week do you perform activity such as this?  0 days    Stage of change: Preparation    - Patient is currently staying in a hotel, so activity is limited. - Patient has a trike and rides this around sometimes, but is limited with inability to ride at night d/t safety.  - Patient stays active with running errands, doing house work, and acting as the caregiver for his wife.    Monitoring Do you monitor your blood sugar? Yes    How often do you monitor? 3-4    What are the range of readings?  mg/dL  Breakfast: 135-150 mg/dL (most mornings 117-135 mg/dL)  2-3 hrs after breakfast: 145-150  Lunch: 105-125 mg/dL, 1 reading of 202 mg/dL one day after eating sweets  Bedtime:  mg/dL    Do you know your last A1c measurement? Yes    Do you know the meaning of the A1c? Vaguely    Stage of change: Action    - Patient notes that BG has been high and low recently, although over the past week they have been fairly consistent with 2 low readings and 1 high reading. Taking Medications Medication Management:  Do you understand what your diabetes medications do? Yes    Can you afford your diabetes medications? Yes    How often do you miss doses of your diabetes medications? Ocassionally misses doses in between refills    Current dosing:   Key Antihyperglycemic Medications             glipiZIDE SR (GLUCOTROL XL) 5 mg CR tablet Take 1 Tab by mouth daily. Taking glipizide 5 mg twice daily    Blood Pressure Management:  Key ACE/ARB Medications             losartan (COZAAR) 50 mg tablet TAKE ONE TABLET BY MOUTH ONCE DAILY          Lipid Management:  Key Antihyperlipidemia Meds             atorvastatin (LIPITOR) 10 mg tablet Take 10 mg by mouth daily. Clot Prevention:  Key Anti-Platelet Anticoagulant Meds     The patient is on no antiplatelet meds or anticoagulants. Stage of change: Action     Healthy Coping Diabetes Skills, Confidence and Preparedness Index: Total score: 5.4  Skills: 5.8  Confidence: 4.0  Preparedness: 6.5    Stage of change: Preparation    - Patient lives with his wife in a hotel. - He assists with her care d/t her health issues, that include having 6 types of cancer.  - He struggles with stress management at times.    Reducing Risks Vaccines:  Influenza:   Immunization History   Administered Date(s) Administered    Influenza Vaccine 10/10/2016    Influenza Vaccine (Quad) PF 09/15/2017   Received in October 2019    Pneumococcal:   Immunization History   Administered Date(s) Administered    (RETIRED) Pneumococcal Vaccine (Unspecified Type) 02/06/2006    Pneumococcal Polysaccharide (PPSV-23) 03/13/2017        Hepatitis: Patient believes this was completed. Examinations:  Diabetic Foot and Eye Exam HM Status   Topic Date Due    Eye Exam  06/12/2019    Diabetic Foot Care  06/11/2021      Eye exam: 6/2018    Dental exam: Completed 7/2019    Foot exam: Completed 6/11/2020    Heart Protection:  BP Readings from Last 2 Encounters:   06/11/20 144/88   07/19/19 121/80        Lab Results   Component Value Date/Time    LDL, calculated 42 06/19/2020 09:41 AM        Kidney Protection:  Lab Results   Component Value Date/Time    Microalbumin/Creat ratio (mg/g creat) 273 (H) 06/24/2019 11:36 AM    Microalbumin,urine random 19.50 06/24/2019 11:36 AM       Patient-reported diabetes complications:  none    Patient has HTN, HLD, sleep apnea. Stage of change: Maintenance     Problem Solving Hypoglycemia Management:  What are signs and symptoms of hypoglycemia that you experience? Sleepiness, palpitations, weak, disoriented    How do you prevent hypoglycemia? Consistent meals/snack times and monitors BG consistently    How do you treat hypoglycemia? Rule of 15    Hyperglycemia Management:  What are signs and symptoms of hyperglycemia that you experience? Vague intuition     How can you prevent hyperglycemia? Take medication as instructed, Engage in regular physically active, Monitor blood glucose    Sick Day Management:  What do you do differently on sick days? Check blood glucose every 2-4 hours, Take diabetes medication as instructed by provider    Pattern Management:  Do you notice blood glucose patterns when you look at the readings in your meter or logbook? No    How do you use the blood glucose readings from your meter or logbook?  Understand how body responds to meals    Stage of change: Preparation   Note: Content derived from the American Association of Diabetes Educators' Diabetes Education Curriculum: A Guide to Successful Self-Management (2nd edition)      [address] [place] [address] Diabetes Health  Diabetes Self-Management Education   Education and Goal Plan for Session #1    AADE7 Self-Care Behaviors:  Behavior Education Completed (7/9/2020)   Healthy Eating   - Food sources of carbohydrates  - Meal size and portions  - Reading food labels  - Balanced plate  - Meal and snack timing  - Carbohydrate counting   - Importance of a variety of foods  - Importance of not skipping meals  - Patient is not always eating consistently every 4-5 hrs d/t running errands. Reviewed ways to plan ahead with snacks. - Patient will begin using the carb counting book to plan meals. Being Active   - Effects of exercise on blood glucose  - Physical activity and insulin  - Goals for exercise  - Types of exercise     Monitoring     - ADA blood glucose targets  - A1c interpretation  - Monitoring blood glucose trends per meter   Taking Medications - Medication schedule   Healthy Coping - Obtaining support   Reducing Risks Not addressed during this session. Problem Solving - Hypoglycemia signs/symptoms  - Hypoglycemia prevention  - Hypoglycemia treatment: Rule of 15  - Pattern management   Note: Content derived from the American Association of Diabetes Educators' Diabetes Education Curriculum: A Guide to Successful Self-Management (2nd edition)       Diabetes Educator Recommendations:     - Continue addressing diabetes self-care behaviors through education and support in AADE7 Curriculum individual sessions on reducing risks, healthy eating, being active, monitoring, taking medications, healthy coping and problem solving.     - Continue practicing knowledge and skills relating to healthy eating, monitoring, being active, medications, healthy coping and problem solving to improve diabetes self-management.      - Patient's Identified SMART Goal(s): - Increase physical activity by riding his trike for 1 hour on 3 days over the next 3 weeks. - Test BG 3-4x/day and record results, along with a food diary on 3-7 days over the next 3 weeks. -  Patient will work on counting carbs and limit meals to 45-60 grams with 1 snack per day of 15 grams.      - Pt to follow up with provider on the following: n/a         Diabetes Self-Management Education Follow-up Visit: 7/30/20 @ 1:00 pm.    [facility] [employer]:  Angela Keys is a 59 y.o. male being evaluated in person for 1:1 education due to COVID-19. --Alex Chandler RN on 7/9/2020 at 1:04 PM    An electronic signature was used to authenticate this note. I was in the office for the appointment and time spent: 90 minutes      ________________________________________________________________________________________________________________________________________________________________    Overall SCPI score: 5.4 Skills Score: 5.8  Low: Blood Sugar Monitoring(Q8) Confidence Score: 4.0  Low: Healthy Eating(Q1),Healthy Coping(Q2),Healthy Eating(Q4),Problem QFLDCDM(S4) Preparedness Score: 6.5  Low: Healthy Eating(Q1),Reducing Risks(Q4),Blood Sugar Monitoring(Q6)  Healthy Eating Score: 4.0  Low: Confidence(Q1),Confidence(Q4) Taking Medication Score: 6.0  Low: Skills(Q2) Blood Sugar Monitoring Score: 5.2  Low: AUXMWZ(L3) Reducing Risks Score: 6.5  Low: Skills(Q5),Preparedness(Q4)  Problem Solving Score: 5.3  Low: Confidence(Q7) Healthy Coping Score: 5.0  Low: Confidence(Q2) Being Active Score: 7.0  Low: Confidence(Q5),Preparedness(Q2)    Skills/Knowledge Questions  1. I know how to plan meals that have the best balance between carbohydrates, proteins and vegetables. 6  2. I know how my diabetes medications (pills, injectables and/or insulin) work in my body. 6  3. I know when to check my blood sugar if I want to see how my body responded to a meal. 6  4.  I know when to check my blood sugars to determine if my medication or insulin doses are correct. 6  5. I know what to do to prevent a low blood sugar when I exercise (either before, during, or after). 6  6. When I am sick, I know what to do differently with my diabetes management. 7  7. I know how stress can affect my diabetes management. 6  8. When I look at my blood sugars over a given week, I can explain what my blood sugar pattern is. 2  9. I know what my target levels are for A1c, blood pressure and cholesterol. 7  Confidence Questions  1. I am confident that I can plan balanced meals and snacks. 2  2. I am confident that I can manage my stress. 2  3. I am confident that I can prevent a low blood sugar during or after exercise. 7  4. I am confident that the next time I eat out, I will be able to choose foods that best keep my blood sugars in target. 2  5. I am confident I can include exercise into my schedule. 7  6. I am confident that I can use my daily blood sugars to adjust my diet, my activity, and/or my insulin. 6  7. When something out of my normal routine happens, I am confident that I can problem-solve and keep my diabetes on track. 2  Preparedness Questions  1. Within the next month, I will begin to eat more balanced meals and snacks. 6  2. Within the next month, I will choose an exercise activity and I will start fitting it into my schedule. 7  3. Within the next month, I will make a list of stress management options that work for me. 7  4. Within the next month, I will consistently plan ahead to prevent low blood sugars. 6  5. Within the next month, I will start adjusting my insulin doses on my own. 0  6. Within the next month, I will begin making changes to my diabetes management based on my daily blood sugars (eg - eating, activity and/or insulin). 6  7.  Within the next month, I will begin making changes to my diabetes management to meet my overall goals (eg - eating, activity and/or insulin).  7

## 2020-07-20 DIAGNOSIS — E11.8 CONTROLLED TYPE 2 DIABETES MELLITUS WITH COMPLICATION, WITHOUT LONG-TERM CURRENT USE OF INSULIN (HCC): ICD-10-CM

## 2020-07-20 NOTE — TELEPHONE ENCOUNTER
Last visit 06/11/2020 Virtual visit MD Lady Pena   Next appointment 3 months (09/2020)   Previous refill encounter(s) Glucotrol #180     Requested Prescriptions     Pending Prescriptions Disp Refills    glipiZIDE (GLUCOTROL) 5 mg tablet 180 Tab 0     Sig: Take 1 Tab by mouth two (2) times a day.

## 2020-07-21 RX ORDER — GLIPIZIDE 5 MG/1
5 TABLET ORAL 2 TIMES DAILY
Qty: 180 TAB | Refills: 0 | Status: CANCELLED | OUTPATIENT
Start: 2020-07-21

## 2020-07-21 RX ORDER — GLIPIZIDE 5 MG/1
5 TABLET, FILM COATED, EXTENDED RELEASE ORAL 2 TIMES DAILY
Qty: 180 TAB | Refills: 1 | Status: SHIPPED | OUTPATIENT
Start: 2020-07-21 | End: 2020-07-22 | Stop reason: CLARIF

## 2020-07-22 DIAGNOSIS — E11.8 CONTROLLED TYPE 2 DIABETES MELLITUS WITH COMPLICATION, WITHOUT LONG-TERM CURRENT USE OF INSULIN (HCC): ICD-10-CM

## 2020-07-22 RX ORDER — GLIPIZIDE 5 MG/1
5 TABLET ORAL 2 TIMES DAILY
Qty: 180 TAB | Refills: 1 | Status: SHIPPED | OUTPATIENT
Start: 2020-07-22 | End: 2021-01-21 | Stop reason: SDUPTHER

## 2020-07-22 RX ORDER — GLIPIZIDE 5 MG/1
5 TABLET, FILM COATED, EXTENDED RELEASE ORAL 2 TIMES DAILY
Qty: 180 TAB | Refills: 1 | Status: CANCELLED | OUTPATIENT
Start: 2020-07-22

## 2020-07-22 NOTE — TELEPHONE ENCOUNTER
[employer] sent fax requesting review of medication Glipizide XL 5 mg. Pharmacy states patient has been taking Glipizide IR 5 mg. Please review and contact [facility] [address]: [phone] or vis fax [phone]. Thank you. Last visit 06/11/2020 MD Velázquez Needs   Next appointment 3 months (09/2020)       Requested Prescriptions     Pending Prescriptions Disp Refills    glipiZIDE SR (GLUCOTROL XL) 5 mg CR tablet 180 Tab 1     Sig: Take 1 Tab by mouth two (2) times a day.

## 2020-07-30 ENCOUNTER — CLINICAL SUPPORT (OUTPATIENT)
Dept: DIABETES SERVICES | Age: 65
End: 2020-07-30

## 2020-07-30 DIAGNOSIS — E11.65 UNCONTROLLED TYPE 2 DIABETES MELLITUS WITH HYPERGLYCEMIA (HCC): Primary | ICD-10-CM

## 2020-07-30 NOTE — PROGRESS NOTES
[employer]  Diabetes Self-Management Education   Education and Goal Plan for Session #2    AADE7 Self-Care Behaviors:  Behavior Education Completed (7/30/2020)   Healthy Eating   Education delivered in previous session.  - Reviewed healthy snack options. - Patient has been eating 45-60 grams of CHO with meals about 50% of the time. Being Active   Education delivered in previous session.  - Patient remains active with caring for his wife and running errands. He has not returned to riding his trike due to the heat and other factors r/t finding a safe area. Monitoring     - ADA blood glucose targets  Education delivered in previous session.   - Reviewed past 3 weeks of BG readings via log. Fasting & pre-meal BG are consistently 80s-120s mg/dL w/ the exception of 2 nights in which BG was 244 & 255 mg/dL. Patient notes on both nights he ate more carbs than usual (rice and candy). The next morning after one of those nights BG was 207 mg/dL. He is also getting BG 67-78 about 2 days/week. - Post prandial BG are consistently < 170 mg/dL. Taking Medications - Medication review  - Medication schedule   Healthy Coping Not addressed during this session. Reducing Risks Not addressed during this session. Problem Solving - Pattern management   - Demonstrated ways to review and interpret BG log data.    Note: Content derived from the American Association of Diabetes Educators' Diabetes Education Curriculum: A Guide to Successful Self-Management (2nd edition)         Diabetes Educator Recommendations:     - Continue addressing diabetes self-care behaviors through education and support in AADE7 Curriculum individual sessions on reducing risks, healthy eating, being active, monitoring, taking medications, healthy coping and problem solving.     - Continue practicing knowledge and skills relating to healthy eating, monitoring, being active, medications and problem solving to improve diabetes self-management. - Patient's Identified SMART Goal(s): - Continue checking BG at least daily and keep a log over the next month. - Pt to follow up with provider on the following: Discuss hypoglycemia and glipizide dosing with Dr. Ju Dawn. Diabetes Self-Management Education Follow-up Visit: TBD. Plan to contact patient to schedule a 1 month follow up, per his request.    [facility] [employer]:    Aretha Mathews is a 72 y.o. male being evaluated 1:1 for education due to COVID-19 precautions. --Maria Del Rosario Perez, RN on 7/30/2020 at 2:25 PM    An electronic signature was used to authenticate this note.  I was in the office for the appointment and time spent: 60 minutes

## 2020-07-30 NOTE — Clinical Note
- Reviewed past 3 weeks of BG readings via log. Fasting & pre-meal BG are consistently 80s-120s mg/dL w/ the exception of 2 nights in which BG was 244 & 255 mg/dL. Patient notes on both nights he ate more carbs than usual (rice and candy). The next morning after one of those nights BG was 207 mg/dL. He is also getting BG 67-78 about 2 days/week. - Post prandial BG are consistently < 170 mg/dL. I asked him to reach out to you discuss these lower BG readings and to ask about glipizide dose. Thank you!   Carolann Hughes

## 2020-08-14 ENCOUNTER — TELEPHONE (OUTPATIENT)
Dept: DIABETES SERVICES | Age: 65
End: 2020-08-14

## 2020-08-14 NOTE — TELEPHONE ENCOUNTER
Patient requested a call to schedule follow up appointment for [facility]. Left detailed VMM encouraging him to call this RN or the main number to schedule appointment.

## 2020-09-01 NOTE — TELEPHONE ENCOUNTER
Left patient another detailed VMM requesting a return call to schedule 1 month follow up for [facility], per his request.

## 2020-12-10 ENCOUNTER — OFFICE VISIT (OUTPATIENT)
Dept: INTERNAL MEDICINE CLINIC | Age: 65
End: 2020-12-10
Payer: MEDICARE

## 2020-12-10 VITALS
RESPIRATION RATE: 16 BRPM | BODY MASS INDEX: 39.74 KG/M2 | WEIGHT: 247.25 LBS | HEIGHT: 66 IN | HEART RATE: 96 BPM | TEMPERATURE: 98.1 F | DIASTOLIC BLOOD PRESSURE: 72 MMHG | SYSTOLIC BLOOD PRESSURE: 134 MMHG | OXYGEN SATURATION: 97 %

## 2020-12-10 DIAGNOSIS — E11.65 UNCONTROLLED TYPE 2 DIABETES MELLITUS WITH HYPERGLYCEMIA (HCC): ICD-10-CM

## 2020-12-10 DIAGNOSIS — Z23 ENCOUNTER FOR IMMUNIZATION: ICD-10-CM

## 2020-12-10 DIAGNOSIS — E11.8 CONTROLLED TYPE 2 DIABETES MELLITUS WITH COMPLICATION, WITHOUT LONG-TERM CURRENT USE OF INSULIN (HCC): Primary | ICD-10-CM

## 2020-12-10 LAB — HBA1C MFR BLD HPLC: 7.1 %

## 2020-12-10 PROCEDURE — G8754 DIAS BP LESS 90: HCPCS | Performed by: INTERNAL MEDICINE

## 2020-12-10 PROCEDURE — 3051F HG A1C>EQUAL 7.0%<8.0%: CPT | Performed by: INTERNAL MEDICINE

## 2020-12-10 PROCEDURE — 3017F COLORECTAL CA SCREEN DOC REV: CPT | Performed by: INTERNAL MEDICINE

## 2020-12-10 PROCEDURE — G8752 SYS BP LESS 140: HCPCS | Performed by: INTERNAL MEDICINE

## 2020-12-10 PROCEDURE — 99214 OFFICE O/P EST MOD 30 MIN: CPT | Performed by: INTERNAL MEDICINE

## 2020-12-10 PROCEDURE — G8536 NO DOC ELDER MAL SCRN: HCPCS | Performed by: INTERNAL MEDICINE

## 2020-12-10 PROCEDURE — 83036 HEMOGLOBIN GLYCOSYLATED A1C: CPT | Performed by: INTERNAL MEDICINE

## 2020-12-10 PROCEDURE — 90694 VACC AIIV4 NO PRSRV 0.5ML IM: CPT | Performed by: INTERNAL MEDICINE

## 2020-12-10 PROCEDURE — G0463 HOSPITAL OUTPT CLINIC VISIT: HCPCS | Performed by: INTERNAL MEDICINE

## 2020-12-10 PROCEDURE — 2022F DILAT RTA XM EVC RTNOPTHY: CPT | Performed by: INTERNAL MEDICINE

## 2020-12-10 PROCEDURE — G8417 CALC BMI ABV UP PARAM F/U: HCPCS | Performed by: INTERNAL MEDICINE

## 2020-12-10 PROCEDURE — G8427 DOCREV CUR MEDS BY ELIG CLIN: HCPCS | Performed by: INTERNAL MEDICINE

## 2020-12-10 PROCEDURE — 3288F FALL RISK ASSESSMENT DOCD: CPT | Performed by: INTERNAL MEDICINE

## 2020-12-10 PROCEDURE — 1100F PTFALLS ASSESS-DOCD GE2>/YR: CPT | Performed by: INTERNAL MEDICINE

## 2020-12-10 PROCEDURE — G8510 SCR DEP NEG, NO PLAN REQD: HCPCS | Performed by: INTERNAL MEDICINE

## 2020-12-10 NOTE — PATIENT INSTRUCTIONS
Earwax Blockage: Care Instructions Your Care Instructions Earwax is a natural substance that protects the ear canal. Normally, earwax drains from the ears and does not cause problems. Sometimes earwax builds up and hardens. Earwax blockage (also called cerumen impaction) can cause some loss of hearing and pain. When wax is tightly packed, you will need to have your doctor remove it. Follow-up care is a key part of your treatment and safety. Be sure to make and go to all appointments, and call your doctor if you are having problems. It's also a good idea to know your test results and keep a list of the medicines you take. How can you care for yourself at home? · Do not try to remove earwax with cotton swabs, fingers, or other objects. This can make the blockage worse and damage the eardrum. · If your doctor recommends that you try to remove earwax at home: ? Soften and loosen the earwax with warm mineral oil. You also can try hydrogen peroxide mixed with an equal amount of room temperature water. Place 2 drops of the fluid, warmed to body temperature, in the ear two times a day for up to 5 days. ? Once the wax is loose and soft, all that is usually needed to remove it from the ear canal is a gentle, warm shower. Direct the water into the ear, then tip your head to let the earwax drain out. Dry your ear thoroughly with a hair dryer set on low. Hold the dryer several inches from your ear. ? If the warm mineral oil and shower do not work, use an over-the-counter wax softener. Read and follow all instructions on the label. After using the wax softener, use an ear syringe to gently flush the ear. Make sure the flushing solution is body temperature. Cool or hot fluids in the ear can cause dizziness. When should you call for help? Call your doctor now or seek immediate medical care if: 
  · Pus or blood drains from your ear.  
  · Your ears are ringing or feel full.  
  · You have a loss of hearing. Watch closely for changes in your health, and be sure to contact your doctor if: 
  · You have pain or reduced hearing after 1 week of home treatment.  
  · You have any new symptoms, such as nausea or balance problems. Where can you learn more? Go to http://www.gray.com/ Enter T051 in the search box to learn more about \"Earwax Blockage: Care Instructions. \" Current as of: June 26, 2019               Content Version: 12.6 © 1284-7916 [employer]. Care instructions adapted under license by [employer] (which disclaims liability or warranty for this information). If you have questions about a medical condition or this instruction, always ask your healthcare professional. [address] any warranty or liability for your use of this information.

## 2020-12-10 NOTE — PROGRESS NOTES
RM 1    Chief Complaint   Patient presents with    Medication Evaluation     Medication check    Knee Pain     Knee pain in both knees     Patient reports general knee pain due to arthritis in both knees. 1. Have you been to the ER, urgent care clinic since your last visit? Hospitalized since your last visit? No    2. Have you seen or consulted any other health care providers outside of the [address] [place] [address] since your last visit? Include any pap smears or colon screening. No    Health Maintenance Due   Topic Date Due    Shingrix Vaccine Age 49> (1 of 2) 07/24/2005    Eye Exam Retinal or Dilated  06/12/2019    Medicare Yearly Exam  07/08/2020    GLAUCOMA SCREENING Q2Y  07/24/2020    Flu Vaccine (1) 09/01/2020       Learning Assessment 6/10/2020   PRIMARY LEARNER Patient   PRIMARY LANGUAGE ENGLISH   LEARNER PREFERENCE PRIMARY DEMONSTRATION   ANSWERED BY patient   RELATIONSHIP SELF       After obtaining consent, and per orders of Dr. Sharath Malloy, injection of Flu vaccine given by [facility]. Patient instructed to remain in clinic for 20 minutes afterwards, and to report any adverse reaction to me immediately. Patient tolerated well. AVS  education, follow up, and recommendations provided and addressed with patient.   services used to advise patient yes/no

## 2020-12-10 NOTE — PROGRESS NOTES
JENNYFER Byrne is a 72 y.o. male, he presents today for:    Feels like diabetes sugars are more steady. Averaging between 117-195. Lowest 78, highest 240. Measuring 2-4 hours after breakfast, and sometimes before for fasting. Fasting reading often 117-129        PMH/PSH: reviewed and updated  Sochx:  reports that he has never smoked. He has never used smokeless tobacco. He reports that he does not drink alcohol or use drugs. Famhx: reviewed and updated     All: Allergies   Allergen Reactions    Metformin Rash     Bumps on upper extremities and itching.  Sulfa (Sulfonamide Antibiotics) Itching    Voltaren [Diclofenac Sodium] Swelling     Med:   Current Outpatient Medications   Medication Sig    glipiZIDE (GLUCOTROL) 5 mg tablet Take 1 Tab by mouth two (2) times a day.  potassium chloride SR (K-TAB) 20 mEq tablet Take 1 Tab by mouth daily.  hydroCHLOROthiazide (HYDRODIURIL) 25 mg tablet Take 1 Tab by mouth daily.  losartan (COZAAR) 50 mg tablet TAKE ONE TABLET BY MOUTH ONCE DAILY    amLODIPine (NORVASC) 5 mg tablet Take 1 Tab by mouth daily.  atorvastatin (LIPITOR) 10 mg tablet Take 10 mg by mouth daily.  cetirizine (ZYRTEC) 10 mg tablet Take 10 mg by mouth.  pramoxine (Proctofoam) 1 % topical foam Apply  to affected area three (3) times daily as needed for Hemorrhoids.  cholecalciferol, vitamin D3, (VITAMIN D3) 2,000 unit tab Take  by mouth daily.  mometasone-formoterol (DULERA) 100-5 mcg/actuation HFA inhaler Take 2 Puffs by inhalation two (2) times a day.  albuterol (PROVENTIL HFA) 90 mcg/actuation inhaler Take 2 Puffs by inhalation every four (4) hours as needed.  docusate sodium (STOOL SOFTENER) 100 mg capsule Take 200 mg by mouth daily.  dronedarone (MULTAQ) Tab tablet Take 1 Tab by mouth two (2) times daily (with meals).  pyridoxine (VITAMIN B-6) 100 mg tablet Take 100 mg by mouth daily. No current facility-administered medications for this visit. Review of Systems   Constitutional: Negative for chills, fever and malaise/fatigue. Respiratory: Negative for shortness of breath. Cardiovascular: Negative for chest pain. PE:  Blood pressure 134/72, pulse 96, temperature 98.1 °F (36.7 °C), resp. rate 16, height 5' 6\" (1.676 m), weight 247 lb 4 oz (112.2 kg), SpO2 97 %. Body mass index is 39.91 kg/m². Physical Exam  Vitals signs and nursing note reviewed. Constitutional:       General: He is not in acute distress. Appearance: Normal appearance. He is well-developed and normal weight. HENT:      Head: Normocephalic and atraumatic. Right Ear: Tympanic membrane normal.      Left Ear: Tympanic membrane normal.      Nose: Nose normal.      Mouth/Throat:      Mouth: Mucous membranes are moist.   Eyes:      Extraocular Movements: Extraocular movements intact. Conjunctiva/sclera: Conjunctivae normal.      Pupils: Pupils are equal, round, and reactive to light. Neck:      Musculoskeletal: Normal range of motion and neck supple. Cardiovascular:      Rate and Rhythm: Normal rate and regular rhythm. Pulses: Normal pulses. Heart sounds: Normal heart sounds. Pulmonary:      Effort: Pulmonary effort is normal.      Breath sounds: Normal breath sounds. Abdominal:      General: Abdomen is flat. Bowel sounds are normal.      Comments: Central adiposity   Musculoskeletal: Normal range of motion. Right lower leg: Edema present. Left lower leg: Edema present. Skin:     General: Skin is warm. Capillary Refill: Capillary refill takes less than 2 seconds. Neurological:      General: No focal deficit present. Mental Status: He is alert and oriented to person, place, and time.    Psychiatric:         Mood and Affect: Mood normal.         Labs:   Results for orders placed or performed in visit on 12/10/20   AMB POC HEMOGLOBIN A1C   Result Value Ref Range    Hemoglobin A1c (POC) 7.1 %     Diabetes Data Review: Diabetic Foot and Eye Exam HM Status   Topic Date Due    Eye Exam  06/12/2019    Diabetic Foot Care  06/11/2021     Lab Results   Component Value Date/Time    Microalbumin/Creat ratio (mg/g creat) 273 (H) 06/24/2019 11:36 AM    Microalbumin,urine random 19.50 06/24/2019 11:36 AM     Lab Results   Component Value Date/Time    Hemoglobin A1c 7.7 (H) 09/24/2019 11:40 AM    Hemoglobin A1c (POC) 7.1 12/10/2020 03:20 PM     Lab Results   Component Value Date/Time    LDL, calculated 42 06/19/2020 09:41 AM       A/P:  72 y.o. male    ICD-10-CM ICD-9-CM    1. Controlled type 2 diabetes mellitus with complication, without long-term current use of insulin ([facility])  E11.8 250.90 AMB POC HEMOGLOBIN A1C   2. Encounter for immunization  Z23 V03.89 FLU (FLUAD QUAD INFLUENZA VACCINE,QUAD,ADJUVANTED)   3. Uncontrolled type 2 diabetes mellitus with hyperglycemia ([facility])  E30.84 219.66 METABOLIC PANEL, COMPREHENSIVE      FERRITIN      FERRITIN      METABOLIC PANEL, COMPREHENSIVE     Diabetes: remains above goal control range of < 7.0. On glipizide 2 times daily. States he cannot take metformin due to gynecomastia changes (system lists rash). - continue daily ASA, statin   - continue glipizide.      Cardiac: follows with Dr. Sita Martinez, last visit on record 12/2019  - CAD, dilated LV, EF 65% on 2011 cath. - Paroxysmal Atrial fib, \"maintaining SR or Multaq\" not on anticoagulation.   - HTN: numbers at home are better than in office. 130s and lowest 121.   -continue current medications, consider increasing and stopping losartan if home number remain high.      History of prostate cancer: radiation proctitis noted on 7/2019 colonoscopy. Follows with Dr. Christofer Green     asthma: on Delia Perez takes this daily. (still has a lot at home) agrees to restart taking daily. ROBERT: on CPAP: reports continued use.  Sleep medicine phsyician is currently Dr. Natty Baker     Obesity: severe, patient is currently working on adding back in exercise.     GERD: taking apple cider vinegar 5 ml before meal. And finds this is controllingsymptoms.     - He was given AVS and expressed understanding with the diagnosis and plan as discussed. No future appointments.

## 2020-12-11 LAB
ALBUMIN SERPL-MCNC: 3.6 G/DL (ref 3.5–5)
ALBUMIN/GLOB SERPL: 0.9 {RATIO} (ref 1.1–2.2)
ALP SERPL-CCNC: 94 U/L (ref 45–117)
ALT SERPL-CCNC: 28 U/L (ref 12–78)
ANION GAP SERPL CALC-SCNC: 11 MMOL/L (ref 5–15)
AST SERPL-CCNC: 14 U/L (ref 15–37)
BILIRUB SERPL-MCNC: 0.7 MG/DL (ref 0.2–1)
BUN SERPL-MCNC: 17 MG/DL (ref 6–20)
BUN/CREAT SERPL: 13 (ref 12–20)
CALCIUM SERPL-MCNC: 8.8 MG/DL (ref 8.5–10.1)
CHLORIDE SERPL-SCNC: 105 MMOL/L (ref 97–108)
CO2 SERPL-SCNC: 26 MMOL/L (ref 21–32)
CREAT SERPL-MCNC: 1.31 MG/DL (ref 0.7–1.3)
FERRITIN SERPL-MCNC: 196 NG/ML (ref 26–388)
GLOBULIN SER CALC-MCNC: 3.9 G/DL (ref 2–4)
GLUCOSE SERPL-MCNC: 132 MG/DL (ref 65–100)
POTASSIUM SERPL-SCNC: 3.4 MMOL/L (ref 3.5–5.1)
PROT SERPL-MCNC: 7.5 G/DL (ref 6.4–8.2)
SODIUM SERPL-SCNC: 142 MMOL/L (ref 136–145)

## 2020-12-11 NOTE — PROGRESS NOTES
Stable labs, no sign of iron deficiency, normal range kidney function. No new changes in medication at this time.

## 2021-01-20 DIAGNOSIS — E11.8 CONTROLLED TYPE 2 DIABETES MELLITUS WITH COMPLICATION, WITHOUT LONG-TERM CURRENT USE OF INSULIN (HCC): ICD-10-CM

## 2021-01-20 DIAGNOSIS — I10 ESSENTIAL HYPERTENSION: ICD-10-CM

## 2021-01-21 RX ORDER — HYDROCHLOROTHIAZIDE 25 MG/1
25 TABLET ORAL DAILY
Qty: 90 TAB | Refills: 1 | Status: SHIPPED | OUTPATIENT
Start: 2021-01-21 | End: 2021-09-03

## 2021-01-21 RX ORDER — GLIPIZIDE 5 MG/1
5 TABLET ORAL 2 TIMES DAILY
Qty: 180 TAB | Refills: 1 | Status: SHIPPED | OUTPATIENT
Start: 2021-01-21 | End: 2021-07-15

## 2021-01-21 RX ORDER — POTASSIUM CHLORIDE 1500 MG/1
TABLET, FILM COATED, EXTENDED RELEASE ORAL
Qty: 90 TAB | Refills: 1 | Status: SHIPPED | OUTPATIENT
Start: 2021-01-21 | End: 2021-07-02

## 2021-01-21 RX ORDER — ATORVASTATIN CALCIUM 10 MG/1
10 TABLET, FILM COATED ORAL DAILY
Qty: 90 TAB | Refills: 4 | Status: SHIPPED | OUTPATIENT
Start: 2021-01-21 | End: 2022-01-17 | Stop reason: SDUPTHER

## 2021-01-21 RX ORDER — LOSARTAN POTASSIUM 50 MG/1
TABLET ORAL
Qty: 90 TAB | Refills: 1 | Status: SHIPPED | OUTPATIENT
Start: 2021-01-21 | End: 2021-08-26

## 2021-01-21 RX ORDER — AMLODIPINE BESYLATE 5 MG/1
5 TABLET ORAL DAILY
Qty: 90 TAB | Refills: 1 | Status: SHIPPED | OUTPATIENT
Start: 2021-01-21 | End: 2021-08-17 | Stop reason: SDUPTHER

## 2021-01-22 ENCOUNTER — TELEPHONE (OUTPATIENT)
Dept: INTERNAL MEDICINE CLINIC | Age: 66
End: 2021-01-22

## 2021-01-23 NOTE — TELEPHONE ENCOUNTER
On-Call Note (late entry):    Paged for \"Issues with his c-pap machine\". Notes:   He cannot get CPAP machine to turn off--button is not working. Paged to pt's wife's listed number but no answer after 4 calls. Found alternate number and reached pt on his listed number. Reviewed if he is having problems with his CPAP, he would need to contact number of home health/clypd company that supports machine--number usually on sticker on machine. He notes that Dr. Alia Chester does not manage his CPAP or sleep apnea. Recommended if needs new CPAP or to address further, should contact sleep medicine provider. Pt voiced understanding of plan.

## 2021-01-26 ENCOUNTER — OFFICE VISIT (OUTPATIENT)
Dept: SLEEP MEDICINE | Age: 66
End: 2021-01-26
Payer: MEDICARE

## 2021-01-26 VITALS
HEART RATE: 88 BPM | OXYGEN SATURATION: 96 % | SYSTOLIC BLOOD PRESSURE: 143 MMHG | WEIGHT: 243 LBS | TEMPERATURE: 99.1 F | HEIGHT: 66 IN | BODY MASS INDEX: 39.05 KG/M2 | DIASTOLIC BLOOD PRESSURE: 90 MMHG

## 2021-01-26 DIAGNOSIS — I48.91 ATRIAL FIBRILLATION, UNSPECIFIED TYPE (HCC): ICD-10-CM

## 2021-01-26 DIAGNOSIS — G47.33 OSA (OBSTRUCTIVE SLEEP APNEA): Primary | ICD-10-CM

## 2021-01-26 DIAGNOSIS — E66.9 OBESITY (BMI 30-39.9): ICD-10-CM

## 2021-01-26 PROCEDURE — 3017F COLORECTAL CA SCREEN DOC REV: CPT | Performed by: SPECIALIST

## 2021-01-26 PROCEDURE — G8536 NO DOC ELDER MAL SCRN: HCPCS | Performed by: SPECIALIST

## 2021-01-26 PROCEDURE — G8753 SYS BP > OR = 140: HCPCS | Performed by: SPECIALIST

## 2021-01-26 PROCEDURE — G8427 DOCREV CUR MEDS BY ELIG CLIN: HCPCS | Performed by: SPECIALIST

## 2021-01-26 PROCEDURE — G8432 DEP SCR NOT DOC, RNG: HCPCS | Performed by: SPECIALIST

## 2021-01-26 PROCEDURE — G8417 CALC BMI ABV UP PARAM F/U: HCPCS | Performed by: SPECIALIST

## 2021-01-26 PROCEDURE — G8755 DIAS BP > OR = 90: HCPCS | Performed by: SPECIALIST

## 2021-01-26 PROCEDURE — 1100F PTFALLS ASSESS-DOCD GE2>/YR: CPT | Performed by: SPECIALIST

## 2021-01-26 PROCEDURE — 3288F FALL RISK ASSESSMENT DOCD: CPT | Performed by: SPECIALIST

## 2021-01-26 PROCEDURE — 99204 OFFICE O/P NEW MOD 45 MIN: CPT | Performed by: SPECIALIST

## 2021-01-26 NOTE — PROGRESS NOTES
[address] [facility] [place]. [place], [address]  Tel.  [phone]  Fax. [address] [facility]  [place], [address] [facility]  Tel.  [phone]  Fax. [phone] [address]   Tel.  [phone]  Fax. [phone]       Chief Complaint       No chief complaint on file. HPI      Mo Joyner is 72 y.o. male seen for evaluation of a sleep disorder. He underwent an evaluation at sleep disorders [place] [address] in 2007. He was diagnosed with sleep apnea and started on CPAP at that time. He states that he was seen once in follow-up. He has continued on the same unit. Download data is not available. Patient only retires at 1 AM and awakens at 10 AM.  May awaken several times during the night. Notes history of snoring, vivid dreaming/nightmares. He denies sleep talking or sleepwalking, bruxism or nocturnal incontinence, abnormal arm or leg movements, hypnagogic hallucinations, sleep paralysis or cataplexy. Allergies   Allergen Reactions    Metformin Rash     Bumps on upper extremities and itching.  Sulfa (Sulfonamide Antibiotics) Itching    Voltaren [Diclofenac Sodium] Swelling       Current Outpatient Medications   Medication Sig Dispense Refill    potassium chloride SR (K-TAB) 20 mEq tablet TAKE ONE TABLET BY MOUTH DAILY 90 Tab 1    hydroCHLOROthiazide (HYDRODIURIL) 25 mg tablet Take 1 Tab by mouth daily. 90 Tab 1    amLODIPine (NORVASC) 5 mg tablet Take 1 Tab by mouth daily. 90 Tab 1    glipiZIDE (GLUCOTROL) 5 mg tablet Take 1 Tab by mouth two (2) times a day. 180 Tab 1    atorvastatin (LIPITOR) 10 mg tablet Take 1 Tab by mouth daily. 90 Tab 4    losartan (COZAAR) 50 mg tablet TAKE ONE TABLET BY MOUTH ONCE DAILY 90 Tab 1    cetirizine (ZYRTEC) 10 mg tablet Take 10 mg by mouth.  pramoxine (Proctofoam) 1 % topical foam Apply  to affected area three (3) times daily as needed for Hemorrhoids.       cholecalciferol, vitamin D3, (VITAMIN D3) 2,000 unit tab Take  by mouth daily.  mometasone-formoterol (DULERA) 100-5 mcg/actuation HFA inhaler Take 2 Puffs by inhalation two (2) times a day. 1 Inhaler 3    albuterol (PROVENTIL HFA) 90 mcg/actuation inhaler Take 2 Puffs by inhalation every four (4) hours as needed.  docusate sodium (STOOL SOFTENER) 100 mg capsule Take 200 mg by mouth daily.  dronedarone (MULTAQ) Tab tablet Take 1 Tab by mouth two (2) times daily (with meals). 60 Tab 12    pyridoxine (VITAMIN B-6) 100 mg tablet Take 100 mg by mouth daily. He  has a past medical history of Asthma, Atrial fibrillation ([facility] CAD (coronary artery disease), Cancer ([facility] Diabetes ([facility] ED (erectile dysfunction), Enlarged heart, Hypertension, and Sleep apnea. He  has a past surgical history that includes pr anesth,knee area surgery; cardiac catheterization; hx other surgical; hx orthopaedic; and colonoscopy (N/A, 7/19/2019). He family history includes Alcohol abuse in his mother; Diabetes in his father, mother, and sister; Heart Disease in his father and mother; Hypertension in his father and mother; Stroke in his brother and sister. He  reports that he has never smoked. He has never used smokeless tobacco. He reports that he does not drink alcohol or use drugs. Review of Systems:  Review of Systems   Constitutional: Negative for chills and fever. HENT: Negative for hearing loss and tinnitus. Eyes: Negative for blurred vision and double vision. Respiratory: Positive for cough. Cardiovascular: Negative for chest pain and palpitations. Gastrointestinal: Positive for heartburn. Negative for abdominal pain. Genitourinary: Negative for frequency and urgency. Musculoskeletal: Positive for back pain and neck pain. Skin: Negative for rash. Neurological: Positive for headaches. Psychiatric/Behavioral: Negative for depression. The patient is not nervous/anxious. Objective:     Visit Vitals  BP (!) 143/90   Pulse 88   Temp 99.1 °F (37.3 °C) (Temporal)   Ht 5' 6\" (1.676 m)   Wt 243 lb (110.2 kg)   SpO2 96%   BMI 39.22 kg/m²     Body mass index is 39.22 kg/m². General:   Conversant, cooperative   Eyes:  Pupils equal and reactive,    Oropharynx:   Mallampati score IV,  tongue mildlyscalloped, uvula prominent, narrow posterior airway       Neck:   No carotid bruits; Chest/Lungs:  Clear on auscultation    CVS:  Normal rate, regular rhythm, 1+ distal edema   Skin:  Warm to touch; no obvious rashes   Neuro:  Speech fluent, face symmetrical, tongue movement normal   Psych:  Normal affect,  normal countenance        Assessment:       ICD-10-CM ICD-9-CM    1. ROBERT (obstructive sleep apnea)  G47.33 327.23    2. Obesity (BMI 30-39. 9)  E66.9 278.00    3. Atrial fibrillation, unspecified type ([facility]  I48.91 427.31      History of sleep disordered breathing. Compliance data, initial evaluation not available. He will be reevaluated with a sleep study; split per criteria. Results to be reviewed with him. Plan:     No orders of the defined types were placed in this encounter. * Patient has a history and examination consistent with the diagnosis of sleep apnea. * Sleep testing was ordered for reevaluation. * He was provided information on sleep apnea including corresponding risk factors and the importance of proper treatment. * Treatment options if indicated were reviewed today. Instructions:    o The patient would benefit from weight reduction measures. o Do not engage in activities requiring a normal degree of alertness if fatigue is present.   o The patient understands that untreated or undertreated sleep apnea could impair judgement and the ability to function normally during the day.  o Call or return if symptoms worsen or persist.          Boogie Villa MD, FAA  Electronically signed 01/26/21       This note was created using voice recognition software. Despite editing, there may be syntax errors. This note will not be viewable in [address]

## 2021-01-26 NOTE — PATIENT INSTRUCTIONS

## 2021-02-01 ENCOUNTER — OFFICE VISIT (OUTPATIENT)
Dept: SLEEP MEDICINE | Age: 66
End: 2021-02-01

## 2021-02-01 DIAGNOSIS — G47.33 OSA (OBSTRUCTIVE SLEEP APNEA): Primary | ICD-10-CM

## 2021-02-03 LAB — SARS-COV-2, NAA: NOT DETECTED

## 2021-02-05 ENCOUNTER — HOSPITAL ENCOUNTER (OUTPATIENT)
Dept: SLEEP MEDICINE | Age: 66
Discharge: HOME | End: 2021-02-05
Payer: MEDICARE

## 2021-02-05 VITALS
OXYGEN SATURATION: 93 % | WEIGHT: 239 LBS | BODY MASS INDEX: 38.41 KG/M2 | DIASTOLIC BLOOD PRESSURE: 92 MMHG | TEMPERATURE: 97.3 F | HEART RATE: 93 BPM | SYSTOLIC BLOOD PRESSURE: 154 MMHG | HEIGHT: 66 IN

## 2021-02-05 DIAGNOSIS — G47.33 OSA (OBSTRUCTIVE SLEEP APNEA): ICD-10-CM

## 2021-02-05 PROCEDURE — 95811 POLYSOM 6/>YRS CPAP 4/> PARM: CPT | Performed by: SPECIALIST

## 2021-02-05 PROCEDURE — 95811 POLYSOM 6/>YRS CPAP 4/> PARM: CPT

## 2021-02-06 ENCOUNTER — DOCUMENTATION ONLY (OUTPATIENT)
Dept: SLEEP MEDICINE | Age: 66
End: 2021-02-06

## 2021-02-06 NOTE — PROGRESS NOTES
[address] [facility] [place]. [place], [address]  Tel.  [phone]  Fax. [address] [facility] [address]  [place], [address] [facility]  Tel.  [phone]  Fax. [phone] [address] [facility] [address]  Tel.  [phone]  Fax. [phone]     Sleep Study Technical Notes        PRE-Test:   Maria M Olsen (: 1955) arrived in the lobby. Patient was greeted, temperature checked (97.3 °) and screening questions asked. The patient was taken to the Sleep Center and taken directly to his/her room. BP (154/92) and SaO2 (99) were taken. Scale currently not available. Procedure explained to the patient and questions were answered. The patient expressed understanding of the procedure. Electrodes were applied without incident. The patient was placed in bed and the study was started. Acquisition Notes:   Lights off: 11:11pm     Respiratory events: Apnea / Hypopnea   ECG:  NSR   PAP titration: Bi-pap   Other comments: Patient slept supine. Sleep stages 1, 2 and rem noted. Cpap initiated 7 cm via Large F&P Vitera. Cpap increased as needed for Apnea, Hypopnea and Snore. Switch to Bi-pap as A/H snore continue. . Final Bi-pap pressure /17. Average HR 73.4 BPM. Min SAO2 81%.  Desensitization Mask(s) Used: Large F&P Vitera. POST Test:   Patient was awakened. Electrodes were removed. The patient was discharged after answering the Post Questionnaire. Patient stated that he/she was alert and ok to drive.  Equipment and room cleaned per infection control policy.

## 2021-02-15 ENCOUNTER — TELEPHONE (OUTPATIENT)
Dept: SLEEP MEDICINE | Age: 66
End: 2021-02-15

## 2021-02-15 NOTE — TELEPHONE ENCOUNTER
Patient LVM today requesting for results of sleep study and next step, returned patient call and informed patient of 10-12 business day turnaround.  Patient would like to know results as soon as possible

## 2021-02-28 ENCOUNTER — TELEPHONE (OUTPATIENT)
Dept: SLEEP MEDICINE | Age: 66
End: 2021-02-28

## 2021-02-28 DIAGNOSIS — G47.33 OSA (OBSTRUCTIVE SLEEP APNEA): Primary | ICD-10-CM

## 2021-02-28 NOTE — TELEPHONE ENCOUNTER
Nocturnal polysomnogram was performed for reevaluation of sleep apnea. During the initial portion of the study: 152 minutes were recorded of which 95 minutes spent asleep with a sleep efficiency of 62.8%. Sleep onset at 9 minutes; neither REM nor N3 sleep were observed. 164 events occurred of which 73 were hypopnea and 91 apnea (6 central, 85 obstructive). Minimal SaO2 82%. Severe snoring noted. Overall /h. Patient was supine. During the second portion of the study CPAP and BiPAP were employed. 283.5 minutes were recorded of which 183.5 minutes spent asleep with a sleep efficiency of 64.7%. Sleep onset at 41.5 minutes; REM onset at 89 minutes with total REM representing 22.9% of sleep time. 119 respiratory events occurred of which 33 were hypopnea and 86 apnea (54 central, 32 obstructive). The overall AHI was 38.9/h; the overall REM related AHI of 61.4/h. Minimal SaO2 86%. CPAP initiated at 7 cm increased to 15 cm. Respiratory events continued at that pressure setting. BiPAP was then initiated and increased to 22/17 cm. At BiPAP of 22/ 16  cm: 32.5 minutes were recorded of which 32 minutes spent asleep and 7 minutes in rem. Corresponding AHI of 3.8/h. Impression: Severe sleep disordered breathing responding to BiPAP of 22/16 cm. Study potentially underestimated sleep apnea severity as, during initial portion of the recording, REM sleep was not observed. Sleep technologist: Please review the study results with the patient. Order has been entered for BiPAP 22/16 cm. Please schedule first compliance appointment.

## 2021-03-01 ENCOUNTER — TELEPHONE (OUTPATIENT)
Dept: SLEEP MEDICINE | Age: 66
End: 2021-03-01

## 2021-03-08 ENCOUNTER — DOCUMENTATION ONLY (OUTPATIENT)
Dept: SLEEP MEDICINE | Age: 66
End: 2021-03-08

## 2021-03-08 NOTE — TELEPHONE ENCOUNTER
Reviewed sleep study results with patient. He expressed understanding and is willing to proceed with a trial of BiPAP.

## 2021-03-30 ENCOUNTER — TELEPHONE (OUTPATIENT)
Dept: SLEEP MEDICINE | Age: 66
End: 2021-03-30

## 2021-03-30 NOTE — TELEPHONE ENCOUNTER
Mr. Charmaine Hampton called and stated that he is hearing load noises coming from his mask nightly once his BiPap machine reaches it prescribed pressures. Technician reviewed patient's download and download review showed patient with a consistent high leak. Patient was informed to contact [facility] to address his mask issue, patient stated that he was waiting for a return call from [facility]. He is to call if he requires any further assistance.

## 2021-07-02 DIAGNOSIS — I10 ESSENTIAL HYPERTENSION: ICD-10-CM

## 2021-07-02 RX ORDER — POTASSIUM CHLORIDE 1500 MG/1
TABLET, FILM COATED, EXTENDED RELEASE ORAL
Qty: 30 TABLET | Refills: 0 | Status: SHIPPED | OUTPATIENT
Start: 2021-07-02 | End: 2021-07-21

## 2021-07-15 DIAGNOSIS — I10 ESSENTIAL HYPERTENSION: ICD-10-CM

## 2021-07-15 DIAGNOSIS — E11.8 CONTROLLED TYPE 2 DIABETES MELLITUS WITH COMPLICATION, WITHOUT LONG-TERM CURRENT USE OF INSULIN (HCC): ICD-10-CM

## 2021-07-15 RX ORDER — GLIPIZIDE 5 MG/1
TABLET ORAL
Qty: 180 TABLET | Refills: 0 | Status: SHIPPED | OUTPATIENT
Start: 2021-07-15 | End: 2022-08-17 | Stop reason: SDUPTHER

## 2021-07-21 RX ORDER — POTASSIUM CHLORIDE 1500 MG/1
TABLET, FILM COATED, EXTENDED RELEASE ORAL
Qty: 30 TABLET | Refills: 0 | Status: SHIPPED | OUTPATIENT
Start: 2021-07-21 | End: 2021-08-19 | Stop reason: SDUPTHER

## 2021-08-17 ENCOUNTER — OFFICE VISIT (OUTPATIENT)
Dept: INTERNAL MEDICINE CLINIC | Age: 66
End: 2021-08-17
Payer: MEDICARE

## 2021-08-17 VITALS
RESPIRATION RATE: 18 BRPM | BODY MASS INDEX: 38.73 KG/M2 | DIASTOLIC BLOOD PRESSURE: 83 MMHG | TEMPERATURE: 98.4 F | SYSTOLIC BLOOD PRESSURE: 157 MMHG | HEART RATE: 85 BPM | HEIGHT: 66 IN | OXYGEN SATURATION: 98 % | WEIGHT: 241 LBS

## 2021-08-17 DIAGNOSIS — E66.01 SEVERE OBESITY (BMI 35.0-35.9 WITH COMORBIDITY) (HCC): ICD-10-CM

## 2021-08-17 DIAGNOSIS — I10 ESSENTIAL HYPERTENSION: ICD-10-CM

## 2021-08-17 DIAGNOSIS — E11.65 UNCONTROLLED TYPE 2 DIABETES MELLITUS WITH HYPERGLYCEMIA (HCC): Primary | ICD-10-CM

## 2021-08-17 DIAGNOSIS — Z85.46 HISTORY OF PROSTATE CANCER: ICD-10-CM

## 2021-08-17 LAB
ALBUMIN SERPL-MCNC: 3.7 G/DL (ref 3.5–5)
ALBUMIN UR QL STRIP: 80 MG/L
ALBUMIN/GLOB SERPL: 1 {RATIO} (ref 1.1–2.2)
ALP SERPL-CCNC: 98 U/L (ref 45–117)
ALT SERPL-CCNC: 50 U/L (ref 12–78)
ANION GAP SERPL CALC-SCNC: 7 MMOL/L (ref 5–15)
AST SERPL-CCNC: 29 U/L (ref 15–37)
BILIRUB SERPL-MCNC: 0.8 MG/DL (ref 0.2–1)
BUN SERPL-MCNC: 19 MG/DL (ref 6–20)
BUN/CREAT SERPL: 13 (ref 12–20)
CALCIUM SERPL-MCNC: 8.8 MG/DL (ref 8.5–10.1)
CHLORIDE SERPL-SCNC: 105 MMOL/L (ref 97–108)
CHOLEST SERPL-MCNC: 147 MG/DL
CO2 SERPL-SCNC: 28 MMOL/L (ref 21–32)
CREAT SERPL-MCNC: 1.44 MG/DL (ref 0.7–1.3)
CREATININE, URINE POC: 100 MG/DL
GLOBULIN SER CALC-MCNC: 3.8 G/DL (ref 2–4)
GLUCOSE SERPL-MCNC: 214 MG/DL (ref 65–100)
HBA1C MFR BLD HPLC: 7.6 %
HDLC SERPL-MCNC: 58 MG/DL
HDLC SERPL: 2.5 {RATIO} (ref 0–5)
LDLC SERPL CALC-MCNC: 67.6 MG/DL (ref 0–100)
MICROALBUMIN/CREAT RATIO POC: ABNORMAL MG/G
POTASSIUM SERPL-SCNC: 2.9 MMOL/L (ref 3.5–5.1)
PROT SERPL-MCNC: 7.5 G/DL (ref 6.4–8.2)
SODIUM SERPL-SCNC: 140 MMOL/L (ref 136–145)
TRIGL SERPL-MCNC: 107 MG/DL (ref ?–150)
VLDLC SERPL CALC-MCNC: 21.4 MG/DL

## 2021-08-17 PROCEDURE — G8536 NO DOC ELDER MAL SCRN: HCPCS | Performed by: INTERNAL MEDICINE

## 2021-08-17 PROCEDURE — 82044 UR ALBUMIN SEMIQUANTITATIVE: CPT | Performed by: INTERNAL MEDICINE

## 2021-08-17 PROCEDURE — 3051F HG A1C>EQUAL 7.0%<8.0%: CPT | Performed by: INTERNAL MEDICINE

## 2021-08-17 PROCEDURE — G8754 DIAS BP LESS 90: HCPCS | Performed by: INTERNAL MEDICINE

## 2021-08-17 PROCEDURE — 1100F PTFALLS ASSESS-DOCD GE2>/YR: CPT | Performed by: INTERNAL MEDICINE

## 2021-08-17 PROCEDURE — G8427 DOCREV CUR MEDS BY ELIG CLIN: HCPCS | Performed by: INTERNAL MEDICINE

## 2021-08-17 PROCEDURE — G8417 CALC BMI ABV UP PARAM F/U: HCPCS | Performed by: INTERNAL MEDICINE

## 2021-08-17 PROCEDURE — G8753 SYS BP > OR = 140: HCPCS | Performed by: INTERNAL MEDICINE

## 2021-08-17 PROCEDURE — 99214 OFFICE O/P EST MOD 30 MIN: CPT | Performed by: INTERNAL MEDICINE

## 2021-08-17 PROCEDURE — 3288F FALL RISK ASSESSMENT DOCD: CPT | Performed by: INTERNAL MEDICINE

## 2021-08-17 PROCEDURE — 83036 HEMOGLOBIN GLYCOSYLATED A1C: CPT | Performed by: INTERNAL MEDICINE

## 2021-08-17 PROCEDURE — G0463 HOSPITAL OUTPT CLINIC VISIT: HCPCS | Performed by: INTERNAL MEDICINE

## 2021-08-17 PROCEDURE — G8510 SCR DEP NEG, NO PLAN REQD: HCPCS | Performed by: INTERNAL MEDICINE

## 2021-08-17 PROCEDURE — 3017F COLORECTAL CA SCREEN DOC REV: CPT | Performed by: INTERNAL MEDICINE

## 2021-08-17 PROCEDURE — 2022F DILAT RTA XM EVC RTNOPTHY: CPT | Performed by: INTERNAL MEDICINE

## 2021-08-17 RX ORDER — CYCLOBENZAPRINE HCL 10 MG
10 TABLET ORAL AS NEEDED
COMMUNITY
Start: 2021-02-03

## 2021-08-17 RX ORDER — AMIODARONE HYDROCHLORIDE 200 MG/1
1 TABLET ORAL
COMMUNITY
Start: 2021-06-21 | End: 2021-10-27

## 2021-08-17 RX ORDER — LOSARTAN POTASSIUM 50 MG/1
50 TABLET ORAL DAILY
Qty: 90 TABLET | Refills: 1 | Status: SHIPPED | OUTPATIENT
Start: 2021-08-17 | End: 2021-09-21 | Stop reason: SDUPTHER

## 2021-08-17 RX ORDER — AMLODIPINE BESYLATE 5 MG/1
5 TABLET ORAL DAILY
Qty: 90 TABLET | Refills: 1 | Status: SHIPPED | OUTPATIENT
Start: 2021-08-17 | End: 2021-10-27

## 2021-08-17 RX ORDER — LOSARTAN POTASSIUM 100 MG/1
TABLET ORAL
COMMUNITY
Start: 2021-07-16 | End: 2021-08-17 | Stop reason: SDUPTHER

## 2021-08-17 NOTE — PATIENT INSTRUCTIONS
Noninsulin Medicines for Type 2 Diabetes: Care Instructions  Overview     There are different types of noninsulin medicines for diabetes. Each works in a different way. But they all help you control your blood sugar. Some types help your body make insulin to lower your blood sugar. Others lower how much insulin your body needs. Some can slow how fast your body digests sugars. And some can remove extra glucose through your urine. You may need to take more than one medicine for diabetes. Two or more medicines may work better to lower your blood sugar level than just one does. · Metformin. This lowers how much glucose your liver makes. And it helps you respond better to insulin. It also lowers the amount of stored sugar that your liver releases when you are not eating. · Sulfonylureas. These help your body release more insulin. Some work for many hours. They can cause low blood sugar if you don't eat as you planned. An example is glipizide. · Thiazolidinediones. These reduce the amount of blood glucose. They also help you respond better to insulin. An example is pioglitazone. · SGLT2 inhibitors. These help to remove extra glucose through your urine. They may also help some people lose weight. An example is ertugliflozin. · DPP-4 inhibitors. These help your body raise the level of insulin after you eat. They also help your body make less of a hormone that raises blood sugar. An example is alogliptin. · Incretin hormones (GLP-1 receptor agonists). These help your body make a protein that can raise your insulin level and make you less hungry. They're given as shots or pills. An example is semaglutide. · Meglitinides. These help your body release insulin. They also help slow how your body digests sugars. So they can keep your blood sugar from rising too fast after you eat. · Alpha-glucosidase inhibitors. These keep starches from breaking down.  This means that they lower the amount of glucose absorbed when you eat. They don't help your body make more insulin. So they will not cause low blood sugar unless you use them with other medicines for diabetes. Follow-up care is a key part of your treatment and safety. Be sure to make and go to all appointments, and call your doctor if you are having problems. It's also a good idea to know your test results and keep a list of the medicines you take. How can you care for yourself at home? · Eat a healthy diet. Get some exercise each day. This may help you to reduce how much medicine you need. · Do not take other prescription or over-the-counter medicines, vitamins, herbal products, or supplements without talking to your doctor first. Some medicines for type 2 diabetes can cause problems with other medicines or supplements. · Tell your doctor if you plan to get pregnant. Some of these drugs are not safe for pregnant women. · Be safe with medicines. Take your medicines exactly as prescribed. Meglitinides and sulfonylureas can cause your blood sugar to drop very low. Call your doctor if you think you are having a problem with your medicine. · Check your blood sugar often. You can use a glucose monitor. Keeping track can help you know how certain foods, activities, and medicines affect your blood sugar. And it can help you keep your blood sugar from getting so low that it's not safe. When should you call for help? Call 911 anytime you think you may need emergency care. For example, call if:    · You passed out (lost consciousness).     · You are confused or cannot think clearly.     · Your blood sugar is very high or very low. Watch closely for changes in your health, and be sure to contact your doctor if:    · Your blood sugar stays outside the level your doctor set for you.     · You have any problems. Where can you learn more?   Go to http://www.gray.com/  Enter H153 in the search box to learn more about \"Noninsulin Medicines for Type 2 Diabetes: Care Instructions. \"  Current as of: August 31, 2020               Content Version: 12.8  © 3076-6045 [employer], [employer]. Care instructions adapted under license by [employer] (which disclaims liability or warranty for this information). If you have questions about a medical condition or this instruction, always ask your healthcare professional. [place] [address] any warranty or liability for your use of this information.

## 2021-08-17 NOTE — PROGRESS NOTES
A/P:  Freda Mendoza is a 77 y.o. male, he presents today for:    1. Uncontrolled type 2 diabetes mellitus with hyperglycemia (HCC)  -     AMB POC HEMOGLOBIN A1C  -     AMB POC URINE, MICROALBUMIN, SEMIQUANT (3 RESULTS)  -     METABOLIC PANEL, COMPREHENSIVE; Future  -     LIPID PANEL; Future  -      DIABETES FOOT EXAM  2. Essential hypertension  -     losartan (COZAAR) 50 mg tablet; Take 1 Tablet by mouth daily. , Normal, Disp-90 Tablet, R-1  -     amLODIPine (NORVASC) 5 mg tablet; Take 1 Tablet by mouth daily. , Normal, Disp-90 Tablet, R-1  3. Severe obesity (BMI 35.0-35.9 with comorbidity) ([facility]  4. History of prostate cancer    Diabetes: remains above goal control range of < 7.0. On glipizide 2 times daily. States he cannot take metformin due to gynecomastia changes (system lists rash).  - continue daily ASA, statin    - continue glipizide.    - foot exam as below. - discussed addition of SGLT-2 - paitent to opt for jardiance by report received from cardiology 8/25/2021     Cardiac: now follows with Dr. Kalyan Casiano at [facility] heart specialist.   - CAD, dilated LV, EF 65% on 2011 cath. - Paroxysmal Atrial fib, now on amiodarone.  - HTN: numbers at home are better than in office. 130s and lowest 121.   - 8/25/2021 cardiology note indicates: amlodipine 10mg, losartan 100 mg, hctz 25 mg, potassium 20 mg, (dose icrease of amlodipine from 5 to 10 at this appointment)     History of prostate cancer: radiation proctitis noted on 7/2019 colonoscopy. Continues to follow with Dr. Nellie King     asthma: on dulera takes this daily. (still has a lot at home) agrees to restart taking daily.     ROBERT: on CPAP: reports continued use. Sleep medicine Bita Pedroza is currently Dr. Martínez Srinivasan, patient is currently working on adding back in exercise.     GERD: taking apple cider vinegar 5 ml before meal. And finds this is controllingsymptoms.   Constipation: miralax sparingly. Also takes fiber tablets and pills after meals.  This does help. Well man exam: history and exam revealed issues as noted below. screening:    - prostate cancer:  History of, following with Dr. Cj Adame   - colon cancer: completed in 2019. 5 year follow-up requested   - AAA (65+): n/a never smoker, no family history CT abdomen with \"no aotic aneurysm\" in 2017   - lung cancer (55+): n/a never smokier  Vaccine status:    - covid vaccine: patient is declining strongly today. Did discuss at length. Also eligible for shingles. Cardiovascular risk: BP well controlled, lipid screen   Diet and Exercise: not drinking sugary beverages       Follow-up and Dispositions    · Return in about 3 months (around 11/17/2021) for follow-up diabetes. HPI    Treat for achilles tendonpathy. Was in a boot for about 2 months. Has upcoming follow-up. Has a diabetic shoe program. No ongoing pain at this time. This is his first day out of the brace. Has not been on other diabetes medication    Home blood pressures ranging in 150s. Off losartan. Saw Dr. Cj Adame ~ 1 month ago. PSA is falling. Will likely graduate in a year or 2. PMH/PSH: reviewed and updated  Sochx/Famhx: reviewed and updated     All: Allergies   Allergen Reactions    Metformin Rash     Bumps on upper extremities and itching.  Lisinopril Cough    Sulfa (Sulfonamide Antibiotics) Itching    Voltaren [Diclofenac Sodium] Swelling     Med:   Current Outpatient Medications   Medication Sig    amiodarone (CORDARONE) 200 mg tablet Take 1 Tablet by mouth.  cyclobenzaprine (FLEXERIL) 10 mg tablet Take 10 mg by mouth three (3) times daily as needed.  losartan (COZAAR) 100 mg tablet     potassium chloride SR (K-TAB) 20 mEq tablet TAKE ONE TABLET BY MOUTH DAILY (Patient taking differently: two (2) times a day.)    glipiZIDE (GLUCOTROL) 5 mg tablet TAKE ONE TABLET BY MOUTH TWICE A DAY    hydroCHLOROthiazide (HYDRODIURIL) 25 mg tablet Take 1 Tab by mouth daily.     amLODIPine (NORVASC) 5 mg tablet Take 1 Tab by mouth daily.  atorvastatin (LIPITOR) 10 mg tablet Take 1 Tab by mouth daily.  cetirizine (ZYRTEC) 10 mg tablet Take 10 mg by mouth.  cholecalciferol, vitamin D3, (VITAMIN D3) 2,000 unit tab Take  by mouth daily.  mometasone-formoterol (DULERA) 100-5 mcg/actuation HFA inhaler Take 2 Puffs by inhalation two (2) times a day.  albuterol (PROVENTIL HFA) 90 mcg/actuation inhaler Take 2 Puffs by inhalation every four (4) hours as needed.  docusate sodium (STOOL SOFTENER) 100 mg capsule Take 200 mg by mouth daily.  pyridoxine (VITAMIN B-6) 100 mg tablet Take 100 mg by mouth daily.  losartan (COZAAR) 50 mg tablet TAKE ONE TABLET BY MOUTH ONCE DAILY (Patient not taking: Reported on 8/17/2021)    pramoxine (Proctofoam) 1 % topical foam Apply  to affected area three (3) times daily as needed for Hemorrhoids. (Patient not taking: Reported on 8/17/2021)    dronedarone (MULTAQ) Tab tablet Take 1 Tab by mouth two (2) times daily (with meals). (Patient not taking: Reported on 8/17/2021)     No current facility-administered medications for this visit. ROS pertinent for the following:  Review of Systems   Constitutional: Negative for chills, fever and malaise/fatigue. Respiratory: Negative for shortness of breath. Cardiovascular: Negative for chest pain. PE:  Blood pressure (!) 157/83, pulse 85, temperature 98.4 °F (36.9 °C), temperature source Oral, resp. rate 18, height 5' 6\" (1.676 m), weight 241 lb (109.3 kg), SpO2 98 %. Body mass index is 38.9 kg/m². Physical Exam  Vitals and nursing note reviewed. Constitutional:       General: He is not in acute distress. Appearance: Normal appearance. He is well-developed. He is obese. HENT:      Head: Normocephalic and atraumatic. Right Ear: Tympanic membrane normal.      Left Ear: Tympanic membrane normal.   Eyes:      Extraocular Movements: Extraocular movements intact.       Conjunctiva/sclera: Conjunctivae normal. Pupils: Pupils are equal, round, and reactive to light. Neck:      Comments: Nodular mobile mass on posterior neck ~ 2 cm in diameter. Patient states unchanged for 15 years  Cardiovascular:      Rate and Rhythm: Normal rate and regular rhythm. Pulses: Normal pulses. Heart sounds: Normal heart sounds. Pulmonary:      Effort: Pulmonary effort is normal.      Breath sounds: Normal breath sounds. Musculoskeletal:         General: Normal range of motion. Cervical back: Normal range of motion and neck supple. Right lower leg: No edema. Left lower leg: No edema. Skin:     General: Skin is warm. Neurological:      General: No focal deficit present. Mental Status: He is alert and oriented to person, place, and time. Psychiatric:         Mood and Affect: Mood normal.         Behavior: Behavior normal.       Foot exam    Right foot with prominence at heel,   Sensation intact to fine touch x6 bilaterally  Fallen arches bilaterally. Callous formatio bilaterally    Labs:   See addendum for interpretation of labs resulting after time of visit. Results for orders placed or performed in visit on 08/17/21   AMB POC HEMOGLOBIN A1C   Result Value Ref Range    Hemoglobin A1c (POC) 7.6 %     He was given AVS and expressed understanding with the diagnosis and plan as discussed. An electronic signature was used to authenticate this note.   -- Amilcar Mueller MD

## 2021-08-17 NOTE — PROGRESS NOTES
RM 2    Chief Complaint   Patient presents with    Annual Wellness Visit    Medication Evaluation       1. Have you been to the ER, urgent care clinic since your last visit? Hospitalized since your last visit? No    2. Have you seen or consulted any other health care providers outside of the [address] [place] [address] Eddie since your last visit? Include any pap smears or colon screening. Strained back, achillis tendon injury Dr. Nemo Wong Maintenance Due   Topic Date Due    COVID-19 Vaccine (1) Never done    Shingrix Vaccine Age 50> (1 of 2) Never done    Eye Exam Retinal or Dilated  06/12/2019    Medicare Yearly Exam  Never done    Foot Exam Q1  06/11/2021    MICROALBUMIN Q1  06/11/2021    Lipid Screen  06/19/2021       3 most recent PHQ Screens 8/17/2021   Little interest or pleasure in doing things Not at all   Feeling down, depressed, irritable, or hopeless Not at all   Total Score PHQ 2 0     Fall Risk Assessment, last 12 mths 8/17/2021   Able to walk? Yes   Fall in past 12 months? 0   Do you feel unsteady? 0   Are you worried about falling 0   Number of falls in past 12 months -   Fall with injury? -     Abuse Screening Questionnaire 8/17/2021   Do you ever feel afraid of your partner? N   Are you in a relationship with someone who physically or mentally threatens you? N   Is it safe for you to go home?  Y     ADL Assessment 8/17/2021   Feeding yourself No Help Needed   Getting from bed to chair No Help Needed   Getting dressed No Help Needed   Bathing or showering No Help Needed   Walk across the room (includes cane/walker) No Help Needed   Using the telphone No Help Needed   Taking your medications No Help Needed   Preparing meals No Help Needed   Managing money (expenses/bills) No Help Needed   Moderately strenuous housework (laundry) No Help Needed   Shopping for personal items (toiletries/medicines) No Help Needed   Shopping for groceries No Help Needed   Driving No Help Needed   Climbing a flight of stairs No Help Needed   Getting to places beyond walking distances No Help Needed       Learning Assessment 6/10/2020   PRIMARY LEARNER Patient   PRIMARY LANGUAGE ENGLISH   LEARNER PREFERENCE PRIMARY DEMONSTRATION   ANSWERED BY patient   RELATIONSHIP SELF       AVS  education, follow up, and recommendations provided and addressed with patient.  services used to advise patient no .

## 2021-08-19 ENCOUNTER — TELEPHONE (OUTPATIENT)
Dept: INTERNAL MEDICINE CLINIC | Age: 66
End: 2021-08-19

## 2021-08-19 DIAGNOSIS — I10 ESSENTIAL HYPERTENSION: Primary | ICD-10-CM

## 2021-08-19 DIAGNOSIS — E87.6 HYPOKALEMIA: Primary | ICD-10-CM

## 2021-08-19 DIAGNOSIS — I10 ESSENTIAL HYPERTENSION: ICD-10-CM

## 2021-08-19 DIAGNOSIS — E87.6 HYPOKALEMIA: ICD-10-CM

## 2021-08-19 RX ORDER — POTASSIUM CHLORIDE 1500 MG/1
20 TABLET, FILM COATED, EXTENDED RELEASE ORAL 2 TIMES DAILY
Qty: 60 TABLET | Refills: 2 | Status: SHIPPED | OUTPATIENT
Start: 2021-08-19 | End: 2022-01-05

## 2021-08-19 NOTE — TELEPHONE ENCOUNTER
Please call patient and advise potassium is low. - I believe he has been taking 2 tablets of potassium a day (per cardiology recommendation) - he should continue this. I have sent a refill to the pharmacy. - I would like to repeat his test with a magnesium check. If this is still low, plan to change blood pressure medication.      Please schedule for repeat lab test    Vasu King MD

## 2021-08-19 NOTE — PROGRESS NOTES
Hypokalemia noted. Patient is on potassium supplement. Last evaluated with cardiology. Mild progression in ckd. Called and left message on patient phone regarding continuing potassium supplement. May need to stop hctz.      See phone note

## 2021-08-19 NOTE — TELEPHONE ENCOUNTER
[facility] Pt Record # [id number] Statement Of Certifying Physician for Therapeutic Shoes signed on 8/17/21 by provider,faxed on 8/18/21 to # [phone] Pt Record # [id number] confirmation received and scanned into CC.

## 2021-08-24 NOTE — TELEPHONE ENCOUNTER
Message was left on secure voicemail, informing pt that potassium level was low, advised to continue the daily potassium tabs and that a refill was sent in for him. Advised that Dr. Gato Lizarraga would like for him to repeat magnesium level check. Requested a call back to schedule for a repeat lab apt here.

## 2021-08-26 ENCOUNTER — TELEPHONE (OUTPATIENT)
Dept: INTERNAL MEDICINE CLINIC | Age: 66
End: 2021-08-26

## 2021-08-26 PROBLEM — C61 PROSTATE CANCER (HCC): Status: RESOLVED | Noted: 2017-03-13 | Resolved: 2021-08-26

## 2021-08-26 PROBLEM — Z85.46 HISTORY OF PROSTATE CANCER: Status: ACTIVE | Noted: 2021-08-26

## 2021-08-26 NOTE — TELEPHONE ENCOUNTER
Received copy of visit note from cardiology. Indicates patient may be ready to start on new diabetse medication. Jardiance. Please verify that this is true and if so I will send a prescription for jardiance 10 mg.    - after starting jardiance, if blood sugars are below 80, we will plan to reduce glipizide,    - otherwise follow-up as scheduled in November.      Jose Rushing MD

## 2021-08-27 NOTE — TELEPHONE ENCOUNTER
Message left of secure voicemail. Advised that a note was received indicating that he is ready to start new diabetic med, Jadiance. Requested pt call back to verify if he would like to start this new medication.

## 2021-08-31 NOTE — TELEPHONE ENCOUNTER
Left 2nd voice mail for patient on secure voice mail. Advised pt to call the office to discuss if he would like to make changes to his diabetes medications. Otherwise we will follow up on this as scheduled. Call back number provided.

## 2021-09-02 ENCOUNTER — APPOINTMENT (OUTPATIENT)
Dept: INTERNAL MEDICINE CLINIC | Age: 66
End: 2021-09-02

## 2021-09-02 ENCOUNTER — TELEPHONE (OUTPATIENT)
Dept: INTERNAL MEDICINE CLINIC | Age: 66
End: 2021-09-02

## 2021-09-02 DIAGNOSIS — E87.6 HYPOKALEMIA: ICD-10-CM

## 2021-09-02 DIAGNOSIS — I10 ESSENTIAL HYPERTENSION: ICD-10-CM

## 2021-09-02 DIAGNOSIS — E11.65 UNCONTROLLED TYPE 2 DIABETES MELLITUS WITH HYPERGLYCEMIA (HCC): Primary | ICD-10-CM

## 2021-09-02 LAB
ALBUMIN SERPL-MCNC: 3.3 G/DL (ref 3.5–5)
ANION GAP SERPL CALC-SCNC: 7 MMOL/L (ref 5–15)
BUN SERPL-MCNC: 16 MG/DL (ref 6–20)
BUN/CREAT SERPL: 13 (ref 12–20)
CALCIUM SERPL-MCNC: 8.7 MG/DL (ref 8.5–10.1)
CHLORIDE SERPL-SCNC: 107 MMOL/L (ref 97–108)
CO2 SERPL-SCNC: 25 MMOL/L (ref 21–32)
CREAT SERPL-MCNC: 1.25 MG/DL (ref 0.7–1.3)
GLUCOSE SERPL-MCNC: 222 MG/DL (ref 65–100)
MAGNESIUM SERPL-MCNC: 2.2 MG/DL (ref 1.6–2.4)
PHOSPHATE SERPL-MCNC: 3.1 MG/DL (ref 2.6–4.7)
POTASSIUM SERPL-SCNC: 3.1 MMOL/L (ref 3.5–5.1)
SODIUM SERPL-SCNC: 139 MMOL/L (ref 136–145)

## 2021-09-02 NOTE — TELEPHONE ENCOUNTER
Please call and advise:     Potassium returned low again. AT this time I would like patient to change blood pressure medicine as the hydrochlorothiazide is probably causing him to lose extra potassium. Attempted to call. No answer. Left message     Plan to decrease hydrochlorthiazide from 25 to 12.5 mg and increase amlodipine from 5 to 10mg.      Chun Daniel MD

## 2021-09-02 NOTE — TELEPHONE ENCOUNTER
Pt came in for blood work and he stated hew was notified that he was to talk to provider or nurse in regards to his diabetes medication , opt is asking to have a call to discuss, he was unable to talk with dr or nurse while he was here due to them being with pt at the moment.

## 2021-09-02 NOTE — TELEPHONE ENCOUNTER
Patient reports he would like to start the [facility] pharmacy on file. He will keep track of blood sugars and notify us if his Blood sugar drops below 80.

## 2021-09-02 NOTE — TELEPHONE ENCOUNTER
jardiance 10 sent to pharmacy    Future Appointments   Date Time Provider Yesy Emeli   11/18/2021  3:00 PM Radha Robert MD [facility]

## 2021-09-03 RX ORDER — HYDROCHLOROTHIAZIDE 25 MG/1
25 TABLET ORAL DAILY
Qty: 90 TABLET | Refills: 1 | Status: SHIPPED | OUTPATIENT
Start: 2021-09-03 | End: 2021-09-03

## 2021-09-03 RX ORDER — HYDROCHLOROTHIAZIDE 12.5 MG/1
12.5 TABLET ORAL DAILY
Qty: 90 TABLET | Refills: 1 | Status: SHIPPED | OUTPATIENT
Start: 2021-09-03 | End: 2022-02-27 | Stop reason: SDUPTHER

## 2021-09-03 NOTE — TELEPHONE ENCOUNTER
Patient reports cardiologist increased amlodipine to 10mg already so he is taking that now. He will decrease HCTZ to 12.5 but would like rx sent to pharmacy for this dosage as he does not think he could break the pill in half because it is already so small.

## 2021-09-21 ENCOUNTER — OFFICE VISIT (OUTPATIENT)
Dept: INTERNAL MEDICINE CLINIC | Age: 66
End: 2021-09-21
Payer: MEDICARE

## 2021-09-21 VITALS — BODY MASS INDEX: 39.41 KG/M2 | TEMPERATURE: 98.6 F | HEIGHT: 66 IN | OXYGEN SATURATION: 98 % | WEIGHT: 245.2 LBS

## 2021-09-21 DIAGNOSIS — E87.6 HYPOKALEMIA: ICD-10-CM

## 2021-09-21 DIAGNOSIS — R42 DIZZINESS: Primary | ICD-10-CM

## 2021-09-21 DIAGNOSIS — I10 ESSENTIAL HYPERTENSION: ICD-10-CM

## 2021-09-21 DIAGNOSIS — E11.8 CONTROLLED TYPE 2 DIABETES MELLITUS WITH COMPLICATION, WITHOUT LONG-TERM CURRENT USE OF INSULIN (HCC): ICD-10-CM

## 2021-09-21 PROCEDURE — G8536 NO DOC ELDER MAL SCRN: HCPCS | Performed by: INTERNAL MEDICINE

## 2021-09-21 PROCEDURE — G0463 HOSPITAL OUTPT CLINIC VISIT: HCPCS | Performed by: INTERNAL MEDICINE

## 2021-09-21 PROCEDURE — 3288F FALL RISK ASSESSMENT DOCD: CPT | Performed by: INTERNAL MEDICINE

## 2021-09-21 PROCEDURE — G8510 SCR DEP NEG, NO PLAN REQD: HCPCS | Performed by: INTERNAL MEDICINE

## 2021-09-21 PROCEDURE — 3051F HG A1C>EQUAL 7.0%<8.0%: CPT | Performed by: INTERNAL MEDICINE

## 2021-09-21 PROCEDURE — G8427 DOCREV CUR MEDS BY ELIG CLIN: HCPCS | Performed by: INTERNAL MEDICINE

## 2021-09-21 PROCEDURE — 3017F COLORECTAL CA SCREEN DOC REV: CPT | Performed by: INTERNAL MEDICINE

## 2021-09-21 PROCEDURE — G8417 CALC BMI ABV UP PARAM F/U: HCPCS | Performed by: INTERNAL MEDICINE

## 2021-09-21 PROCEDURE — 2022F DILAT RTA XM EVC RTNOPTHY: CPT | Performed by: INTERNAL MEDICINE

## 2021-09-21 PROCEDURE — 1100F PTFALLS ASSESS-DOCD GE2>/YR: CPT | Performed by: INTERNAL MEDICINE

## 2021-09-21 PROCEDURE — G8756 NO BP MEASURE DOC: HCPCS | Performed by: INTERNAL MEDICINE

## 2021-09-21 PROCEDURE — 99214 OFFICE O/P EST MOD 30 MIN: CPT | Performed by: INTERNAL MEDICINE

## 2021-09-21 RX ORDER — LOSARTAN POTASSIUM 50 MG/1
100 TABLET ORAL DAILY
Qty: 90 TABLET | Refills: 1
Start: 2021-09-21 | End: 2021-10-27

## 2021-09-21 NOTE — PROGRESS NOTES
Maria M Olsen (: 1955) is a 77 y.o. male, established patient, here for evaluation of the following chief complaint(s):  Chief Complaint   Patient presents with    Dizziness     3 episodes of dizziness. started a week ago. last episode most mild. first episode triggered when sitting up. no nausea. can be trigged by head movements. no resolve with closing eyes. suspects that it is due to new medication jardiance , has not taken in the last 5 days       Assessment and Plan:       ICD-10-CM ICD-9-CM    1. Dizziness  R42 780.4    2. Essential hypertension  I10 401.9 losartan (COZAAR) 50 mg tablet   3. Controlled type 2 diabetes mellitus with complication, without long-term current use of insulin (HCC)  E11.8 250.90    4. Hypokalemia  E87.6 276.8        1-3:  Reviewed with PCP at visit--plan as below and per instructions:  --increase losartan to 100mg daily. --continue to hold Jardiance. 4.  Follow-up with PCP as reviewed. Follow-up and Dispositions    · Return in about 2 weeks (around 10/5/2021) for Blood Pressure follow-up, medication follow-up, non-fasting labs. reviewed diet, exercise and weight control  reviewed medications and side effects in detail    For additional documentation of information and/or recommendations discussed this visit, please see notes in instructions. Plan and evaluation (above) reviewed with pt at visit  Patient voiced understanding of plan and provided with time to ask/review questions. After Visit Summary (AVS) provided to pt after visit with additional instructions as needed/reviewed. Future Appointments   Date Time Provider Yesy Sainz   2021  3:00 PM Rocío Briceno MD [facility]   --Updated future visits after patient check-out. History of Present Illness:     Notes (nursing/rooming note copied below in italics):  Pt is not fasting    PCP:  Rocío Briceno MD    Notes symptoms as above.     Lab Results   Component Value Date/Time    Hemoglobin A1c 7.7 (H) 09/24/2019 11:40 AM    Hemoglobin A1c (POC) 7.6 08/17/2021 10:06 AM     Episodes started 1wk ago. Had started Jardiance 3-4 days prior to onset. This was only new medication. Had noted dizziness as warning. He didn't note much difference with this initially on starting. He noted as part of his regular daily routine. It didn't seem to happen in AM but occurred later in day. Has had 3 episodes. First time he had been out, driving, and no symptoms. At home, sat on bed and bent over and had significant dizziness with sitting up then. The other 2 episodes were less intense, but with moving, looking down, had similar dizziness. 2nd episode was on toilet, and felt prickling sensation posterior neck, \"like little fingers moving up your head\". 3rd episode was light. He had noted more UOP with starting Jardiance. He has had this with diuretic in past and had decreased dose of HCTZ. He had decreased HCTZ dose with addition of Jardiance to prevent over diuresis. He notes still more UOP with changes above, with starting Jardiance. He has been off Jardiance 5 days and has felt better. All symptoms have resolved. He has also had HGM with 2 readings that were low--1 was 84 and one was 80. He had not had consistently. These values were after physical activity--going to store, stopping, groceries. Later in day, closer to dinner, he would also have lows, between shopping and dinner. When he feels low, he checked and had 2 values above. Typically will be 135 at those times--low at this time would be 75 prior to [place]. Wt Readings from Last 3 Encounters:   09/21/21 245 lb 3.2 oz (111.2 kg)   08/17/21 241 lb (109.3 kg)   02/05/21 239 lb (108.4 kg)     BP Readings from Last 3 Encounters:   08/17/21 (!) 157/83   02/05/21 (!) 154/92   01/26/21 (!) 143/90       Nursing screenings reviewed by provider at visit.       Past Medical History: Diagnosis Date    Asthma     Atrial fibrillation ([facility]     CAD (coronary artery disease)     Cancer ([facility] 2013    prostate 48 radiation treatments    Diabetes ([facility]     ED (erectile dysfunction)     Enlarged heart     Hypertension     Sleep apnea     uses CPAP     Past Surgical History:   Procedure Laterality Date    CARDIAC CATHETERIZATION      COLONOSCOPY N/A 7/19/2019    COLONOSCOPY performed by Joe Diaz MD at [facility] ENDOSCOPY    HX ORTHOPAEDIC      right and left knee surgery scoped    HX OTHER SURGICAL      prostate procedure-radiation tx    WA ANESTH,KNEE AREA SURGERY          Prior to Admission medications    Medication Sig Start Date End Date Taking? Authorizing Provider   hydroCHLOROthiazide (HYDRODIURIL) 12.5 mg tablet Take 1 Tablet by mouth daily. 9/3/21  Yes Joaquín Levine MD   empagliflozin (Jardiance) 10 mg tablet Take 1 Tablet by mouth daily. 9/2/21  Yes Joaquín Levine MD   potassium chloride SR (K-TAB) 20 mEq tablet Take 1 Tablet by mouth two (2) times a day. 8/19/21  Yes Joaquín Levine MD   cyclobenzaprine (FLEXERIL) 10 mg tablet Take 10 mg by mouth three (3) times daily as needed. 2/3/21  Yes Provider, Historical   losartan (COZAAR) 50 mg tablet Take 1 Tablet by mouth daily. 8/17/21  Yes Joaquín Levine MD   atorvastatin (LIPITOR) 10 mg tablet Take 1 Tab by mouth daily. 1/21/21  Yes Joaquín Levine MD   cetirizine (ZYRTEC) 10 mg tablet Take 10 mg by mouth. 5/23/17  Yes Provider, Historical   cholecalciferol, vitamin D3, (VITAMIN D3) 2,000 unit tab Take  by mouth daily. Yes Provider, Historical   mometasone-formoterol (DULERA) 100-5 mcg/actuation HFA inhaler Take 2 Puffs by inhalation two (2) times a day. 11/20/17  Yes Rubia Erazo MD   albuterol (PROVENTIL HFA) 90 mcg/actuation inhaler Take 2 Puffs by inhalation every four (4) hours as needed.    Yes Other, MD Sunshine   docusate sodium (STOOL SOFTENER) 100 mg capsule Take 200 mg by mouth daily. 4/27/11  Yes Provider, Historical   pyridoxine (VITAMIN B-6) 100 mg tablet Take 100 mg by mouth daily. Yes Other, MD Sunshine   amiodarone (CORDARONE) 200 mg tablet Take 1 Tablet by mouth. Patient not taking: Reported on 9/21/2021 6/21/21   Provider, Historical   amLODIPine (NORVASC) 5 mg tablet Take 1 Tablet by mouth daily. 8/17/21   Cory Guerra MD   glipiZIDE (GLUCOTROL) 5 mg tablet TAKE ONE TABLET BY MOUTH TWICE A DAY 7/15/21   Cory Guerra MD        ROS    Vitals:    09/21/21 1207 09/21/21 1211 09/21/21 1213   BP 2: 160/76 146/88 153/88   BP 1 Location: Right arm Right arm Right arm   BP Patient Position: Lying Sitting Standing   Pulse 2: 86     Temp: 98.6 °F (37 °C)     Height: 5' 6\" (1.676 m)     Weight: 245 lb 3.2 oz (111.2 kg)     SpO2: 98%       Body mass index is 39.58 kg/m². Physical Exam:     Physical Exam  Vitals and nursing note reviewed. Constitutional:       General: He is not in acute distress. Appearance: Normal appearance. He is well-developed. He is not diaphoretic. HENT:      Head: Normocephalic and atraumatic. Eyes:      General: No scleral icterus. Right eye: No discharge. Left eye: No discharge. Conjunctiva/sclera: Conjunctivae normal.   Cardiovascular:      Rate and Rhythm: Normal rate and regular rhythm. Pulses: Normal pulses. Heart sounds: Normal heart sounds. No murmur heard. No friction rub. No gallop. Pulmonary:      Effort: Pulmonary effort is normal. No respiratory distress. Breath sounds: Normal breath sounds. No stridor. No wheezing, rhonchi or rales. Abdominal:      General: Bowel sounds are normal. There is no distension. Palpations: Abdomen is soft. Tenderness: There is no abdominal tenderness. There is no guarding or rebound. Musculoskeletal:         General: No swelling, tenderness or deformity. Right lower leg: Edema present. Left lower leg: Edema present.       Comments: 2+ LE edema bilat. Skin:     General: Skin is warm. Coloration: Skin is not jaundiced or pale. Findings: No bruising, erythema or rash. Neurological:      General: No focal deficit present. Mental Status: He is alert. Motor: No abnormal muscle tone. Coordination: Coordination normal.      Gait: Gait normal.   Psychiatric:         Mood and Affect: Mood normal.         Behavior: Behavior normal.         Thought Content: Thought content normal.         Judgment: Judgment normal.         An electronic signature was used to authenticate this note.   -- Bob Connors MD

## 2021-09-21 NOTE — PATIENT INSTRUCTIONS
1.  Increase the losartan to 100mg daily. You can take two of the 50mg tabs once daily until they are gone. 2.  Remain off the Jardiance at this time. 3.  Please let Dr. Argenis Munoz know how many of the 100ng losartan you have at your next appt.       Vitals:    09/21/21 1207 09/21/21 1211 09/21/21 1213   BP 2: 160/76 146/88 153/88   BP 1 Location: Right arm Right arm Right arm   BP Patient Position: Lying Sitting Standing   Pulse 2: 86     Temp: 98.6 °F (37 °C)     Height: 5' 6\" (1.676 m)     Weight: 245 lb 3.2 oz (111.2 kg)     SpO2: 98%

## 2021-09-21 NOTE — PROGRESS NOTES
RM 16  Pt is not fasting    Chief Complaint   Patient presents with    Dizziness     3 episodes of dizziness. started a week ago. last episode most mild. first episode triggered when sitting up. no nausea. can be trigged by head movements. no resolve with closing eyes. suspects that it is due to new medication jardiance , has not taken in the last 5 days       Visit Vitals  Temp 98.6 °F (37 °C)   Ht 5' 6\" (1.676 m)   Wt 245 lb 3.2 oz (111.2 kg)   SpO2 98%   BMI 39.58 kg/m²       3 most recent PHQ Screens 9/21/2021   Little interest or pleasure in doing things Not at all   Feeling down, depressed, irritable, or hopeless Not at all   Total Score PHQ 2 0         1. Have you been to the ER, urgent care clinic since your last visit? Hospitalized since your last visit? No    2. Have you seen or consulted any other health care providers outside of the [address] [place] [address] since your last visit? Include any pap smears or colon screening. Cardiologist Dr. Lam Guido Maintenance Due   Topic Date Due    COVID-19 Vaccine (1) Never done    Shingrix Vaccine Age 50> (1 of 2) Never done    Eye Exam Retinal or Dilated  06/12/2019    Medicare Yearly Exam  Never done    Flu Vaccine (1) 09/01/2021       Learning Assessment 6/10/2020   PRIMARY LEARNER Patient   PRIMARY LANGUAGE ENGLISH   LEARNER PREFERENCE PRIMARY DEMONSTRATION   ANSWERED BY patient   RELATIONSHIP SELF     AVS  education, follow up, and recommendations provided and addressed with patient.

## 2021-10-27 ENCOUNTER — OFFICE VISIT (OUTPATIENT)
Dept: INTERNAL MEDICINE CLINIC | Age: 66
End: 2021-10-27
Payer: MEDICARE

## 2021-10-27 VITALS
WEIGHT: 244.6 LBS | TEMPERATURE: 97.8 F | DIASTOLIC BLOOD PRESSURE: 83 MMHG | BODY MASS INDEX: 39.31 KG/M2 | HEART RATE: 64 BPM | RESPIRATION RATE: 16 BRPM | OXYGEN SATURATION: 99 % | SYSTOLIC BLOOD PRESSURE: 141 MMHG | HEIGHT: 66 IN

## 2021-10-27 DIAGNOSIS — E11.65 UNCONTROLLED TYPE 2 DIABETES MELLITUS WITH HYPERGLYCEMIA (HCC): Primary | ICD-10-CM

## 2021-10-27 DIAGNOSIS — I10 ESSENTIAL HYPERTENSION: ICD-10-CM

## 2021-10-27 LAB
ALBUMIN SERPL-MCNC: 3.6 G/DL (ref 3.5–5)
ANION GAP SERPL CALC-SCNC: 6 MMOL/L (ref 5–15)
BUN SERPL-MCNC: 11 MG/DL (ref 6–20)
BUN/CREAT SERPL: 10 (ref 12–20)
CALCIUM SERPL-MCNC: 8.9 MG/DL (ref 8.5–10.1)
CHLORIDE SERPL-SCNC: 106 MMOL/L (ref 97–108)
CO2 SERPL-SCNC: 25 MMOL/L (ref 21–32)
CREAT SERPL-MCNC: 1.13 MG/DL (ref 0.7–1.3)
GLUCOSE SERPL-MCNC: 191 MG/DL (ref 65–100)
PHOSPHATE SERPL-MCNC: 2.7 MG/DL (ref 2.6–4.7)
POTASSIUM SERPL-SCNC: 3.3 MMOL/L (ref 3.5–5.1)
SODIUM SERPL-SCNC: 137 MMOL/L (ref 136–145)

## 2021-10-27 PROCEDURE — G8754 DIAS BP LESS 90: HCPCS | Performed by: INTERNAL MEDICINE

## 2021-10-27 PROCEDURE — G0463 HOSPITAL OUTPT CLINIC VISIT: HCPCS | Performed by: INTERNAL MEDICINE

## 2021-10-27 PROCEDURE — 1100F PTFALLS ASSESS-DOCD GE2>/YR: CPT | Performed by: INTERNAL MEDICINE

## 2021-10-27 PROCEDURE — 3017F COLORECTAL CA SCREEN DOC REV: CPT | Performed by: INTERNAL MEDICINE

## 2021-10-27 PROCEDURE — G8417 CALC BMI ABV UP PARAM F/U: HCPCS | Performed by: INTERNAL MEDICINE

## 2021-10-27 PROCEDURE — G8427 DOCREV CUR MEDS BY ELIG CLIN: HCPCS | Performed by: INTERNAL MEDICINE

## 2021-10-27 PROCEDURE — G8536 NO DOC ELDER MAL SCRN: HCPCS | Performed by: INTERNAL MEDICINE

## 2021-10-27 PROCEDURE — 3288F FALL RISK ASSESSMENT DOCD: CPT | Performed by: INTERNAL MEDICINE

## 2021-10-27 PROCEDURE — 3051F HG A1C>EQUAL 7.0%<8.0%: CPT | Performed by: INTERNAL MEDICINE

## 2021-10-27 PROCEDURE — G8510 SCR DEP NEG, NO PLAN REQD: HCPCS | Performed by: INTERNAL MEDICINE

## 2021-10-27 PROCEDURE — 2022F DILAT RTA XM EVC RTNOPTHY: CPT | Performed by: INTERNAL MEDICINE

## 2021-10-27 PROCEDURE — G8753 SYS BP > OR = 140: HCPCS | Performed by: INTERNAL MEDICINE

## 2021-10-27 PROCEDURE — 99214 OFFICE O/P EST MOD 30 MIN: CPT | Performed by: INTERNAL MEDICINE

## 2021-10-27 RX ORDER — LOSARTAN POTASSIUM 100 MG/1
100 TABLET ORAL DAILY
Qty: 90 TABLET | Refills: 1 | Status: SHIPPED | OUTPATIENT
Start: 2021-10-27 | End: 2022-02-27 | Stop reason: SDUPTHER

## 2021-10-27 RX ORDER — AMLODIPINE BESYLATE 10 MG/1
10 TABLET ORAL DAILY
Qty: 90 TABLET | Refills: 1 | Status: SHIPPED | OUTPATIENT
Start: 2021-10-27 | End: 2022-08-17 | Stop reason: SDUPTHER

## 2021-10-27 RX ORDER — MECLIZINE HYDROCHLORIDE 25 MG/1
TABLET ORAL
COMMUNITY
Start: 2021-10-09

## 2021-10-27 NOTE — PROGRESS NOTES
A/P:  Willis Graves is a 77 y.o. male, he presents today for:    1. Uncontrolled type 2 diabetes mellitus with hyperglycemia (HCC)  -     semaglutide (Rybelsus) 3 mg tablet; Take 1 Tablet by mouth Daily (before breakfast). , Normal, Disp-30 Tablet, R-0Stop jardiance, stop glipizide  -     RENAL FUNCTION PANEL; Future  2. Essential hypertension  -     losartan (COZAAR) 100 mg tablet; Take 1 Tablet by mouth daily. , Normal, Disp-90 Tablet, R-1  -     amLODIPine (NORVASC) 10 mg tablet; Take 1 Tablet by mouth daily. , Normal, Disp-90 Tablet, R-1  -     RENAL FUNCTION PANEL; Future    Diabetes: does not tolerate metformin - Gi side efects. Jardiance stopped due to lightheadedness. Currently on glipizide 5mg bid, but having low blood sugars and high morning fasting    - stop glipizide   - start rybelsus 3mg    - follow-up in 1 month. Hypokalemia with HTN   - new since 2019   - on full dose arb, reduce hctz. Taking potassium 20mg. - remained low in ER 3 weeks ago. - repeat level today. Future Appointments   Date Time Provider Yesy Sainz   11/18/2021  3:00 PM Rosalio Valencia MD [facility] BS AMB       [facility]    77year old man with poorly controlled diabetes  Lab Results   Component Value Date/Time    Hemoglobin A1c 7.7 (H) 09/24/2019 11:40 AM    Hemoglobin A1c (POC) 7.6 08/17/2021 10:06 AM     started on jardiance In august.   See at 1 Dolores Friend on 10/8/2021 - dx with hypokalmia    Persistent hypokamelia - decreased hctz 12.5mg   - patient reports he is taking potassium weekly. - hydrochlorthiaizide reduced to 12.5    Jardiacnce: patient reports he stopped this because of dizziness in 9/29. He was having increased urine output. - blood sugar has been \"Off the chart here and there\". - has had some low blood sugars 73-87. Morning fasting have been high 140 etc.    -      Dizziness - \"better\" was at [employer] and after turning head started having spinning sensation - no nausea.  Was able to rest and it resolved on its own. Does not remembering any shortness of breath. Was able to [employer] shopping, loaded care went home. Had 1 prior episode last year     No specific recurrence, but does take meclizine if he feels a \"warning feeling\"   - has taken only 2 times. PMH/PSH: reviewed and updated  Sochx/Famhx: reviewed and updated     All: Allergies   Allergen Reactions    Metformin Rash     Bumps on upper extremities and itching.  Lisinopril Cough    Sulfa (Sulfonamide Antibiotics) Itching    Voltaren [Diclofenac Sodium] Swelling     Med:   Current Outpatient Medications   Medication Sig    meclizine (ANTIVERT) 25 mg tablet     losartan (COZAAR) 50 mg tablet Take 2 Tablets by mouth daily.  hydroCHLOROthiazide (HYDRODIURIL) 12.5 mg tablet Take 1 Tablet by mouth daily.  potassium chloride SR (K-TAB) 20 mEq tablet Take 1 Tablet by mouth two (2) times a day.  cyclobenzaprine (FLEXERIL) 10 mg tablet Take 10 mg by mouth as needed.  amLODIPine (NORVASC) 5 mg tablet Take 1 Tablet by mouth daily.  glipiZIDE (GLUCOTROL) 5 mg tablet TAKE ONE TABLET BY MOUTH TWICE A DAY    cetirizine (ZYRTEC) 10 mg tablet Take 10 mg by mouth.  cholecalciferol, vitamin D3, (VITAMIN D3) 2,000 unit tab Take  by mouth daily.  mometasone-formoterol (DULERA) 100-5 mcg/actuation HFA inhaler Take 2 Puffs by inhalation two (2) times a day.  albuterol (PROVENTIL HFA) 90 mcg/actuation inhaler Take 2 Puffs by inhalation every four (4) hours as needed.  docusate sodium (STOOL SOFTENER) 100 mg capsule Take 200 mg by mouth daily.  pyridoxine (VITAMIN B-6) 100 mg tablet Take 100 mg by mouth daily.  empagliflozin (Jardiance) 10 mg tablet Take 1 Tablet by mouth daily. (Patient not taking: Reported on 10/27/2021)    amiodarone (CORDARONE) 200 mg tablet Take 1 Tablet by mouth. (Patient not taking: Reported on 9/21/2021)    atorvastatin (LIPITOR) 10 mg tablet Take 1 Tab by mouth daily.      No current facility-administered medications for this visit. ROS pertinent for the following:  ROS    PE:  Blood pressure (!) 141/83, pulse 64, temperature 97.8 °F (36.6 °C), temperature source Oral, resp. rate 16, height 5' 6\" (1.676 m), weight 244 lb 9.6 oz (110.9 kg), SpO2 99 %. There is no height or weight on file to calculate BMI. Physical Exam  Vitals and nursing note reviewed. Constitutional:       General: He is not in acute distress. Appearance: Normal appearance. He is well-developed. HENT:      Head: Normocephalic and atraumatic. Right Ear: Tympanic membrane normal.      Left Ear: Tympanic membrane normal.   Eyes:      Extraocular Movements: Extraocular movements intact. Conjunctiva/sclera: Conjunctivae normal.      Pupils: Pupils are equal, round, and reactive to light. Cardiovascular:      Rate and Rhythm: Normal rate and regular rhythm. Pulses: Normal pulses. Heart sounds: Normal heart sounds. Pulmonary:      Effort: Pulmonary effort is normal.      Breath sounds: Normal breath sounds. Musculoskeletal:         General: Normal range of motion. Cervical back: Normal range of motion and neck supple. Skin:     General: Skin is warm. Neurological:      General: No focal deficit present. Mental Status: He is alert and oriented to person, place, and time. Labs:   See addendum for interpretation of labs resulting after time of visit. He was given AVS and expressed understanding with the diagnosis and plan as discussed. An electronic signature was used to authenticate this note.   -- Jose Cruz Martinez MD

## 2021-10-27 NOTE — PATIENT INSTRUCTIONS
For diabetes   - plan to start rybelsus   - when you start this, stop glipizide   - follow-up in 1 month, we will likely increase your medication dose of semaglutide/rybelsus at that time.

## 2021-10-27 NOTE — PROGRESS NOTES
Chief Complaint   Patient presents with    Dizziness     2 weeks ago    Medication Evaluation     off of Jardiance for 2 weeks     Patient went to [facility] on 10/08/21 and was given Meclizine. Blood sugar was 145 today. 3 most recent PHQ Screens 10/27/2021   Little interest or pleasure in doing things Not at all   Feeling down, depressed, irritable, or hopeless Not at all   Total Score PHQ 2 0     Abuse Screening Questionnaire 10/27/2021   Do you ever feel afraid of your partner? N   Are you in a relationship with someone who physically or mentally threatens you? N   Is it safe for you to go home? Y         Visit Vitals  BP (!) 150/78 (BP 1 Location: Left arm, BP Patient Position: Sitting, BP Cuff Size: Large adult)   Pulse 64   Temp 97.8 °F (36.6 °C) (Oral)   Resp 16   Ht 5' 6\" (1.676 m)   Wt 244 lb 9.6 oz (110.9 kg)   SpO2 99%   BMI 39.48 kg/m²     1. Have you been to the ER, urgent care clinic since your last visit? Hospitalized since your last visit? Yes 10/08/21 [facility]     2. Have you seen or consulted any other health care providers outside of the [address] [place] [address] since your last visit? Include any pap smears or colon screening.  Rupert Ramos

## 2021-11-24 DIAGNOSIS — E11.65 UNCONTROLLED TYPE 2 DIABETES MELLITUS WITH HYPERGLYCEMIA (HCC): ICD-10-CM

## 2021-11-24 RX ORDER — ORAL SEMAGLUTIDE 3 MG/1
TABLET ORAL
Qty: 30 TABLET | Refills: 0 | OUTPATIENT
Start: 2021-11-24

## 2021-11-24 NOTE — TELEPHONE ENCOUNTER
Patient is not taking rybelsus. Stated he had vomiting reaction after 4 days  He is not taking any diabetes medication today. He plans to start a new medication rx from cardiology, but he does not recall the name. Patient thought he was scheduled for an appointment on December 12th, however he is not on the schedule.  I scheduled him for the 17th at 10 am      Madhavi De Los Santos MD

## 2021-12-17 ENCOUNTER — OFFICE VISIT (OUTPATIENT)
Dept: INTERNAL MEDICINE CLINIC | Age: 66
End: 2021-12-17
Payer: MEDICARE

## 2021-12-17 VITALS
OXYGEN SATURATION: 98 % | HEIGHT: 66 IN | BODY MASS INDEX: 38.59 KG/M2 | WEIGHT: 240.13 LBS | SYSTOLIC BLOOD PRESSURE: 145 MMHG | HEART RATE: 91 BPM | DIASTOLIC BLOOD PRESSURE: 85 MMHG | TEMPERATURE: 98.2 F

## 2021-12-17 DIAGNOSIS — I48.91 ATRIAL FIBRILLATION, UNSPECIFIED TYPE (HCC): ICD-10-CM

## 2021-12-17 DIAGNOSIS — E11.21 TYPE 2 DIABETES MELLITUS WITH NEPHROPATHY (HCC): ICD-10-CM

## 2021-12-17 DIAGNOSIS — E66.01 OBESITY, MORBID (HCC): ICD-10-CM

## 2021-12-17 DIAGNOSIS — E11.69 TYPE 2 DIABETES MELLITUS WITH OTHER SPECIFIED COMPLICATION, WITHOUT LONG-TERM CURRENT USE OF INSULIN (HCC): Primary | ICD-10-CM

## 2021-12-17 LAB
ALBUMIN SERPL-MCNC: 3.7 G/DL (ref 3.5–5)
ANION GAP SERPL CALC-SCNC: 6 MMOL/L (ref 5–15)
BUN SERPL-MCNC: 17 MG/DL (ref 6–20)
BUN/CREAT SERPL: 14 (ref 12–20)
CALCIUM SERPL-MCNC: 9 MG/DL (ref 8.5–10.1)
CHLORIDE SERPL-SCNC: 108 MMOL/L (ref 97–108)
CO2 SERPL-SCNC: 26 MMOL/L (ref 21–32)
CREAT SERPL-MCNC: 1.18 MG/DL (ref 0.7–1.3)
GLUCOSE SERPL-MCNC: 172 MG/DL (ref 65–100)
HBA1C MFR BLD HPLC: 6.8 %
PHOSPHATE SERPL-MCNC: 2.9 MG/DL (ref 2.6–4.7)
POTASSIUM SERPL-SCNC: 3.4 MMOL/L (ref 3.5–5.1)
SODIUM SERPL-SCNC: 140 MMOL/L (ref 136–145)

## 2021-12-17 PROCEDURE — G8754 DIAS BP LESS 90: HCPCS | Performed by: INTERNAL MEDICINE

## 2021-12-17 PROCEDURE — G8432 DEP SCR NOT DOC, RNG: HCPCS | Performed by: INTERNAL MEDICINE

## 2021-12-17 PROCEDURE — 3017F COLORECTAL CA SCREEN DOC REV: CPT | Performed by: INTERNAL MEDICINE

## 2021-12-17 PROCEDURE — G8417 CALC BMI ABV UP PARAM F/U: HCPCS | Performed by: INTERNAL MEDICINE

## 2021-12-17 PROCEDURE — G8427 DOCREV CUR MEDS BY ELIG CLIN: HCPCS | Performed by: INTERNAL MEDICINE

## 2021-12-17 PROCEDURE — 99214 OFFICE O/P EST MOD 30 MIN: CPT | Performed by: INTERNAL MEDICINE

## 2021-12-17 PROCEDURE — 2022F DILAT RTA XM EVC RTNOPTHY: CPT | Performed by: INTERNAL MEDICINE

## 2021-12-17 PROCEDURE — G0463 HOSPITAL OUTPT CLINIC VISIT: HCPCS | Performed by: INTERNAL MEDICINE

## 2021-12-17 PROCEDURE — 3044F HG A1C LEVEL LT 7.0%: CPT | Performed by: INTERNAL MEDICINE

## 2021-12-17 PROCEDURE — G8536 NO DOC ELDER MAL SCRN: HCPCS | Performed by: INTERNAL MEDICINE

## 2021-12-17 PROCEDURE — G8753 SYS BP > OR = 140: HCPCS | Performed by: INTERNAL MEDICINE

## 2021-12-17 PROCEDURE — 83036 HEMOGLOBIN GLYCOSYLATED A1C: CPT | Performed by: INTERNAL MEDICINE

## 2021-12-17 PROCEDURE — 1101F PT FALLS ASSESS-DOCD LE1/YR: CPT | Performed by: INTERNAL MEDICINE

## 2021-12-17 RX ORDER — CARVEDILOL 12.5 MG/1
TABLET ORAL
COMMUNITY
Start: 2021-11-17

## 2021-12-17 NOTE — PATIENT INSTRUCTIONS
Blood pressure goal:  - less than 140/85    Trial rybelsus 1 more time. If abdominal cramping or vomiting recurs we will know not to use this class of medication. Stop glipizide when taking rybelsus.

## 2021-12-17 NOTE — PROGRESS NOTES
RM 2    Chief Complaint   Patient presents with    Follow-up     diabetes follow-up       Patient reports muscle strain in lower back. 1. Have you been to the ER, urgent care clinic since your last visit? Hospitalized since your last visit? No    2. Have you seen or consulted any other health care providers outside of the [address] [place] [address] since your last visit? Include any pap smears or colon screening. No    Health Maintenance Due   Topic Date Due    COVID-19 Vaccine (1) Never done    Shingrix Vaccine Age 50> (1 of 2) Never done    Eye Exam Retinal or Dilated  06/12/2019    Medicare Yearly Exam  Never done    Flu Vaccine (1) 09/01/2021     Visit Vitals  BP (!) 145/85 (BP 1 Location: Left arm, BP Patient Position: Sitting)   Pulse 91   Temp 98.2 °F (36.8 °C) (Oral)   Ht 5' 6\" (1.676 m)   Wt 240 lb 2 oz (108.9 kg)   SpO2 98%   BMI 38.76 kg/m²       Learning Assessment 10/27/2021   PRIMARY LEARNER Patient   CO-LEARNER CAREGIVER No   PRIMARY LANGUAGE ENGLISH   LEARNER PREFERENCE PRIMARY DEMONSTRATION   ANSWERED BY patient   RELATIONSHIP SELF         AVS  education, follow up, and recommendations provided and addressed with patient.   services used to advise patient No

## 2021-12-17 NOTE — PROGRESS NOTES
A/P:  Power Cornell is a 77 y.o. male, he presents today for:    1. Type 2 diabetes mellitus with other specified complication, without long-term current use of insulin ([facility])  -     AMB POC HEMOGLOBIN A1C  -     RENAL FUNCTION PANEL; Future  2. Atrial fibrillation, unspecified type ([facility]  3. Type 2 diabetes mellitus with nephropathy ([facility])  4. Obesity, morbid ([facility]    Diabetes: does not tolerate metformin - Gi side efects. Jardiance stopped due to lightheadedness. Currently on glipizide 5mg bid, but having low blood sugars and high morning fasting    - back on glipizide   - trialled rybelsus 3mg - after 6-7 days on medication had an epsidoe of stomach upset. - unclear if abdomina issue was due to rybelsus. Discussed second trial before stopping.      - trialled jardiance and had low potassium  -      Hypokalemia with HTN: following with cardiogy   - new since 2019   - on full dose arb, reduce hctz. Taking potassium 20mg. Patient has not completed covid vaccine. - discussed and provided strong recommendation. Follow-up and Dispositions    · Return in about 2 months (around 2/17/2022) for follow-up daibetes. No future appointments. HPI    Wife is back from rehab. Patient reports he had a reaction to rybelsus - extreme nausa and vomitting. Blood pressure at home:     PMH/PSH: reviewed and updated  Sochx/Famhx: reviewed and updated     All: Allergies   Allergen Reactions    Metformin Rash     Bumps on upper extremities and itching.  Lisinopril Cough    Sulfa (Sulfonamide Antibiotics) Itching    Voltaren [Diclofenac Sodium] Swelling     Med:   Current Outpatient Medications   Medication Sig    meclizine (ANTIVERT) 25 mg tablet     losartan (COZAAR) 100 mg tablet Take 1 Tablet by mouth daily.  amLODIPine (NORVASC) 10 mg tablet Take 1 Tablet by mouth daily.  semaglutide (Rybelsus) 3 mg tablet Take 1 Tablet by mouth Daily (before breakfast).     hydroCHLOROthiazide (HYDRODIURIL) 12.5 mg tablet Take 1 Tablet by mouth daily.  potassium chloride SR (K-TAB) 20 mEq tablet Take 1 Tablet by mouth two (2) times a day.  cyclobenzaprine (FLEXERIL) 10 mg tablet Take 10 mg by mouth as needed.  glipiZIDE (GLUCOTROL) 5 mg tablet TAKE ONE TABLET BY MOUTH TWICE A DAY    atorvastatin (LIPITOR) 10 mg tablet Take 1 Tab by mouth daily.  cetirizine (ZYRTEC) 10 mg tablet Take 10 mg by mouth.  cholecalciferol, vitamin D3, (VITAMIN D3) 2,000 unit tab Take  by mouth daily.  mometasone-formoterol (DULERA) 100-5 mcg/actuation HFA inhaler Take 2 Puffs by inhalation two (2) times a day.  albuterol (PROVENTIL HFA) 90 mcg/actuation inhaler Take 2 Puffs by inhalation every four (4) hours as needed.  docusate sodium (STOOL SOFTENER) 100 mg capsule Take 200 mg by mouth daily.  pyridoxine (VITAMIN B-6) 100 mg tablet Take 100 mg by mouth daily. No current facility-administered medications for this visit. ROS pertinent for the following:  ROS    PE:  Blood pressure (!) 145/85, pulse 91, temperature 98.2 °F (36.8 °C), temperature source Oral, height 5' 6\" (1.676 m), weight 240 lb 2 oz (108.9 kg), SpO2 98 %. Body mass index is 38.76 kg/m². Physical Exam  Vitals and nursing note reviewed. Constitutional:       General: He is not in acute distress. Appearance: Normal appearance. He is well-developed. He is obese. HENT:      Head: Normocephalic and atraumatic. Eyes:      Extraocular Movements: Extraocular movements intact. Conjunctiva/sclera: Conjunctivae normal.      Pupils: Pupils are equal, round, and reactive to light. Cardiovascular:      Rate and Rhythm: Normal rate and regular rhythm. Pulses: Normal pulses. Heart sounds: Normal heart sounds. Pulmonary:      Effort: Pulmonary effort is normal.      Breath sounds: Normal breath sounds. Musculoskeletal:         General: Normal range of motion. Cervical back: Normal range of motion and neck supple. Skin:     General: Skin is warm. Neurological:      General: No focal deficit present. Mental Status: He is alert and oriented to person, place, and time. Labs:   See addendum for interpretation of labs resulting after time of visit. Results for orders placed or performed in visit on 12/17/21   AMB POC HEMOGLOBIN A1C   Result Value Ref Range    Hemoglobin A1c (POC) 6.8 %     He was given AVS and expressed understanding with the diagnosis and plan as discussed. An electronic signature was used to authenticate this note.   -- Enrique Wallace MD

## 2022-01-05 DIAGNOSIS — E87.6 HYPOKALEMIA: ICD-10-CM

## 2022-01-05 RX ORDER — POTASSIUM CHLORIDE 1500 MG/1
20 TABLET, FILM COATED, EXTENDED RELEASE ORAL DAILY
Qty: 90 TABLET | Refills: 1 | Status: SHIPPED | OUTPATIENT
Start: 2022-01-05 | End: 2022-07-08

## 2022-01-17 DIAGNOSIS — E78.01 FAMILIAL HYPERCHOLESTEROLEMIA: Primary | ICD-10-CM

## 2022-01-17 RX ORDER — ATORVASTATIN CALCIUM 10 MG/1
10 TABLET, FILM COATED ORAL DAILY
Qty: 90 TABLET | Refills: 4 | Status: SHIPPED | OUTPATIENT
Start: 2022-01-17 | End: 2022-03-11 | Stop reason: SDUPTHER

## 2022-01-17 NOTE — TELEPHONE ENCOUNTER
Last visit 12/17/2021 MD Sintia Mooney   Next appointment 2 months (02/2022) - Nothing scheduled   Previous refill encounter(s)   01/21/2021 Lipitor #90 with 4 refills. Requested Prescriptions     Pending Prescriptions Disp Refills    atorvastatin (LIPITOR) 10 mg tablet 90 Tablet 0     Sig: Take 1 Tablet by mouth daily.

## 2022-02-27 DIAGNOSIS — I10 ESSENTIAL HYPERTENSION: ICD-10-CM

## 2022-02-28 NOTE — TELEPHONE ENCOUNTER
Last visit 12/17/2021 MD Rosalind Lanza   Next appointment 2 months (02/2021) - Nothing scheduled   Previous refill encounter(s)   09/03/2021 HCTZ #90 with 1 refill,   10/27/2021 Cozaar #90 with 1 refill. Requested Prescriptions     Pending Prescriptions Disp Refills    hydroCHLOROthiazide (HYDRODIURIL) 12.5 mg tablet 90 Tablet 0     Sig: Take 1 Tablet by mouth daily.  losartan (COZAAR) 100 mg tablet 90 Tablet 0     Sig: Take 1 Tablet by mouth daily.

## 2022-03-02 RX ORDER — HYDROCHLOROTHIAZIDE 12.5 MG/1
12.5 TABLET ORAL DAILY
Qty: 90 TABLET | Refills: 0 | Status: SHIPPED | OUTPATIENT
Start: 2022-03-02 | End: 2022-08-17 | Stop reason: SDUPTHER

## 2022-03-02 RX ORDER — LOSARTAN POTASSIUM 100 MG/1
100 TABLET ORAL DAILY
Qty: 90 TABLET | Refills: 0 | Status: SHIPPED | OUTPATIENT
Start: 2022-03-02

## 2022-03-11 DIAGNOSIS — E78.01 FAMILIAL HYPERCHOLESTEROLEMIA: ICD-10-CM

## 2022-03-11 RX ORDER — ATORVASTATIN CALCIUM 10 MG/1
10 TABLET, FILM COATED ORAL DAILY
Qty: 90 TABLET | Refills: 4 | Status: SHIPPED | OUTPATIENT
Start: 2022-03-11

## 2022-03-18 PROBLEM — E87.6 HYPOKALEMIA: Status: ACTIVE | Noted: 2017-03-13

## 2022-03-18 PROBLEM — E66.01 OBESITY, MORBID (HCC): Status: ACTIVE | Noted: 2017-12-15

## 2022-03-19 PROBLEM — E11.21 TYPE 2 DIABETES MELLITUS WITH NEPHROPATHY (HCC): Status: ACTIVE | Noted: 2017-12-15

## 2022-03-19 PROBLEM — Z85.46 HISTORY OF PROSTATE CANCER: Status: ACTIVE | Noted: 2021-08-26

## 2022-03-19 PROBLEM — E78.01 FAMILIAL HYPERCHOLESTEROLEMIA: Status: ACTIVE | Noted: 2017-03-13

## 2022-04-13 ENCOUNTER — TELEPHONE (OUTPATIENT)
Dept: INTERNAL MEDICINE CLINIC | Age: 67
End: 2022-04-13

## 2022-04-13 NOTE — TELEPHONE ENCOUNTER
----- Message from Monica Villarreal sent at 4/12/2022  2:02 PM EDT -----  Subject: Message to Provider    QUESTIONS  Information for Provider? Pharmacy sent over a HIPAA compliant form to   confirm an active patient. They received form back confirming patient is   active but they are still needing notes from patient's last office visit   as they are missing. Please fax notes to [phone]. Attn? Arielle Pinon fax? [phone]   ---------------------------------------------------------------------------  --------------  Wagner CASTRO  What is the best way for the office to contact you? OK to leave message on   voicemail  Preferred Call Back Phone Number? [phone]  ---------------------------------------------------------------------------  --------------  SCRIPT ANSWERS  Relationship to Patient? Third Party  Third Party Type? Pharmacy? Representative Name?  [employer]

## 2022-06-20 NOTE — PROGRESS NOTES
HISTORY OF PRESENT ILLNESS  Nadya Rivera is a 58 y.o. male. HPI   Last here 6/12/17. Pt is here to f/u on chronic conditions.   He presents with pill bottles today - reviewed.       BP today is  135/81  BP at home running around 131/82  Continues losartan 50mg, eplerenone 25mg and --no longer taking norvasc 5mg daily (not in pill bottles today)  He is not sure what happened to the norvasc    BS at home running around 121, 119-120, 57 in the AM fasting, 141 after ice cream, 300 at times as well, no records for review  He denies frequent low BS but occasionally in the 48 ''s   Last visit, I ordered onglyza because januvia 100mg was too expensive   However, he never started onglyza--cost   Continues glipizide SR 2.5mg daily  Recall, metformin caused rashes    Reviewed notes from Dr. Louise Muhammad (ophtho) 7/12/17: f/u in 4 months    Last visit, pt had breast pain  Discussed his aldactone causing breast pain  Since then, I started him on eplerenone - breast pain has been resolved     Pt follows with cardiology cohen for afib  Last saw Dr. Leonides Moon NP in 4/17  Also takes ASA 81mg daily    Continues vit B12, works well    Now on fish oil for dry eyes, works well sx much improved tear ducts no longer clogged     Wt is down 2 lbs since last visit  Pt rides bikes for exercise  Discussed diet and weight loss      Continues 75 mg Zantac prn, works well, no breakthrough  He has not been needing this recently     I treated pt with 50 K units weekly vit D for 8 weeks   He was supposed to start 2000units and is doing this      Continues zocor 10mg daily for cholesterol, tolerating well     Continues to use Pulmicort prn, denies wheezing on this medication  No recent sx has albuterol as well      Pt is followed by Dr. Whit Rosa (sleep)  Compliant with CPAP nightly  Advised him to see he would like new self cleaning mask     Reviewed last labs 6/18  Kidney nl, liver nl, a1c 7.3, hep screen negative  Repeat labs today    Pt follows with Dr. Marylene Rich ([facility]) for trigger finger  Reviewed notes 6/13/17: tendonitis, needs surg  Will be getting operation in near future    Continues on multaq per Dr. Christo Rob, was last in their clinic in April     He was scheduled to see Dr. Marty Nolen for prostate cancer in 5/17, but has not follow up with him. He states he will reschedule the appointment         PREVENTIVE:  Colonoscopy: 1/12/16, Dr. Lance Osman, repeat 5 years  PSA: 3/17 0.4, h/o prostate cancer, Dr. Marty Nolen follows   AAA screen: not needed; never smoker  Tdap: 3/13/2017  Pneumovax: 3/13/2017  [id number]: not yet needed   Zostavax: will give script down the road   Flu shot: 9/15/17  Foot exam: 3/13/2017  Microalbumin: 3/17  A1c: 2011 6.9, 3/17 7.3, 6/17 7.3  Eye exam: Dr. Loiuse Muhammad, 7/12/17  Lipids: 3/17 LDL 44    Patient Active Problem List    Diagnosis Date Noted    Familial hypercholesterolemia 03/13/2017    Hypokalemia 03/13/2017    Prostate cancer ([facility] 03/13/2017    Hypertension 03/05/2011    Diabetes mellitus type 2, uncontrolled ([facility] 03/05/2011    Obesity 03/05/2011    Sleep apnea, obstructive 03/05/2011    Atrial fibrillation (HCC) 03/04/2011     Current Outpatient Prescriptions   Medication Sig Dispense Refill    losartan (COZAAR) 50 mg tablet TAKE ONE TABLET BY MOUTH ONCE DAILY 30 Tab 3    eplerenone (INSPRA) 25 mg tablet Take 1 Tab by mouth daily. 30 Tab 3    glipiZIDE SR (GLUCOTROL XL) 2.5 mg CR tablet Take 1 Tab by mouth daily. 30 Tab 3    dronedarone (MULTAQ) Tab tablet Take 1 Tab by mouth two (2) times daily (with meals). 60 Tab 12    SITagliptin (JANUVIA) 100 mg tablet Take 1 Tab by mouth daily. 30 Tab 3    amLODIPine (NORVASC) 5 mg tablet Take 5 mg by mouth daily.  simvastatin (ZOCOR) 10 mg tablet Take  by mouth nightly.  cetirizine (ZYRTEC) 10 mg tablet Take 1 Tab by mouth daily. 20 Tab 0    budesonide (PULMICORT FLEXHALER) 180 mcg/actuation aepb inhaler Take 2 Puffs by inhalation two (2) times a day.       pramoxine-hydrocortisone (PROTOFOAM-HC) 2.5-1 % topical cream Take as directed up to twice daily 10 g 2    albuterol (PROVENTIL HFA) 90 mcg/actuation inhaler Take  by inhalation.  docusate sodium (STOOL SOFTENER) 100 mg capsule Take 200 mg by mouth daily.  aspirin 81 mg tablet Take 81 mg by mouth daily.  pyridoxine (VITAMIN B-6) 100 mg tablet Take 100 mg by mouth daily. Past Surgical History:   Procedure Laterality Date    CARDIAC CATHETERIZATION      HX ORTHOPAEDIC      right and left knee surgery    HX OTHER SURGICAL      prostate procedure    SD ANESTH,KNEE AREA SURGERY        Lab Results  Component Value Date/Time   WBC 7.1 09/17/2016 11:54 AM   HGB 13.6 09/17/2016 11:54 AM   HCT 40.1 09/17/2016 11:54 AM   PLATELET 005 23/87/2712 11:54 AM   MCV 80.7 09/17/2016 11:54 AM     Lab Results  Component Value Date/Time   Cholesterol, total 115 03/13/2017 10:33 AM   HDL Cholesterol 60 03/13/2017 10:33 AM   LDL, calculated 44 03/13/2017 10:33 AM   Triglyceride 57 03/13/2017 10:33 AM   CHOL/HDL Ratio 1.8 04/27/2011 11:00 AM     Lab Results  Component Value Date/Time   GFR est non-AA 79 06/12/2017 11:11 AM   GFRNA, POC >60 01/04/2013 11:52 AM   GFR est AA 91 06/12/2017 11:11 AM   GFRAA, POC >60 01/04/2013 11:52 AM   Creatinine 1.02 06/12/2017 11:11 AM   Creatinine (POC) 0.9 01/04/2013 11:52 AM   BUN 14 06/12/2017 11:11 AM   Sodium 139 06/12/2017 11:11 AM   Potassium 4.3 06/12/2017 11:11 AM   Chloride 101 06/12/2017 11:11 AM   CO2 21 06/12/2017 11:11 AM   Magnesium 1.9 03/04/2011 04:30 AM          Review of Systems   Respiratory: Negative for shortness of breath. Cardiovascular: Negative for chest pain. Physical Exam   Constitutional: He is oriented to person, place, and time. He appears well-developed and well-nourished. No distress. HENT:   Head: Normocephalic and atraumatic. Mouth/Throat: Oropharynx is clear and moist. No oropharyngeal exudate.    Eyes: Conjunctivae and EOM are normal. Right eye exhibits no discharge. Left eye exhibits no discharge. Neck: Normal range of motion. Neck supple. No thyromegaly present. Cardiovascular: Normal rate, regular rhythm, normal heart sounds and intact distal pulses. Exam reveals no gallop and no friction rub. No murmur heard. Pulmonary/Chest: Effort normal and breath sounds normal. No respiratory distress. He has no wheezes. He has no rales. He exhibits no tenderness. Musculoskeletal: Normal range of motion. He exhibits edema (2+ pitting BLE). He exhibits no tenderness or deformity. Lymphadenopathy:     He has no cervical adenopathy. Neurological: He is alert and oriented to person, place, and time. He has normal reflexes. He exhibits normal muscle tone. Coordination normal.   Skin: Skin is warm and dry. No rash noted. He is not diaphoretic. No erythema. No pallor. Psychiatric: He has a normal mood and affect. His behavior is normal.       ASSESSMENT and PLAN    ICD-10-CM ICD-9-CM    1. Uncontrolled type 2 diabetes mellitus without complication, without long-term current use of insulin (HCC)    Pt is on glipizde 2.5mg daily, he did not start DPP4 inhibitor d/t costs, he had allergic rxn to meformin, hives    For now continue glipizide 2.5mg,    i suspect this will need to be increased to 5mg, home BS fluctuate quite a bit, wt needs to improve, discussed need for continued w/l, once a1c is back, will adjust meds further, cost is an issue with meds W82.54 848.38 METABOLIC PANEL, BASIC      HEMOGLOBIN A1C WITH EAG   2. Paroxysmal atrial fibrillation (HCC)    Continues on multaq per Dr. Mario Dale, was last in their clinic in April   I48.0 427.31    3. Familial hypercholesterolemia    Continue zocor 10mg daily   E78.01 272.0    4. Sleep apnea, obstructive    Compliant with CPAP, would like a new machine, will contact Dr. Lili Fontenot office for this   G47.33 327.23    5.  Essential hypertension    Pt is currently just taking losartan 50 and eplerenone, BP adequately controlled    Previously was on norvasc, no longer taking it, unclear why, was also on aldactone, given adequately control will continue current regimen, no change, may need to increase losartan, will check BMP to ensure K levels are alright    Of note, pt was previously on klor-con, no longer taking this   I10 401.9    6. Morbid obesity due to excess calories ([facility]    Counseled on diet and w/l   E66.01 278.01    7. Gynecomastia    Resolved with change of aldactone to eplerenone   N62 611.1    8. Trigger finger, unspecified finger, unspecified laterality    Will be getting this operated on by Dr. Maria Del Carmen Dewey   M65.30 727.03    9. BMI 40.0-44.9, adult ([facility]    See above   Z68.41 V85.41    10. Encounter for immunization    Flu shot Z23 V03.89 INFLUENZA VIRUS VAC QUAD,SPLIT,PRESV FREE SYRINGE IM        Scribed by Oswaldo Miller of [address] [place] [address] [address], as dictated by Dr. Geraldine Hall. Current diagnosis and concerns discussed with pt at length. Understands risks and benefits or current treatment plan and medications and accepts the treatment and medication with any possible risks. Pt asks appropriate questions which were answered. Pt instructed to call with any concerns or problems. This note will not be viewable in [address] [place], C[place]

## 2022-08-10 DIAGNOSIS — E87.6 HYPOKALEMIA: ICD-10-CM

## 2022-08-10 RX ORDER — POTASSIUM CHLORIDE 1500 MG/1
TABLET, FILM COATED, EXTENDED RELEASE ORAL
Qty: 30 TABLET | Refills: 0 | Status: SHIPPED | OUTPATIENT
Start: 2022-08-10 | End: 2022-08-17 | Stop reason: SDUPTHER

## 2022-08-17 ENCOUNTER — OFFICE VISIT (OUTPATIENT)
Dept: INTERNAL MEDICINE CLINIC | Age: 67
End: 2022-08-17
Payer: MEDICARE

## 2022-08-17 VITALS
TEMPERATURE: 98 F | RESPIRATION RATE: 16 BRPM | DIASTOLIC BLOOD PRESSURE: 78 MMHG | WEIGHT: 240 LBS | OXYGEN SATURATION: 97 % | HEIGHT: 66 IN | SYSTOLIC BLOOD PRESSURE: 145 MMHG | BODY MASS INDEX: 38.57 KG/M2 | HEART RATE: 82 BPM

## 2022-08-17 DIAGNOSIS — H57.89 REDNESS OF BOTH EYES: ICD-10-CM

## 2022-08-17 DIAGNOSIS — R68.89 LIGHT SENSITIVITY: ICD-10-CM

## 2022-08-17 DIAGNOSIS — E87.6 HYPOKALEMIA: ICD-10-CM

## 2022-08-17 DIAGNOSIS — H57.11 ACUTE RIGHT EYE PAIN: ICD-10-CM

## 2022-08-17 DIAGNOSIS — E11.8 CONTROLLED TYPE 2 DIABETES MELLITUS WITH COMPLICATION, WITHOUT LONG-TERM CURRENT USE OF INSULIN (HCC): ICD-10-CM

## 2022-08-17 DIAGNOSIS — I10 ESSENTIAL HYPERTENSION: ICD-10-CM

## 2022-08-17 DIAGNOSIS — E11.21 TYPE 2 DIABETES MELLITUS WITH NEPHROPATHY (HCC): Primary | ICD-10-CM

## 2022-08-17 DIAGNOSIS — E66.01 SEVERE OBESITY (BMI 35.0-39.9) WITH COMORBIDITY (HCC): ICD-10-CM

## 2022-08-17 DIAGNOSIS — I48.91 ATRIAL FIBRILLATION, UNSPECIFIED TYPE (HCC): ICD-10-CM

## 2022-08-17 DIAGNOSIS — R42 VERTIGO: ICD-10-CM

## 2022-08-17 LAB
ALBUMIN UR QL STRIP: 150 MG/L
CREATININE, URINE POC: 200 MG/DL
HBA1C MFR BLD HPLC: 7.4 % (ref 4.8–5.6)
MICROALBUMIN/CREAT RATIO POC: NORMAL MG/G

## 2022-08-17 PROCEDURE — 83036 HEMOGLOBIN GLYCOSYLATED A1C: CPT | Performed by: INTERNAL MEDICINE

## 2022-08-17 PROCEDURE — G0463 HOSPITAL OUTPT CLINIC VISIT: HCPCS | Performed by: INTERNAL MEDICINE

## 2022-08-17 PROCEDURE — G8753 SYS BP > OR = 140: HCPCS | Performed by: INTERNAL MEDICINE

## 2022-08-17 PROCEDURE — 1101F PT FALLS ASSESS-DOCD LE1/YR: CPT | Performed by: INTERNAL MEDICINE

## 2022-08-17 PROCEDURE — 3017F COLORECTAL CA SCREEN DOC REV: CPT | Performed by: INTERNAL MEDICINE

## 2022-08-17 PROCEDURE — 82044 UR ALBUMIN SEMIQUANTITATIVE: CPT | Performed by: INTERNAL MEDICINE

## 2022-08-17 PROCEDURE — G8754 DIAS BP LESS 90: HCPCS | Performed by: INTERNAL MEDICINE

## 2022-08-17 PROCEDURE — G8536 NO DOC ELDER MAL SCRN: HCPCS | Performed by: INTERNAL MEDICINE

## 2022-08-17 PROCEDURE — G8427 DOCREV CUR MEDS BY ELIG CLIN: HCPCS | Performed by: INTERNAL MEDICINE

## 2022-08-17 PROCEDURE — 3051F HG A1C>EQUAL 7.0%<8.0%: CPT | Performed by: INTERNAL MEDICINE

## 2022-08-17 PROCEDURE — G8510 SCR DEP NEG, NO PLAN REQD: HCPCS | Performed by: INTERNAL MEDICINE

## 2022-08-17 PROCEDURE — 2022F DILAT RTA XM EVC RTNOPTHY: CPT | Performed by: INTERNAL MEDICINE

## 2022-08-17 PROCEDURE — 99214 OFFICE O/P EST MOD 30 MIN: CPT | Performed by: INTERNAL MEDICINE

## 2022-08-17 PROCEDURE — 1123F ACP DISCUSS/DSCN MKR DOCD: CPT | Performed by: INTERNAL MEDICINE

## 2022-08-17 PROCEDURE — G8417 CALC BMI ABV UP PARAM F/U: HCPCS | Performed by: INTERNAL MEDICINE

## 2022-08-17 RX ORDER — POTASSIUM CHLORIDE 1500 MG/1
20 TABLET, FILM COATED, EXTENDED RELEASE ORAL DAILY
Qty: 90 TABLET | Refills: 1 | Status: SHIPPED | OUTPATIENT
Start: 2022-08-17

## 2022-08-17 RX ORDER — HYDROCHLOROTHIAZIDE 12.5 MG/1
12.5 TABLET ORAL DAILY
Qty: 90 TABLET | Refills: 1 | Status: SHIPPED | OUTPATIENT
Start: 2022-08-17

## 2022-08-17 RX ORDER — GLIPIZIDE 5 MG/1
5 TABLET ORAL 2 TIMES DAILY
Qty: 180 TABLET | Refills: 1 | Status: SHIPPED | OUTPATIENT
Start: 2022-08-17

## 2022-08-17 RX ORDER — AMLODIPINE BESYLATE 10 MG/1
10 TABLET ORAL DAILY
Qty: 90 TABLET | Refills: 1 | Status: SHIPPED | OUTPATIENT
Start: 2022-08-17

## 2022-08-17 NOTE — PROGRESS NOTES
A/P:  Nathen Trivedi is a 79 y.o. male, he presents today for:    1. Type 2 diabetes mellitus with nephropathy (HCC)  -     AMB POC HEMOGLOBIN K3S  -     METABOLIC PANEL, COMPREHENSIVE; Future  -     LIPID PANEL; Future  -     CBC W/O DIFF; Future  -     MAGNESIUM; Future  -     HM DIABETES FOOT EXAM  -     AMB POC URINE, MICROALBUMIN, SEMIQUANT (3 RESULTS)  2. Atrial fibrillation, unspecified type (HCC)  -     METABOLIC PANEL, COMPREHENSIVE; Future  -     LIPID PANEL; Future  -     CBC W/O DIFF; Future  -     MAGNESIUM; Future  3. Vertigo  -     REFERRAL TO ENT-OTOLARYNGOLOGY  4. Severe obesity (BMI 35.0-39. 9) with comorbidity (HCC)  5. Redness of both eyes  6. Light sensitivity  7. Acute right eye pain    Diabetes: does not tolerate metformin - GI side efects. Jardiance stopped due to lightheadedness/ hypokalemia. Rybelsus stopped due to GI side effects.    -  Currently on glipizide 5mg bid.   - discussed today continuing current medication. With consideration to resume jardiance or other SGLT-2 in the future     Hypokalemia with HTN: BP not well controlled today - but missed AM medications and having acute pain    - follow-up labs requested today   - notable lower extremity edema    History of atrial fib - pt reports cardiology stopped medications - no recent events. - not on ASA - unable to find history of arterial disease as prior diagnosis. Vertigo episodes - self resolving - lasting < 5 minutes, new in past month. [place] - agree with referral to ENT. Acute eye pain with light sensitivity and redness - concern for acute glaucoma or infalmmatory eye disease. - called [facility] and patient to be seen this afternoon. Follow-up and Dispositions    Return in about 3 months (around 11/17/2022) for establish care. HPI  79year old man with history of Daibetes, CKD, lipid. - wife has been in poor health - has come back home with home health care.   - needs assistance with transfers - currently not able to walk. Cardiologist - Dr. Canela April - stopped blood thinner - because he has not had afib in a long time. Not currently on aspirin    PMH/PSH: reviewed and updated  Sochx/Famhx: reviewed and updated     All: Allergies   Allergen Reactions    Metformin Rash     Bumps on upper extremities and itching. Lisinopril Cough    Sulfa (Sulfonamide Antibiotics) Itching    Voltaren [Diclofenac Sodium] Swelling     Med:   Current Outpatient Medications   Medication Sig    potassium chloride SR (K-TAB) 20 mEq tablet TAKE ONE TABLET BY MOUTH DAILY    atorvastatin (LIPITOR) 10 mg tablet Take 1 Tablet by mouth daily. hydroCHLOROthiazide (HYDRODIURIL) 12.5 mg tablet Take 1 Tablet by mouth daily. losartan (COZAAR) 100 mg tablet Take 1 Tablet by mouth daily. carvediloL (COREG) 12.5 mg tablet     meclizine (ANTIVERT) 25 mg tablet     amLODIPine (NORVASC) 10 mg tablet Take 1 Tablet by mouth daily. cyclobenzaprine (FLEXERIL) 10 mg tablet Take 10 mg by mouth as needed. glipiZIDE (GLUCOTROL) 5 mg tablet TAKE ONE TABLET BY MOUTH TWICE A DAY    cetirizine (ZYRTEC) 10 mg tablet Take 10 mg by mouth. cholecalciferol, vitamin D3, 50 mcg (2,000 unit) tab Take  by mouth daily. mometasone-formoterol (DULERA) 100-5 mcg/actuation HFA inhaler Take 2 Puffs by inhalation two (2) times a day. albuterol (PROVENTIL HFA, VENTOLIN HFA, PROAIR HFA) 90 mcg/actuation inhaler Take 2 Puffs by inhalation every four (4) hours as needed. docusate sodium (COLACE) 100 mg capsule Take 200 mg by mouth daily. pyridoxine, vitamin B6, (VITAMIN B-6) 100 mg tablet Take 100 mg by mouth daily. No current facility-administered medications for this visit. ROS pertinent for the following:  ROS    PE:  Pulse 82, temperature 98 °F (36.7 °C), temperature source Oral, resp. rate 16, height 5' 6\" (1.676 m), weight 240 lb (108.9 kg), SpO2 97 %. Body mass index is 38.74 kg/m².   Physical Exam  Vitals and nursing note reviewed. Constitutional:       General: He is not in acute distress. Appearance: Normal appearance. He is well-developed. HENT:      Head: Normocephalic and atraumatic. Eyes:      Extraocular Movements: Extraocular movements intact. Pupils: Pupils are equal, round, and reactive to light. Comments: Bilateral injection right eye worse than left   Mild tearing. Sensitive to light. Restricted pupillary reflex. Cardiovascular:      Rate and Rhythm: Normal rate and regular rhythm. Pulses: Normal pulses. Heart sounds: Normal heart sounds. Pulmonary:      Effort: Pulmonary effort is normal.      Breath sounds: Normal breath sounds. Abdominal:      Comments: obese   Musculoskeletal:         General: Normal range of motion. Cervical back: Normal range of motion and neck supple. Right lower leg: Edema (2+ bilatearlly) present. Left lower leg: Edema present. Skin:     General: Skin is warm. Neurological:      General: No focal deficit present. Mental Status: He is alert and oriented to person, place, and time. Diabetic foot exam:     Left/Right:   Filament test reduced sensation with micro filament   Pulse DP: 2+ (normal)   Deformities: None    Labs:   See addendum for interpretation of labs resulting after time of visit. Results for orders placed or performed in visit on 08/17/22   AMB POC HEMOGLOBIN A1C   Result Value Ref Range    Hemoglobin A1c (POC) 7.4 (A) 4.8 - 5.6 %   AMB POC URINE, MICROALBUMIN, SEMIQUANT (3 RESULTS)   Result Value Ref Range    ALBUMIN, URINE  Negative mg/L    CREATININE, URINE  mg/dL    Microalbumin/creat ratio (POC)  <30 MG/G     He was given AVS and expressed understanding with the diagnosis and plan as discussed. An electronic signature was used to authenticate this note.   -- Renate Wu MD

## 2022-08-17 NOTE — PROGRESS NOTES
RM 1    Chief Complaint   Patient presents with    Diabetes     Follow-up    Dizziness    Eye Problem     Eye pain and sensitive to light       Visit Vitals  Pulse 82   Temp 98 °F (36.7 °C) (Oral)   Resp 16   Ht 5' 6\" (1.676 m)   Wt 240 lb (108.9 kg)   SpO2 97%   BMI 38.74 kg/m²       3 most recent PHQ Screens 10/27/2021   Little interest or pleasure in doing things Not at all   Feeling down, depressed, irritable, or hopeless Not at all   Total Score PHQ 2 0         1. Have you been to the ER, urgent care clinic since your last visit? Hospitalized since your last visit? Yes seen in ER 8/12/22    2. Have you seen or consulted any other health care providers outside of the [address] [place] [address] since your last visit? Include any pap smears or colon screening. No    Health Maintenance Due   Topic Date Due    COVID-19 Vaccine (1) Never done    Shingrix Vaccine Age 50> (1 of 2) Never done    Pneumococcal 65+ years (2 - PCV) 03/13/2018    Eye Exam Retinal or Dilated  06/12/2019    Medicare Yearly Exam  Never done    Foot Exam Q1  08/17/2022    MICROALBUMIN Q1  08/17/2022    Lipid Screen  08/17/2022       Learning Assessment 10/27/2021   PRIMARY LEARNER Patient   CO-LEARNER CAREGIVER No   PRIMARY LANGUAGE ENGLISH   LEARNER PREFERENCE PRIMARY DEMONSTRATION   ANSWERED BY patient   RELATIONSHIP SELF       AVS  education, follow up, and recommendations provided and addressed with patient.  services used to advise patient. No

## 2022-08-17 NOTE — PATIENT INSTRUCTIONS
Meet with vertigo    See below locations of colleagues in the [facility] group that have availability for establishing care.     -----[place] [facility]-----    113 Avelina Swift NP   - Jackie Grayson NP    [facility]  [facility]  [place], [address]  Tel: [phone] Internal Medicine (18 and older only)   - Justa Guo MD   - [facility] NP   - Saumya Campuzano NP    [facility] [place], [address] [facility]. [address], [facility]  Tel: [facility],Fourth Floor practice   - Janell Grimm MD   - Oliverio Guzman MD - special interest in women's health    [facility], [address]  Tel: [phone]      -----[place] and Herschell Jeans------    [address] center on [address] [facility] MD Ziggy - special interest in obesity medicine    [address] [address]  Tel [phone]    Jack Higgins 69. center at [address]vo MD    [address] [facility], [facility] [facility]  [address] [address]  Tel [phone]    ------[place] and [facility]-----    [address] - ages 11 and older   - Indy Hernandez MD      [id number] [facility]

## 2022-08-18 LAB
ALBUMIN SERPL-MCNC: 3.2 G/DL (ref 3.5–5)
ALBUMIN/GLOB SERPL: 0.9 {RATIO} (ref 1.1–2.2)
ALP SERPL-CCNC: 108 U/L (ref 45–117)
ALT SERPL-CCNC: 32 U/L (ref 12–78)
ANION GAP SERPL CALC-SCNC: 7 MMOL/L (ref 5–15)
AST SERPL-CCNC: 22 U/L (ref 15–37)
BILIRUB SERPL-MCNC: 0.7 MG/DL (ref 0.2–1)
BUN SERPL-MCNC: 18 MG/DL (ref 6–20)
BUN/CREAT SERPL: 14 (ref 12–20)
CALCIUM SERPL-MCNC: 8.8 MG/DL (ref 8.5–10.1)
CHLORIDE SERPL-SCNC: 110 MMOL/L (ref 97–108)
CHOLEST SERPL-MCNC: 119 MG/DL
CO2 SERPL-SCNC: 24 MMOL/L (ref 21–32)
CREAT SERPL-MCNC: 1.33 MG/DL (ref 0.7–1.3)
ERYTHROCYTE [DISTWIDTH] IN BLOOD BY AUTOMATED COUNT: 17.1 % (ref 11.5–14.5)
GLOBULIN SER CALC-MCNC: 3.6 G/DL (ref 2–4)
GLUCOSE SERPL-MCNC: 227 MG/DL (ref 65–100)
HCT VFR BLD AUTO: 44.7 % (ref 36.6–50.3)
HDLC SERPL-MCNC: 61 MG/DL
HDLC SERPL: 2 {RATIO} (ref 0–5)
HGB BLD-MCNC: 13.9 G/DL (ref 12.1–17)
LDLC SERPL CALC-MCNC: 49.2 MG/DL (ref 0–100)
MAGNESIUM SERPL-MCNC: 2.4 MG/DL (ref 1.6–2.4)
MCH RBC QN AUTO: 26.9 PG (ref 26–34)
MCHC RBC AUTO-ENTMCNC: 31.1 G/DL (ref 30–36.5)
MCV RBC AUTO: 86.6 FL (ref 80–99)
NRBC # BLD: 0 K/UL (ref 0–0.01)
NRBC BLD-RTO: 0 PER 100 WBC
PLATELET # BLD AUTO: 227 K/UL (ref 150–400)
PMV BLD AUTO: 12.5 FL (ref 8.9–12.9)
POTASSIUM SERPL-SCNC: 4.1 MMOL/L (ref 3.5–5.1)
PROT SERPL-MCNC: 6.8 G/DL (ref 6.4–8.2)
RBC # BLD AUTO: 5.16 M/UL (ref 4.1–5.7)
SODIUM SERPL-SCNC: 141 MMOL/L (ref 136–145)
TRIGL SERPL-MCNC: 44 MG/DL (ref ?–150)
VLDLC SERPL CALC-MCNC: 8.8 MG/DL
WBC # BLD AUTO: 10.5 K/UL (ref 4.1–11.1)

## 2022-08-24 ENCOUNTER — TELEPHONE (OUTPATIENT)
Dept: INTERNAL MEDICINE CLINIC | Age: 67
End: 2022-08-24

## 2022-08-24 NOTE — TELEPHONE ENCOUNTER
----- Message from Mattie Dials sent at 8/17/2022  1:03 PM EDT -----  Subject: Message to Provider    QUESTIONS  Information for Provider? Ethan from [employer] on fax sent on 8/15   for a Freestyle Britni glucose monitor-no answer at [facility]; please call them   back  ---------------------------------------------------------------------------  --------------  [address]  [phone]; OK to leave message on voicemail  ---------------------------------------------------------------------------  --------------  SCRIPT ANSWERS  Relationship to Patient? Third Party  Third Party Type? Pharmacy? Representative Name?  Dana Chua

## 2022-09-21 ENCOUNTER — DOCUMENTATION ONLY (OUTPATIENT)
Dept: INTERNAL MEDICINE CLINIC | Age: 67
End: 2022-09-21

## 2022-09-21 NOTE — PROGRESS NOTES
FreeStyle Britni 2, [employer] Detailed Written Order, Provider signed 8/24/2022, Faxed 9/7/2022 to [phone], Confirmation scanned in [facility]

## 2022-10-11 ENCOUNTER — TELEPHONE (OUTPATIENT)
Dept: SLEEP MEDICINE | Age: 67
End: 2022-10-11

## 2022-11-02 ENCOUNTER — TRANSCRIBE ORDER (OUTPATIENT)
Dept: SCHEDULING | Age: 67
End: 2022-11-02

## 2022-11-02 DIAGNOSIS — S93.402S SPRAIN OF LIGAMENT OF LEFT ANKLE, SEQUELA: Primary | ICD-10-CM

## 2022-11-06 ENCOUNTER — HOSPITAL ENCOUNTER (OUTPATIENT)
Dept: MRI IMAGING | Age: 67
Discharge: HOME | End: 2022-11-06
Attending: PODIATRIST
Payer: MEDICARE

## 2022-11-06 DIAGNOSIS — S93.402S SPRAIN OF LIGAMENT OF LEFT ANKLE, SEQUELA: ICD-10-CM

## 2022-11-06 PROCEDURE — 73721 MRI JNT OF LWR EXTRE W/O DYE: CPT

## 2022-11-08 ENCOUNTER — DOCUMENTATION ONLY (OUTPATIENT)
Dept: SLEEP MEDICINE | Age: 67
End: 2022-11-08

## 2022-11-08 ENCOUNTER — OFFICE VISIT (OUTPATIENT)
Dept: SLEEP MEDICINE | Age: 67
End: 2022-11-08
Payer: MEDICARE

## 2022-11-08 VITALS
WEIGHT: 240.7 LBS | BODY MASS INDEX: 38.68 KG/M2 | SYSTOLIC BLOOD PRESSURE: 131 MMHG | HEART RATE: 64 BPM | DIASTOLIC BLOOD PRESSURE: 74 MMHG | HEIGHT: 66 IN | OXYGEN SATURATION: 99 %

## 2022-11-08 DIAGNOSIS — G47.33 OSA (OBSTRUCTIVE SLEEP APNEA): Primary | ICD-10-CM

## 2022-11-08 PROCEDURE — G8417 CALC BMI ABV UP PARAM F/U: HCPCS | Performed by: SPECIALIST

## 2022-11-08 PROCEDURE — G8754 DIAS BP LESS 90: HCPCS | Performed by: SPECIALIST

## 2022-11-08 PROCEDURE — 1123F ACP DISCUSS/DSCN MKR DOCD: CPT | Performed by: SPECIALIST

## 2022-11-08 PROCEDURE — 99213 OFFICE O/P EST LOW 20 MIN: CPT | Performed by: SPECIALIST

## 2022-11-08 PROCEDURE — 3078F DIAST BP <80 MM HG: CPT | Performed by: SPECIALIST

## 2022-11-08 PROCEDURE — G8427 DOCREV CUR MEDS BY ELIG CLIN: HCPCS | Performed by: SPECIALIST

## 2022-11-08 PROCEDURE — 3074F SYST BP LT 130 MM HG: CPT | Performed by: SPECIALIST

## 2022-11-08 PROCEDURE — G8432 DEP SCR NOT DOC, RNG: HCPCS | Performed by: SPECIALIST

## 2022-11-08 PROCEDURE — G8536 NO DOC ELDER MAL SCRN: HCPCS | Performed by: SPECIALIST

## 2022-11-08 PROCEDURE — 1101F PT FALLS ASSESS-DOCD LE1/YR: CPT | Performed by: SPECIALIST

## 2022-11-08 PROCEDURE — 3017F COLORECTAL CA SCREEN DOC REV: CPT | Performed by: SPECIALIST

## 2022-11-08 PROCEDURE — G8752 SYS BP LESS 140: HCPCS | Performed by: SPECIALIST

## 2022-11-08 NOTE — PROGRESS NOTES
217 [facility] [place]. [place], [address]  Tel.  [phone]  Fax. 100 [facility] 60  [place], [address] [facility]  Tel.  [phone]  Fax. [phone] [address] [facility]   Tel.  [phone]  Fax. [phone]         Chief Complaint       Chief Complaint   Patient presents with    Sleep Problem     Yearly follow up         HPI        Saundra Thomson is a 79 y.o. male seen for follow-up. He underwent an evaluation at sleep disorders [place] [address] in 2007. He was diagnosed with sleep apnea and started on CPAP at that time. He states that he was seen once in follow-up. He has continued on the same unit. Download data not available. Reevaluation: 164 events occurred of which 73 were hypopnea and 91 apnea (6 central, 85 obstructive). Minimal SaO2 82%. Severe snoring noted. Overall /h. Patient was supine. During the second portion of the study CPAP and BiPAP were employed. 119 respiratory events occurred of which 33 were hypopnea and 86 apnea (54 central, 32 obstructive). The overall AHI was 38.9/h; the overall REM related AHI of 61.4/h. Minimal SaO2 86%. At BiPAP of 22/ 16  cm: 32.5 minutes were recorded of which 32 minutes spent asleep and 7 minutes in rem. Corresponding AHI of 3.8/h. Compliance data downloaded and reviewed in detail with the patient today. During the past 30 days, BIPAP used during 30 days with the average daily use of 6.3 hours. CMS compliance criteria 100%. AHI 0.6 per hour. He is doing well without significant nocturnal awakening, nonrestorative sleep or excessive daytime sleepiness. [place] Sleepiness Scale: 6    Allergies   Allergen Reactions    Metformin Rash     Bumps on upper extremities and itching.      Lisinopril Cough    Sulfa (Sulfonamide Antibiotics) Itching    Voltaren [Diclofenac Sodium] Swelling       Current Outpatient Medications   Medication Sig Dispense Refill    potassium chloride SR (K-TAB) 20 mEq tablet Take 1 Tablet by mouth daily. 90 Tablet 1    hydroCHLOROthiazide (HYDRODIURIL) 12.5 mg tablet Take 1 Tablet by mouth daily. 90 Tablet 1    amLODIPine (NORVASC) 10 mg tablet Take 1 Tablet by mouth daily. 90 Tablet 1    glipiZIDE (GLUCOTROL) 5 mg tablet Take 1 Tablet by mouth two (2) times a day. 180 Tablet 1    atorvastatin (LIPITOR) 10 mg tablet Take 1 Tablet by mouth daily. 90 Tablet 4    losartan (COZAAR) 100 mg tablet Take 1 Tablet by mouth daily. 90 Tablet 0    carvediloL (COREG) 12.5 mg tablet       meclizine (ANTIVERT) 25 mg tablet       cyclobenzaprine (FLEXERIL) 10 mg tablet Take 10 mg by mouth as needed. cetirizine (ZYRTEC) 10 mg tablet Take 10 mg by mouth. cholecalciferol, vitamin D3, 50 mcg (2,000 unit) tab Take  by mouth daily. mometasone-formoterol (DULERA) 100-5 mcg/actuation HFA inhaler Take 2 Puffs by inhalation two (2) times a day. 1 Inhaler 3    albuterol (PROVENTIL HFA, VENTOLIN HFA, PROAIR HFA) 90 mcg/actuation inhaler Take 2 Puffs by inhalation every four (4) hours as needed. docusate sodium (COLACE) 100 mg capsule Take 100 mg by mouth daily. pyridoxine, vitamin B6, (VITAMIN B-6) 100 mg tablet Take 100 mg by mouth daily. He  has a past medical history of Asthma, Atrial fibrillation ([facility] [address] CAD (coronary artery disease), Cancer ([facility] [address] (2013), Diabetes [facility]), ED (erectile dysfunction), Enlarged heart, Hypertension, and Sleep apnea. He  has a past surgical history that includes pr anesth,knee area surgery; cardiac catheterization; hx other surgical; hx orthopaedic; and colonoscopy (N/A, 7/19/2019). He family history includes Alcohol abuse in his mother; Diabetes in his father, mother, and sister; Heart Disease in his father and mother; Hypertension in his father and mother; Stroke in his brother and sister. He  reports that he has never smoked.  He has never used smokeless tobacco. He reports that he does not drink alcohol and does not use drugs. Review of Systems:  Unchanged per patient      Objective:   Visit Vitals  /74 (BP 1 Location: Left upper arm, BP Patient Position: Sitting)   Pulse 64   Ht 5' 6\" (1.676 m)   Wt 240 lb 11.2 oz (109.2 kg)   SpO2 99%   BMI 38.85 kg/m²     Body mass index is 38.85 kg/m². General:   Conversant, cooperative   Eyes:  no nystagmus   Oropharynx:   Mallampati score IV,  tongue mildly scalloped, uvula prominent            Chest/Lungs:  Clear on auscultation    CVS:  Normal rate, regular rhythm        Neuro:  Speech fluent, face symmetrical             Assessment:       ICD-10-CM ICD-9-CM    1. ROBERT (obstructive sleep apnea)  G47.33 327.23 AMB SUPPLY ORDER          he is compliant with PAP therapy and PAP continues to benefit patient and remains necessary for control of his sleep apnea. Plan:     Orders Placed This Encounter    AMB SUPPLY ORDER     Diagnosis: Obstructive Sleep Apnea ICD-10 Code (G47.33)    CPAP mask and supplies-  Patient preference, headgear, heated tubing, and filter;  heated humidifier.  Oral/Nasal Combo Mask 1 every 3 months.  Oral Cushion Combo Mask (Replace) 2 per month.  Nasal Pillows Combo Mask (Replace) 2 per month.  Full Face Mask 1 every 3 months.  Full Face Mask Cushion 1 per month.  Nasal Cushion (Replace) 2 per month.  Nasal Pillows (Replace) 2 per month.  Nasal Interface Mask 1 every 3 months.  Headgear 1 every 6 months.  Chinstrap 1 every 6 months.  Tubing 1 every 3 months.  Filter(s) Disposable 2 per month.  Filter(s) Non-Disposable 1 every 6 months.  Oral Interface 1 every 3 months. [address] [facility] for Taryn Pham (Replace) 1 every 6 months.  Tubing with heating element 1 every 3 months.                  Kacie Helton MD, [facility] Master  Diplomate, [address] [place] [address] Board of Sleep Medicine  NPI [id number]  Electronically signed  11/8/22       *A copy of compliance data was provided to the patient and reviewed in detail. *CPAP will be continued at the above pressure settings. The patient is to contact the office if there are problems with either mask or pressure settings. Follow-up will be scheduled at which time compliance data will be reviewed. * Patient has a history and examination consistent with the diagnosis of sleep apnea. * He was provided information on sleep apnea including corresponding risk factors and the importance of proper treatment. * Treatment options if indicated were reviewed today. Potential benefit of weight reduction       Cherelle De Souza MD, FAA  Electronically signed 11/08/22        This note was created using voice recognition software. Despite editing, there may be syntax errors. This note will not be viewable in [address]

## 2022-11-10 ENCOUNTER — DOCUMENTATION ONLY (OUTPATIENT)
Dept: SLEEP MEDICINE | Age: 67
End: 2022-11-10

## 2022-12-20 DIAGNOSIS — I10 ESSENTIAL HYPERTENSION: ICD-10-CM

## 2022-12-20 RX ORDER — LOSARTAN POTASSIUM 100 MG/1
100 TABLET ORAL DAILY
Qty: 90 TABLET | Refills: 0 | Status: SHIPPED | OUTPATIENT
Start: 2022-12-20

## 2022-12-20 NOTE — TELEPHONE ENCOUNTER
Last visit 08/17/2022 MD Emili Layne   Next appointment Nothing scheduled   Previous refill encounter(s)   03/02/2022 Cozaar #90   For Pharmacy Admin Tracking Only    Program: Medication Refill  Intervention Detail: New Rx: 1, reason: Patient Preference  Time Spent (min): 5        Requested Prescriptions     Pending Prescriptions Disp Refills    losartan (COZAAR) 100 mg tablet 90 Tablet 0     Sig: Take 1 Tablet by mouth daily.

## 2023-02-13 DIAGNOSIS — I10 ESSENTIAL HYPERTENSION: ICD-10-CM

## 2023-02-13 NOTE — TELEPHONE ENCOUNTER
Last visit 08/17/2022 MD Heddy Aase   Next appointment Nothing scheduled   Previous refill encounter(s)   08/17/2022:  - Norvasc #90 with 1 refill,   - HCTZ #90 with 1 refill. For Pharmacy Admin Tracking Only    Program: Medication Refill  Intervention Detail: New Rx: 2, reason: Patient Preference  Time Spent (min): 5      Requested Prescriptions     Pending Prescriptions Disp Refills    amLODIPine (NORVASC) 10 mg tablet 90 Tablet 0     Sig: Take 1 Tablet by mouth daily. hydroCHLOROthiazide (HYDRODIURIL) 12.5 mg tablet 90 Tablet 0     Sig: Take 1 Tablet by mouth daily.

## 2023-02-15 RX ORDER — HYDROCHLOROTHIAZIDE 12.5 MG/1
12.5 TABLET ORAL DAILY
Qty: 90 TABLET | Refills: 0 | Status: SHIPPED | OUTPATIENT
Start: 2023-02-15

## 2023-02-15 RX ORDER — AMLODIPINE BESYLATE 10 MG/1
10 TABLET ORAL DAILY
Qty: 90 TABLET | Refills: 0 | Status: SHIPPED | OUTPATIENT
Start: 2023-02-15

## 2023-04-18 DIAGNOSIS — E11.8 CONTROLLED TYPE 2 DIABETES MELLITUS WITH COMPLICATION, WITHOUT LONG-TERM CURRENT USE OF INSULIN (HCC): ICD-10-CM

## 2023-04-18 NOTE — TELEPHONE ENCOUNTER
Please contact patient. Former pt of Dr. Kathlynn Osler. . Thanks. Last visit 08/17/2022 MD Alec Mcclure   Next appointment Nothing scheduled   Previous refill encounter(s)   08/17/2022 Glucotrol #180 with 1 refill. For Pharmacy Admin Tracking Only    Program: Medication Refill  Intervention Detail: New Rx: 1, reason: Patient Preference  Time Spent (min): 5      Requested Prescriptions     Pending Prescriptions Disp Refills    glipiZIDE (GLUCOTROL) 5 mg tablet 180 Tablet 0     Sig: Take 1 Tablet by mouth two (2) times a day.

## 2023-04-19 DIAGNOSIS — E87.6 HYPOKALEMIA: ICD-10-CM

## 2023-04-21 RX ORDER — POTASSIUM CHLORIDE 1500 MG/1
20 TABLET, FILM COATED, EXTENDED RELEASE ORAL DAILY
Qty: 90 TABLET | Refills: 0 | OUTPATIENT
Start: 2023-04-21

## 2023-04-21 RX ORDER — GLIPIZIDE 5 MG/1
5 TABLET ORAL 2 TIMES DAILY
Qty: 180 TABLET | Refills: 0 | OUTPATIENT
Start: 2023-04-21

## 2023-04-21 NOTE — TELEPHONE ENCOUNTER
Please advise patient refill request denied.  He must establish care with new PCP for continuation of care

## 2023-05-22 RX ORDER — HYDROCHLOROTHIAZIDE 12.5 MG/1
TABLET ORAL
Qty: 90 TABLET | OUTPATIENT
Start: 2023-05-22

## 2023-05-22 RX ORDER — AMLODIPINE BESYLATE 10 MG/1
TABLET ORAL
Qty: 90 TABLET | OUTPATIENT
Start: 2023-05-22

## 2023-06-25 RX ORDER — LOSARTAN POTASSIUM 100 MG/1
TABLET ORAL
Qty: 90 TABLET | OUTPATIENT
Start: 2023-06-25

## 2023-06-25 NOTE — TELEPHONE ENCOUNTER
Future Appointments:  Future Appointments   Date Time Provider [address]   11/8/2023  1:40 PM Lynn Sewell MD [facility] BS AMB        Last Appointment With Me:  Visit date not found     Requested Prescriptions     Pending Prescriptions Disp Refills    losartan (COZAAR) 100 MG tablet [Pharmacy Med Name: LOSARTAN POTASSIUM 100 MG TAB] 90 tablet      Sig: TAKE ONE TABLET BY MOUTH DAILY

## 2023-10-23 NOTE — LETTER
12/15/2017 3:21 PM 
 
Mr. Alana Tom An Carlos [address] [address] To Whom It May Concern: This letter is concerning jury duty for Alana Tom, : 1955, followed at  [facility]. Due to being a diabetic, this patient must be excused from jury duty at this time. Please contact the office if you need further information or have any questions.  
 
 
 
Sincerely, 
 
 
Bette Paige MD 
 Tazorac Counseling:  Patient advised that medication is irritating and drying.  Patient may need to apply sparingly and wash off after an hour before eventually leaving it on overnight.  The patient verbalized understanding of the proper use and possible adverse effects of tazorac.  All of the patient's questions and concerns were addressed.

## 2024-02-05 ENCOUNTER — CARE COORDINATION (OUTPATIENT)
Dept: OTHER | Facility: CLINIC | Age: 69
End: 2024-02-05

## 2024-02-05 NOTE — CARE COORDINATION
Ambulatory Care Coordination Note    ACM attempted to reach patient for introduction to Associate Care Management related to review for care mtg needs/support .     Plan for follow-up call in 3-5 days      No future appointments.     Sarah POLLACK, RN- [facility]  Associate Care Manager  [phone]  [email or web address]

## 2024-02-08 ENCOUNTER — CARE COORDINATION (OUTPATIENT)
Dept: OTHER | Facility: CLINIC | Age: 69
End: 2024-02-08

## 2024-02-08 NOTE — CARE COORDINATION
Ambulatory Care Coordination Note    ACM attempted 2nd outreach to patient for introduction to Associate Care Management related to review and assess for care management needs . Voice mail not set up unable to leave a message     Will continue to outreach.      No future appointments.     Sarah POLLACK, RN- [facility]  Associate Care Manager  [phone]  [email or web address]

## 2024-02-13 ENCOUNTER — CARE COORDINATION (OUTPATIENT)
Dept: OTHER | Facility: CLINIC | Age: 69
End: 2024-02-13

## 2024-02-13 NOTE — CARE COORDINATION
Ambulatory Care Coordination Note    ACM attempted third and final call to patient for introduction to Associate Care Management. Voice mail not set up unable to leave a message       No further outreach scheduled with this ACM, patient has been provided with this ACM's contact information.  ACM will sign off care team at this time.     No future appointments.     Sarah POLLACK, RN- [facility]  Associate Care Manager  [phone]  [email or web address]

## 2024-05-28 ENCOUNTER — TELEPHONE (OUTPATIENT)
Age: 69
End: 2024-05-28

## 2024-05-28 NOTE — TELEPHONE ENCOUNTER
Patient is on the wait list for a sooner available consult/follow up. LM to offer tomorrow at [facility] if still available w/ Opal Vidal NP..

## 2024-06-05 ENCOUNTER — CLINICAL DOCUMENTATION (OUTPATIENT)
Age: 69
End: 2024-06-05

## 2024-06-05 ENCOUNTER — OFFICE VISIT (OUTPATIENT)
Age: 69
End: 2024-06-05
Payer: MEDICARE

## 2024-06-05 VITALS
SYSTOLIC BLOOD PRESSURE: 151 MMHG | BODY MASS INDEX: 37.4 KG/M2 | HEART RATE: 74 BPM | WEIGHT: 232.7 LBS | TEMPERATURE: 98 F | OXYGEN SATURATION: 100 % | HEIGHT: 66 IN | DIASTOLIC BLOOD PRESSURE: 87 MMHG

## 2024-06-05 DIAGNOSIS — G47.33 OBSTRUCTIVE SLEEP APNEA (ADULT) (PEDIATRIC): Primary | ICD-10-CM

## 2024-06-05 DIAGNOSIS — I10 PRIMARY HYPERTENSION: ICD-10-CM

## 2024-06-05 PROCEDURE — G8427 DOCREV CUR MEDS BY ELIG CLIN: HCPCS | Performed by: NURSE PRACTITIONER

## 2024-06-05 PROCEDURE — 1123F ACP DISCUSS/DSCN MKR DOCD: CPT | Performed by: NURSE PRACTITIONER

## 2024-06-05 PROCEDURE — G8417 CALC BMI ABV UP PARAM F/U: HCPCS | Performed by: NURSE PRACTITIONER

## 2024-06-05 PROCEDURE — 3077F SYST BP >= 140 MM HG: CPT | Performed by: NURSE PRACTITIONER

## 2024-06-05 PROCEDURE — 4004F PT TOBACCO SCREEN RCVD TLK: CPT | Performed by: NURSE PRACTITIONER

## 2024-06-05 PROCEDURE — 3017F COLORECTAL CA SCREEN DOC REV: CPT | Performed by: NURSE PRACTITIONER

## 2024-06-05 PROCEDURE — 3079F DIAST BP 80-89 MM HG: CPT | Performed by: NURSE PRACTITIONER

## 2024-06-05 PROCEDURE — 99213 OFFICE O/P EST LOW 20 MIN: CPT | Performed by: NURSE PRACTITIONER

## 2024-06-05 RX ORDER — VALSARTAN 160 MG/1
1 TABLET ORAL
COMMUNITY
Start: 2024-05-06 | End: 2024-06-05

## 2024-06-05 ASSESSMENT — SLEEP AND FATIGUE QUESTIONNAIRES
HOW LIKELY ARE YOU TO NOD OFF OR FALL ASLEEP WHEN YOU ARE A PASSENGER IN A CAR FOR AN HOUR WITHOUT A BREAK: WOULD NEVER DOZE
HOW LIKELY ARE YOU TO NOD OFF OR FALL ASLEEP WHILE SITTING AND TALKING TO SOMEONE: HIGH CHANCE OF DOZING
HOW LIKELY ARE YOU TO NOD OFF OR FALL ASLEEP WHILE LYING DOWN TO REST IN THE AFTERNOON WHEN CIRCUMSTANCES PERMIT: MODERATE CHANCE OF DOZING
HOW LIKELY ARE YOU TO NOD OFF OR FALL ASLEEP WHILE SITTING QUIETLY AFTER LUNCH WITHOUT ALCOHOL: MODERATE CHANCE OF DOZING
HOW LIKELY ARE YOU TO NOD OFF OR FALL ASLEEP WHILE SITTING INACTIVE IN A PUBLIC PLACE: WOULD NEVER DOZE
HOW LIKELY ARE YOU TO NOD OFF OR FALL ASLEEP WHILE SITTING AND READING: MODERATE CHANCE OF DOZING
HOW LIKELY ARE YOU TO NOD OFF OR FALL ASLEEP IN A CAR, WHILE STOPPED FOR A FEW MINUTES IN TRAFFIC: WOULD NEVER DOZE
ESS TOTAL SCORE: 11
HOW LIKELY ARE YOU TO NOD OFF OR FALL ASLEEP WHILE WATCHING TV: MODERATE CHANCE OF DOZING

## 2024-06-05 NOTE — PROGRESS NOTES
PAP supply order faxed to [facility]. Patient confirmed DME and informed that order will be sent while in office.

## 2024-06-05 NOTE — PATIENT INSTRUCTIONS
nasal spray.  If these things do not help, you might try a different type of machine. Some machines have air pressure that adjusts on its own. Others have air pressures that are different when you breathe in than when you breathe out. This may reduce discomfort caused by too much pressure in your nose.               Where can you learn more?   Go to http://[email or web address].net/Tyronecours  Enter X266 in the search box to learn more about \"Learning About CPAP for Sleep Apnea.\"   © 6365-3987 [employer]. Care instructions adapted under license by [employer] (which disclaims liability or warranty for this information). This care instruction is for use with your licensed healthcare professional. If you have questions about a medical condition or this instruction, always ask your healthcare professional. [employer] disclaims any warranty or liability for your use of this information.  Content Version: 8.9.66004; Last Revised: January 11, 2010  PROPER SLEEP HYGIENE    What to avoid  Do not have drinks with caffeine, such as coffee or black tea, for 8 hours before bed.  Do not smoke or use other types of tobacco near bedtime. Nicotine is a stimulant and can keep you awake.  Avoid drinking alcohol late in the evening, because it can cause you to wake in the middle of the night.  Do not eat a big meal close to bedtime. If you are hungry, eat a light snack.  Do not drink a lot of water close to bedtime, because the need to urinate may wake you up during the night.  Do not read or watch TV in bed. Use the bed only for sleeping and sexual activity.  What to try  Go to bed at the same time every night, and wake up at the same time every morning. Do not take naps during the day.  Keep your bedroom quiet, dark, and cool.  Get regular exercise, but not within 3 to 4 hours of your bedtime..  Sleep on a comfortable pillow and mattress.  If watching the clock makes you anxious, turn it facing away from you so

## 2024-06-05 NOTE — PROGRESS NOTES
PAP and using nightly, he does need new supplies.    Review of device download indicated:  BiPAP pressure: IPAP 22, EPAP 16 cmH2O;   95th Percentile Leak: 50.0 L/Min     CMS Compliance % Used Days >= 4 hours: 100.    Average daily use of 6:50 hours.     Therapy Apnea Index averaged over PAP use: 3.9 /hr which reflects improved sleep breathing condition.    [place] Sleepiness Score: 11 and Modified F.O.S.Q. Score Total / 2: 18  which reflects improved sleep quality over therapy time.    CO2 24 mmol/L (range 21-32) on labs 8/17/2022    Sleep Review of Systems: notable for Negative difficulty falling asleep; Negative awakenings at night; Negative early morning headaches; Negative memory problems; Negative concentration issues; Negative chest pain; Negative shortness of breath; Negative significant joint pain at night; Negative significant muscle pain at night; Negative rashes or itching; Negative heartburn; Negative significant mood issues; 0-1 afternoon naps per week      BP (!) 151/87 (Site: Left Upper Arm, Position: Sitting, Cuff Size: Large Adult)   Pulse 74   Temp 98 °F (36.7 °C) (Temporal)   Ht 1.676 m (5' 6\")   Wt 105.6 kg (232 lb 11.2 oz)   SpO2 100%   BMI 37.56 kg/m²        General:   Alert, oriented, not in acute distress   Eyes:  Anicteric Sclerae; no obvious strabismus   Nose:  No obvious nasal septum deviation    Neck:   Midline trachea   Chest/Lungs:  Symmetrical lung expansion, clear lung fields on auscultation    CVS:  Normal rate, regular rhythm,  no JVD   Extremities:  No obvious rashes, no edema    Neuro:  No focal deficits; No obvious tremor    Psych:  Normal affect,  normal countenance     Patient's phone number [phone] was reviewed and confirmed for accuracy.  He gives permission for messages regarding results and appointments to be left at that number.    An electronic signature was used to authenticate this note.    -- SHRADDHA Samuel NP, [facility]  06/05/24

## 2025-06-16 ENCOUNTER — TRANSCRIBE ORDERS (OUTPATIENT)
Facility: HOSPITAL | Age: 70
End: 2025-06-16

## 2025-06-16 DIAGNOSIS — N18.32 STAGE 3B CHRONIC KIDNEY DISEASE (CKD) (HCC): Primary | ICD-10-CM

## (undated) DEVICE — SNARE ENDOSCP M L240CM W27MM SHTH DIA2.4MM CHN 2.8MM OVL

## (undated) DEVICE — TRAP SPEC COLL POLYP POLYSTYR --

## (undated) DEVICE — KENDALL RADIOLUCENT FOAM MONITORING ELECTRODE RECTANGULAR SHAPE: Brand: KENDALL

## (undated) DEVICE — SYR 10ML LUER LOK 1/5ML GRAD --

## (undated) DEVICE — Device

## (undated) DEVICE — CONTAINER SPEC 20 ML LID NEUT BUFF FORMALIN 10 % POLYPR STS

## (undated) DEVICE — NEEDLE HYPO 18GA L1.5IN PNK S STL HUB POLYPR SHLD REG BVL

## (undated) DEVICE — SYRINGE 50ML E/T

## (undated) DEVICE — NEONATAL-ADULT SPO2 SENSOR: Brand: NELLCOR

## (undated) DEVICE — CATH IV AUTOGRD BC PNK 20GA 25 -- INSYTE

## (undated) DEVICE — SYR 3ML LL TIP 1/10ML GRAD --

## (undated) DEVICE — BASIN EMSIS 16OZ GRAPHITE PLAS KID SHP MOLD GRAD FOR ORAL

## (undated) DEVICE — SET ADMIN 16ML TBNG L100IN 2 Y INJ SITE IV PIGGY BK DISP

## (undated) DEVICE — 1200 GUARD II KIT W/5MM TUBE W/O VAC TUBE: Brand: GUARDIAN

## (undated) DEVICE — Z DISCONTINUED PER MEDLINE LINE GAS SAMPLING O2/CO2 LNG AD 13 FT NSL W/ TBNG FILTERLINE

## (undated) DEVICE — SOLIDIFIER MEDC 1200ML -- CONVERT TO 356117

## (undated) DEVICE — TOWEL 4 PLY TISS 19X30 SUE WHT